# Patient Record
Sex: FEMALE | Employment: OTHER | URBAN - METROPOLITAN AREA
[De-identification: names, ages, dates, MRNs, and addresses within clinical notes are randomized per-mention and may not be internally consistent; named-entity substitution may affect disease eponyms.]

---

## 2022-03-18 PROBLEM — G25.81 RLS (RESTLESS LEGS SYNDROME): Status: ACTIVE | Noted: 2018-02-15

## 2022-03-18 PROBLEM — F41.9 ANXIETY: Status: ACTIVE | Noted: 2022-03-10

## 2022-03-19 PROBLEM — F51.04 PSYCHOPHYSIOLOGICAL INSOMNIA: Status: ACTIVE | Noted: 2022-03-10

## 2022-03-19 PROBLEM — G25.0 ESSENTIAL TREMOR: Status: ACTIVE | Noted: 2022-03-10

## 2022-03-19 PROBLEM — E66.01 MORBID OBESITY (HCC): Status: ACTIVE | Noted: 2022-03-10

## 2022-03-19 PROBLEM — E78.00 ELEVATED LDL CHOLESTEROL LEVEL: Status: ACTIVE | Noted: 2022-03-10

## 2022-03-19 PROBLEM — Z96.652 STATUS POST LEFT KNEE REPLACEMENT: Status: ACTIVE | Noted: 2022-03-10

## 2022-03-20 PROBLEM — F33.42 RECURRENT MAJOR DEPRESSIVE DISORDER, IN FULL REMISSION (HCC): Status: ACTIVE | Noted: 2022-03-10

## 2022-05-19 DIAGNOSIS — F41.9 ANXIETY: ICD-10-CM

## 2022-05-19 DIAGNOSIS — G47.00 INSOMNIA, UNSPECIFIED TYPE: ICD-10-CM

## 2022-05-19 DIAGNOSIS — F32.A DEPRESSION, UNSPECIFIED DEPRESSION TYPE: Primary | ICD-10-CM

## 2022-06-01 ENCOUNTER — TELEPHONE (OUTPATIENT)
Dept: NEUROLOGY | Age: 67
End: 2022-06-01

## 2022-06-05 DIAGNOSIS — F51.04 PSYCHOPHYSIOLOGIC INSOMNIA: ICD-10-CM

## 2022-06-06 RX ORDER — TRAZODONE HYDROCHLORIDE 50 MG/1
TABLET ORAL
Qty: 30 TABLET | Refills: 0 | Status: SHIPPED | OUTPATIENT
Start: 2022-06-06 | End: 2022-06-28 | Stop reason: SDUPTHER

## 2022-06-06 NOTE — TELEPHONE ENCOUNTER
She was referred to [facility] psychiatry at last appointment. She needs to establish with psychiatry.   Please contact the office to expedite this referral

## 2022-06-08 ENCOUNTER — OFFICE VISIT (OUTPATIENT)
Dept: FAMILY MEDICINE CLINIC | Facility: CLINIC | Age: 67
End: 2022-06-08
Payer: COMMERCIAL

## 2022-06-08 VITALS
SYSTOLIC BLOOD PRESSURE: 130 MMHG | BODY MASS INDEX: 40.18 KG/M2 | OXYGEN SATURATION: 98 % | TEMPERATURE: 99 F | WEIGHT: 250 LBS | DIASTOLIC BLOOD PRESSURE: 78 MMHG | HEIGHT: 66 IN | RESPIRATION RATE: 16 BRPM | HEART RATE: 82 BPM

## 2022-06-08 DIAGNOSIS — J01.90 ACUTE RHINOSINUSITIS: Primary | ICD-10-CM

## 2022-06-08 DIAGNOSIS — R50.9 FEVER, UNSPECIFIED FEVER CAUSE: ICD-10-CM

## 2022-06-08 DIAGNOSIS — H10.33 ACUTE CONJUNCTIVITIS OF BOTH EYES, UNSPECIFIED ACUTE CONJUNCTIVITIS TYPE: ICD-10-CM

## 2022-06-08 DIAGNOSIS — R05.9 COUGH: ICD-10-CM

## 2022-06-08 LAB — SARS-COV-2, POC: NORMAL

## 2022-06-08 PROCEDURE — G8427 DOCREV CUR MEDS BY ELIG CLIN: HCPCS | Performed by: FAMILY MEDICINE

## 2022-06-08 PROCEDURE — 3017F COLORECTAL CA SCREEN DOC REV: CPT | Performed by: FAMILY MEDICINE

## 2022-06-08 PROCEDURE — 99213 OFFICE O/P EST LOW 20 MIN: CPT | Performed by: FAMILY MEDICINE

## 2022-06-08 PROCEDURE — G8400 PT W/DXA NO RESULTS DOC: HCPCS | Performed by: FAMILY MEDICINE

## 2022-06-08 PROCEDURE — 1090F PRES/ABSN URINE INCON ASSESS: CPT | Performed by: FAMILY MEDICINE

## 2022-06-08 PROCEDURE — 1036F TOBACCO NON-USER: CPT | Performed by: FAMILY MEDICINE

## 2022-06-08 PROCEDURE — 87426 SARSCOV CORONAVIRUS AG IA: CPT | Performed by: FAMILY MEDICINE

## 2022-06-08 PROCEDURE — 1123F ACP DISCUSS/DSCN MKR DOCD: CPT | Performed by: FAMILY MEDICINE

## 2022-06-08 PROCEDURE — G8417 CALC BMI ABV UP PARAM F/U: HCPCS | Performed by: FAMILY MEDICINE

## 2022-06-08 RX ORDER — POLYMYXIN B SULFATE AND TRIMETHOPRIM 1; 10000 MG/ML; [USP'U]/ML
1 SOLUTION OPHTHALMIC 4 TIMES DAILY
Qty: 20 ML | Refills: 0 | Status: SHIPPED | OUTPATIENT
Start: 2022-06-08 | End: 2022-06-15

## 2022-06-08 RX ORDER — AZITHROMYCIN 500 MG/1
500 TABLET, FILM COATED ORAL DAILY
Qty: 3 TABLET | Refills: 0 | Status: SHIPPED | OUTPATIENT
Start: 2022-06-08 | End: 2022-06-11

## 2022-06-08 ASSESSMENT — ENCOUNTER SYMPTOMS
VOMITING: 0
NAUSEA: 0
ABDOMINAL PAIN: 0
SINUS PAIN: 0
SHORTNESS OF BREATH: 0
EYE PAIN: 1
SORE THROAT: 0
RHINORRHEA: 0
SINUS PRESSURE: 1
DIARRHEA: 0
COUGH: 0
CHEST TIGHTNESS: 0
EYE REDNESS: 1
WHEEZING: 0
EYE DISCHARGE: 1
EYE ITCHING: 1

## 2022-06-08 ASSESSMENT — PATIENT HEALTH QUESTIONNAIRE - PHQ9
1. LITTLE INTEREST OR PLEASURE IN DOING THINGS: 0
2. FEELING DOWN, DEPRESSED OR HOPELESS: 0
SUM OF ALL RESPONSES TO PHQ QUESTIONS 1-9: 0
SUM OF ALL RESPONSES TO PHQ9 QUESTIONS 1 & 2: 0
SUM OF ALL RESPONSES TO PHQ QUESTIONS 1-9: 0

## 2022-06-08 NOTE — PATIENT INSTRUCTIONS
Patient Education        Pinkeye: Care Instructions  Overview     Pinkeye is redness and swelling of the eye surface and the conjunctiva (the lining of the eyelid and the covering of the white part of the eye). Pinkeye is also called conjunctivitis. Pinkeye is often caused by infection with bacteriaor a virus. Dry air, allergies, smoke, and chemicals are other common causes. Pinkeye often gets better on its own in 7 to 10 days. Antibiotics only help if the pinkeye is caused by bacteria. Pinkeye caused by infection spreads easily. If an allergy or chemical is causing pinkeye, it will not go away unless youcan avoid whatever is causing it. Follow-up care is a key part of your treatment and safety. Be sure to make and go to all appointments, and call your doctor if you are having problems. It's also a good idea to know your test results and keep alist of the medicines you take. How can you care for yourself at home?  Wash your hands often. Always wash them before and after you treat pinkeye or touch your eyes or face.  Use moist cotton or a clean, wet cloth to remove crust. Wipe from the inside corner of the eye to the outside. Use a clean part of the cloth for each wipe.  Put cold or warm wet cloths on your eye a few times a day if the eye hurts.  Do not wear contact lenses or eye makeup until the pinkeye is gone. Throw away any eye makeup you were using when you got pinkeye. Clean your contacts and storage case. If you wear disposable contacts, use a new pair when your eye has cleared and it is safe to wear contacts again.  If the doctor gave you antibiotic ointment or eyedrops, use them as directed. Use the medicine for as long as instructed, even if your eye starts looking better soon. Keep the bottle tip clean, and do not let it touch the eye area.  To put in eyedrops or ointment:  ? Tilt your head back, and pull your lower eyelid down with one finger. ?  Drop or squirt the medicine inside the lower lid.  ? Close your eye for 30 to 60 seconds to let the drops or ointment move around. ? Do not touch the ointment or dropper tip to your eyelashes or any other surface.  Do not share towels, pillows, or washcloths while you have pinkeye. When should you call for help? Call your doctor now or seek immediate medical care if:     You have pain in your eye, not just irritation on the surface.      You have a change in vision or loss of vision.      You have an increase in discharge from the eye.      Your eye has not started to improve or begins to get worse within 48 hours after you start using antibiotics.      Pinkeye lasts longer than 7 days. Watch closely for changes in your health, and be sure to contact your doctor ifyou have any problems. Where can you learn more? Go to https://[email or web address].[email or web address].NoPaperForms.com. org and sign in to your [employer] account. Enter Y392 in the miiCard box to learn more about \"Pinkeye: Care Instructions. \"     If you do not have an account, please click on the \"Sign Up Now\" link. Current as of: July 1, 2021               Content Version: 13.2  © 9513-5981 [employer]wise, Incorporated. Care instructions adapted under license by [facility] ([facility]). If you have questions about a medical condition or this instruction, always ask your healthcare professional. [address] any warranty or liability for your use of this information.

## 2022-06-08 NOTE — PROGRESS NOTES
[address] [facility],[address], DO  [address], [address] [facility] [address]   No:  [phone]  Fax:  [phone]        Assessment/Plan:   Soila Garcia was seen today for sinus problem. Diagnoses and all orders for this visit:    Acute rhinosinusitis  With symptoms improving advised patient hold off on starting azithromycin for a few days. But if symptoms worsen or do not improve by day 7-10 and she is advised to start azithromycin. She has penicillin allergy. -     azithromycin (ZITHROMAX) 500 MG tablet; Take 1 tablet by mouth daily for 3 days    Acute conjunctivitis of both eyes, unspecified acute conjunctivitis type  Prescribing Polytrim eyedrops. -     trimethoprim-polymyxin b (POLYTRIM) 41471-1.1 UNIT/ML-% ophthalmic solution; Place 1 drop into both eyes 4 times daily for 7 days    Cough  COVID-19 negative. Advised patient of results  -     AMB POC SARS-COV-2    Fever, unspecified fever cause  -     AMB POC SARS-COV-2        Labs:  Results for orders placed or performed in visit on 06/08/22   AMB POC SARS-COV-2   Result Value Ref Range    SARS-COV-2, POC Not-Detected Not Detected             Herminia Yan is a 77 y.o. female who is seen for evaluation of   Chief Complaint   Patient presents with    Sinus Problem     siuses are full, feels heavy, gives her a headache. ears are blocked. Sputum is brownish in color, postnasal drip, cough. Also thinks she may have pink eye as well. States that her symptoms started last Thursday. HPI:   Here today for acute URI symptoms that started 5 days ago. Some better. Started off with sore throat this or has resolved. Had ear pain or feeling of ears being plugged but this is better. No nausea, vomiting or diarrhea. She is had a wet cough. She is having brown nasal discharge and congestion. She has had decrease in smell and taste. She has had night sweats but no fever no chills.   She initially was short of breath but this has resolved. She has had redness in her eyes right and now left today. This is been going on for few days in her right eye. She states she has been having white mucousy discharge from her eyes with itch and discomfort in her right eye. She has heard from neurology office in regards to tremor. She has not heard from psychiatry office yet. Review of Systems:  Review of Systems   Constitutional: Positive for diaphoresis. Negative for chills and fatigue. HENT: Positive for congestion and sinus pressure. Negative for rhinorrhea, sinus pain and sore throat. Eyes: Positive for pain, discharge, redness and itching. Respiratory: Negative for cough, chest tightness, shortness of breath and wheezing. Cardiovascular: Negative for chest pain. Gastrointestinal: Negative for abdominal pain, diarrhea, nausea and vomiting. Musculoskeletal: Negative for myalgias. Neurological: Negative for headaches. History:  Past Medical History:   Diagnosis Date    Anxiety     Depression     Elevated LDL cholesterol level     Essential tremor     Melanoma in situ (HCC)     Osteoporosis     Prediabetes     RLS (restless legs syndrome)        Past Surgical History:   Procedure Laterality Date     SECTION      GASTRIC BYPASS SURGERY      MALIGNANT SKIN LESION EXCISION      TONSILLECTOMY      TOTAL KNEE ARTHROPLASTY Left        Current Outpatient Medications   Medication Sig Dispense Refill    trimethoprim-polymyxin b (POLYTRIM) 17305-4.1 UNIT/ML-% ophthalmic solution Place 1 drop into both eyes 4 times daily for 7 days 20 mL 0    azithromycin (ZITHROMAX) 500 MG tablet Take 1 tablet by mouth daily for 3 days 3 tablet 0    traZODone (DESYREL) 50 MG tablet TAKE 1 TABLET BY MOUTH NIGHTLY.  INDICATIONS: INSOMNIA ASSOCIATED WITH DEPRESSION 30 tablet 0    MAGNESIUM PO Take by mouth      MELATONIN PO Take by mouth      ZINC PO Take by mouth      escitalopram (LEXAPRO) 20 MG tablet Take 20 mg by mouth Cervical: No cervical adenopathy. Skin:     General: Skin is warm. Findings: No rash. Neurological:      Mental Status: She is alert. Psychiatric:         Mood and Affect: Mood normal.         Behavior: Behavior normal.             Leilani Parada DO    This note was generated using Dragon voice recognition software.   There may be medical errors due to computer generated translation

## 2022-06-28 ENCOUNTER — OFFICE VISIT (OUTPATIENT)
Dept: FAMILY MEDICINE CLINIC | Facility: CLINIC | Age: 67
End: 2022-06-28
Payer: MEDICARE

## 2022-06-28 VITALS
OXYGEN SATURATION: 97 % | RESPIRATION RATE: 18 BRPM | HEIGHT: 66 IN | WEIGHT: 246.8 LBS | SYSTOLIC BLOOD PRESSURE: 130 MMHG | HEART RATE: 80 BPM | BODY MASS INDEX: 39.66 KG/M2 | DIASTOLIC BLOOD PRESSURE: 72 MMHG

## 2022-06-28 DIAGNOSIS — F51.04 PSYCHOPHYSIOLOGIC INSOMNIA: ICD-10-CM

## 2022-06-28 DIAGNOSIS — F41.9 ANXIETY: ICD-10-CM

## 2022-06-28 DIAGNOSIS — E78.00 ELEVATED LDL CHOLESTEROL LEVEL: ICD-10-CM

## 2022-06-28 DIAGNOSIS — R73.03 PREDIABETES: Primary | ICD-10-CM

## 2022-06-28 DIAGNOSIS — F33.1 MODERATE EPISODE OF RECURRENT MAJOR DEPRESSIVE DISORDER (HCC): ICD-10-CM

## 2022-06-28 DIAGNOSIS — Z00.00 INITIAL MEDICARE ANNUAL WELLNESS VISIT: ICD-10-CM

## 2022-06-28 DIAGNOSIS — R42 POSTURAL DIZZINESS: ICD-10-CM

## 2022-06-28 DIAGNOSIS — F51.04 PSYCHOPHYSIOLOGICAL INSOMNIA: ICD-10-CM

## 2022-06-28 DIAGNOSIS — J45.909 UNCOMPLICATED ASTHMA, UNSPECIFIED ASTHMA SEVERITY, UNSPECIFIED WHETHER PERSISTENT: ICD-10-CM

## 2022-06-28 DIAGNOSIS — E66.01 MORBID OBESITY (HCC): ICD-10-CM

## 2022-06-28 DIAGNOSIS — G25.81 RLS (RESTLESS LEGS SYNDROME): ICD-10-CM

## 2022-06-28 DIAGNOSIS — Z91.81 AT HIGH RISK FOR FALLS: ICD-10-CM

## 2022-06-28 DIAGNOSIS — Z11.59 NEED FOR HEPATITIS C SCREENING TEST: ICD-10-CM

## 2022-06-28 LAB
ALBUMIN SERPL-MCNC: 3.5 G/DL (ref 3.2–4.6)
ALBUMIN/GLOB SERPL: 1.3 {RATIO} (ref 1.2–3.5)
ALP SERPL-CCNC: 94 U/L (ref 50–136)
ALT SERPL-CCNC: 20 U/L (ref 12–65)
ANION GAP SERPL CALC-SCNC: 5 MMOL/L (ref 7–16)
AST SERPL-CCNC: 9 U/L (ref 15–37)
BASOPHILS # BLD: 0 K/UL (ref 0–0.2)
BASOPHILS NFR BLD: 1 % (ref 0–2)
BILIRUB SERPL-MCNC: 0.3 MG/DL (ref 0.2–1.1)
BUN SERPL-MCNC: 20 MG/DL (ref 8–23)
CALCIUM SERPL-MCNC: 9.3 MG/DL (ref 8.3–10.4)
CHLORIDE SERPL-SCNC: 109 MMOL/L (ref 98–107)
CHOLEST SERPL-MCNC: 224 MG/DL
CO2 SERPL-SCNC: 28 MMOL/L (ref 21–32)
CREAT SERPL-MCNC: 0.8 MG/DL (ref 0.6–1)
DIFFERENTIAL METHOD BLD: ABNORMAL
EOSINOPHIL # BLD: 0.2 K/UL (ref 0–0.8)
EOSINOPHIL NFR BLD: 4 % (ref 0.5–7.8)
ERYTHROCYTE [DISTWIDTH] IN BLOOD BY AUTOMATED COUNT: 15 % (ref 11.9–14.6)
GLOBULIN SER CALC-MCNC: 2.8 G/DL (ref 2.3–3.5)
GLUCOSE SERPL-MCNC: 106 MG/DL (ref 65–100)
HCT VFR BLD AUTO: 44.4 % (ref 35.8–46.3)
HDLC SERPL-MCNC: 68 MG/DL (ref 40–60)
HDLC SERPL: 3.3 {RATIO}
HGB BLD-MCNC: 13.5 G/DL (ref 11.7–15.4)
IMM GRANULOCYTES # BLD AUTO: 0 K/UL (ref 0–0.5)
IMM GRANULOCYTES NFR BLD AUTO: 1 % (ref 0–5)
LDLC SERPL CALC-MCNC: 138 MG/DL
LYMPHOCYTES # BLD: 1.3 K/UL (ref 0.5–4.6)
LYMPHOCYTES NFR BLD: 30 % (ref 13–44)
MCH RBC QN AUTO: 30.3 PG (ref 26.1–32.9)
MCHC RBC AUTO-ENTMCNC: 30.4 G/DL (ref 31.4–35)
MCV RBC AUTO: 99.8 FL (ref 79.6–97.8)
MONOCYTES # BLD: 0.4 K/UL (ref 0.1–1.3)
MONOCYTES NFR BLD: 9 % (ref 4–12)
NEUTS SEG # BLD: 2.5 K/UL (ref 1.7–8.2)
NEUTS SEG NFR BLD: 56 % (ref 43–78)
NRBC # BLD: 0 K/UL (ref 0–0.2)
PLATELET # BLD AUTO: 299 K/UL (ref 150–450)
PMV BLD AUTO: 10.5 FL (ref 9.4–12.3)
POTASSIUM SERPL-SCNC: 4.5 MMOL/L (ref 3.5–5.1)
PROT SERPL-MCNC: 6.3 G/DL (ref 6.3–8.2)
RBC # BLD AUTO: 4.45 M/UL (ref 4.05–5.2)
SODIUM SERPL-SCNC: 142 MMOL/L (ref 136–145)
TRIGL SERPL-MCNC: 90 MG/DL (ref 35–150)
VLDLC SERPL CALC-MCNC: 18 MG/DL (ref 6–23)
WBC # BLD AUTO: 4.4 K/UL (ref 4.3–11.1)

## 2022-06-28 PROCEDURE — 1123F ACP DISCUSS/DSCN MKR DOCD: CPT | Performed by: FAMILY MEDICINE

## 2022-06-28 PROCEDURE — G0402 INITIAL PREVENTIVE EXAM: HCPCS | Performed by: FAMILY MEDICINE

## 2022-06-28 PROCEDURE — G8417 CALC BMI ABV UP PARAM F/U: HCPCS | Performed by: FAMILY MEDICINE

## 2022-06-28 PROCEDURE — G8400 PT W/DXA NO RESULTS DOC: HCPCS | Performed by: FAMILY MEDICINE

## 2022-06-28 PROCEDURE — 1036F TOBACCO NON-USER: CPT | Performed by: FAMILY MEDICINE

## 2022-06-28 PROCEDURE — 99214 OFFICE O/P EST MOD 30 MIN: CPT | Performed by: FAMILY MEDICINE

## 2022-06-28 PROCEDURE — 1090F PRES/ABSN URINE INCON ASSESS: CPT | Performed by: FAMILY MEDICINE

## 2022-06-28 PROCEDURE — 3017F COLORECTAL CA SCREEN DOC REV: CPT | Performed by: FAMILY MEDICINE

## 2022-06-28 PROCEDURE — G8427 DOCREV CUR MEDS BY ELIG CLIN: HCPCS | Performed by: FAMILY MEDICINE

## 2022-06-28 RX ORDER — LAMOTRIGINE 100 MG/1
100 TABLET ORAL DAILY
Qty: 30 TABLET | Refills: 2 | Status: SHIPPED | OUTPATIENT
Start: 2022-06-28

## 2022-06-28 RX ORDER — ESCITALOPRAM OXALATE 20 MG/1
20 TABLET ORAL DAILY
Qty: 30 TABLET | Refills: 2 | Status: SHIPPED | OUTPATIENT
Start: 2022-06-28

## 2022-06-28 RX ORDER — LAMOTRIGINE 25 MG/1
25 TABLET ORAL DAILY
Qty: 30 TABLET | Refills: 2 | Status: SHIPPED | OUTPATIENT
Start: 2022-06-28

## 2022-06-28 RX ORDER — LANOLIN ALCOHOL/MO/W.PET/CERES
3000 CREAM (GRAM) TOPICAL DAILY
COMMUNITY

## 2022-06-28 RX ORDER — TRAZODONE HYDROCHLORIDE 50 MG/1
TABLET ORAL
Qty: 30 TABLET | Refills: 2 | Status: SHIPPED | OUTPATIENT
Start: 2022-06-28

## 2022-06-28 RX ORDER — FLUTICASONE PROPIONATE 100 MCG
BLISTER, WITH INHALATION DEVICE INHALATION
Qty: 60 EACH | Refills: 3 | Status: SHIPPED | OUTPATIENT
Start: 2022-06-28

## 2022-06-28 RX ORDER — ALBUTEROL SULFATE 90 UG/1
2 AEROSOL, METERED RESPIRATORY (INHALATION) 4 TIMES DAILY PRN
Qty: 18 G | Refills: 5 | Status: SHIPPED | OUTPATIENT
Start: 2022-06-28

## 2022-06-28 RX ORDER — CHOLECALCIFEROL (VITAMIN D3) 1250 MCG
CAPSULE ORAL
COMMUNITY

## 2022-06-28 RX ORDER — ESCITALOPRAM OXALATE 20 MG/1
TABLET ORAL
Qty: 90 TABLET | OUTPATIENT
Start: 2022-06-28

## 2022-06-28 RX ORDER — ASCORBIC ACID 500 MG
500 TABLET ORAL DAILY
COMMUNITY

## 2022-06-28 ASSESSMENT — ENCOUNTER SYMPTOMS
SHORTNESS OF BREATH: 0
BLOOD IN STOOL: 0
VOMITING: 0
ABDOMINAL PAIN: 0
COUGH: 0
NAUSEA: 0
BACK PAIN: 1

## 2022-06-28 ASSESSMENT — PATIENT HEALTH QUESTIONNAIRE - PHQ9
5. POOR APPETITE OR OVEREATING: 2
SUM OF ALL RESPONSES TO PHQ9 QUESTIONS 1 & 2: 6
SUM OF ALL RESPONSES TO PHQ QUESTIONS 1-9: 15
9. THOUGHTS THAT YOU WOULD BE BETTER OFF DEAD, OR OF HURTING YOURSELF: 0
10. IF YOU CHECKED OFF ANY PROBLEMS, HOW DIFFICULT HAVE THESE PROBLEMS MADE IT FOR YOU TO DO YOUR WORK, TAKE CARE OF THINGS AT HOME, OR GET ALONG WITH OTHER PEOPLE: 2
SUM OF ALL RESPONSES TO PHQ QUESTIONS 1-9: 15
SUM OF ALL RESPONSES TO PHQ QUESTIONS 1-9: 15
1. LITTLE INTEREST OR PLEASURE IN DOING THINGS: 3
2. FEELING DOWN, DEPRESSED OR HOPELESS: 3
4. FEELING TIRED OR HAVING LITTLE ENERGY: 3
8. MOVING OR SPEAKING SO SLOWLY THAT OTHER PEOPLE COULD HAVE NOTICED. OR THE OPPOSITE, BEING SO FIGETY OR RESTLESS THAT YOU HAVE BEEN MOVING AROUND A LOT MORE THAN USUAL: 0
SUM OF ALL RESPONSES TO PHQ QUESTIONS 1-9: 15
3. TROUBLE FALLING OR STAYING ASLEEP: 3
6. FEELING BAD ABOUT YOURSELF - OR THAT YOU ARE A FAILURE OR HAVE LET YOURSELF OR YOUR FAMILY DOWN: 1
7. TROUBLE CONCENTRATING ON THINGS, SUCH AS READING THE NEWSPAPER OR WATCHING TELEVISION: 0

## 2022-06-28 ASSESSMENT — ANXIETY QUESTIONNAIRES
6. BECOMING EASILY ANNOYED OR IRRITABLE: 0
4. TROUBLE RELAXING: 0
5. BEING SO RESTLESS THAT IT IS HARD TO SIT STILL: 0
7. FEELING AFRAID AS IF SOMETHING AWFUL MIGHT HAPPEN: 0
2. NOT BEING ABLE TO STOP OR CONTROL WORRYING: 0
1. FEELING NERVOUS, ANXIOUS, OR ON EDGE: 0
GAD7 TOTAL SCORE: 0
3. WORRYING TOO MUCH ABOUT DIFFERENT THINGS: 0

## 2022-06-28 NOTE — PROGRESS NOTES
Medicare Annual Wellness Visit    Rosetta To is here for Medicare AWV    Assessment & Plan   Prediabetes  -     CBC with Auto Differential; Future  -     Comprehensive Metabolic Panel; Future  -     Hemoglobin A1C; Future  -     Lipid Panel; Future  Moderate episode of recurrent major depressive disorder ([facility] [address]  -     External Referral to Psychiatry  -     lamoTRIgine (LAMICTAL) 100 MG tablet; Take 1 tablet by mouth daily, Disp-30 tablet, R-2Normal  -     escitalopram (LEXAPRO) 20 MG tablet; Take 1 tablet by mouth daily, Disp-30 tablet, R-2Normal  -     lamoTRIgine (LAMICTAL) 25 MG tablet; Take 1 tablet by mouth daily, Disp-30 tablet, R-2Normal  -     traZODone (DESYREL) 50 MG tablet; TAKE 1 TABLET BY MOUTH NIGHTLY. INDICATIONS: INSOMNIA ASSOCIATED WITH DEPRESSION, Disp-30 tablet, R-2Normal  Anxiety  -     External Referral to Psychiatry  -     lamoTRIgine (LAMICTAL) 100 MG tablet; Take 1 tablet by mouth daily, Disp-30 tablet, R-2Normal  -     escitalopram (LEXAPRO) 20 MG tablet; Take 1 tablet by mouth daily, Disp-30 tablet, R-2Normal  -     lamoTRIgine (LAMICTAL) 25 MG tablet; Take 1 tablet by mouth daily, Disp-30 tablet, R-2Normal  Psychophysiological insomnia  -     External Referral to Psychiatry  -     [facility]  -     traZODone (DESYREL) 50 MG tablet; TAKE 1 TABLET BY MOUTH NIGHTLY. INDICATIONS: INSOMNIA ASSOCIATED WITH DEPRESSION, Disp-30 tablet, R-2Normal  RLS (restless legs syndrome)  Uncomplicated asthma, unspecified asthma severity, unspecified whether persistent  -     albuterol sulfate HFA (VENTOLIN HFA) 108 (90 Base) MCG/ACT inhaler; Inhale 2 puffs into the lungs 4 times daily as needed for Wheezing or Shortness of Breath, Disp-18 g, R-5Normal  -     fluticasone propionate (FLOVENT DISKUS) 100 MCG/BLIST AEPB inhaler; 1 puff twice daily.   Brush teeth after each use, Disp-60 each, R-3Normal  Elevated LDL cholesterol level  -     CBC with Auto Differential; Future  - Comprehensive Metabolic Panel; Future  -     Hemoglobin A1C; Future  -     Lipid Panel; Future  Morbid obesity ([facility] [address]  -     [address] [facility]  At high risk for falls  -     External Referral to Physical Therapy  Postural dizziness  -     [address]  Psychophysiologic insomnia  -     traZODone (DESYREL) 50 MG tablet; TAKE 1 TABLET BY MOUTH NIGHTLY. INDICATIONS: INSOMNIA ASSOCIATED WITH DEPRESSION, Disp-30 tablet, R-2Normal  Initial Medicare annual wellness visit  Need for hepatitis C screening test  -     Hepatitis C Antibody; Future      Recommendations for Preventive Services Due: see orders and patient instructions/AVS.  Recommended screening schedule for the next 5-10 years is provided to the patient in written form: see Patient Instructions/AVS.     Return in 6 months (on 12/28/2022) for PreDM-labs prior . Subjective       Patient's complete Health Risk Assessment and screening values have been reviewed and are found in Flowsheets. The following problems were reviewed today and where indicated follow up appointments were made and/or referrals ordered.     Positive Risk Factor Screenings with Interventions:    Fall Risk:  Do you feel unsteady or are you worried about falling? : (!) yes  2 or more falls in past year?: (!) yes  Fall with injury in past year?: no     Fall Risk Interventions:    · Physical therapy referral ordered for strength and balance training     Depression:  PHQ-2 Score: 6  PHQ-9 Total Score: 15    Severity:1-4 = minimal depression, 5-9 = mild depression, 10-14 = moderate depression, 15-19 = moderately severe depression, 20-27 = severe depression    Depression Interventions:  · Psychiatry referral ordered for further evaluation/treatment            General Health and ACP:  General  In general, how would you say your health is?: Good  In the past 7 days, have you experienced any of the following: New or Increased Pain, New or Increased Fatigue, Loneliness, Social Isolation, Stress or Anger?: No  Do you get the social and emotional support that you need?: Yes  Do you have a Living Will?: Yes    Advance Directives     Power of Azeem Gonzalez Will ACP-Advance Directive ACP-Power of     Not on File Not on File Not on File Not on File      General Health Risk Interventions:  · No Living Will: Advance Care Planning addressed with patient today    Health Habits/Nutrition:     Physical Activity: Insufficiently Active    Days of Exercise per Week: 1 day    Minutes of Exercise per Session: 30 min     Have you lost any weight without trying in the past 3 months?: No  Body mass index: (!) 39.83  Have you seen the dentist within the past year?: Yes    Health Habits/Nutrition Interventions:  · Inadequate physical activity:  Referral to PT placed             Objective   Vitals:    06/28/22 0954   BP: 130/72   Site: Left Lower Arm   Position: Sitting   Pulse: 80   Resp: 18   SpO2: 97%   Weight: 246 lb 12.8 oz (111.9 kg)   Height: 5' 6\" (1.676 m)      Body mass index is 39.83 kg/m². Allergies   Allergen Reactions    Bupropion Other (See Comments)     headache    Penicillins Rash     Prior to Visit Medications    Medication Sig Taking?  Authorizing Provider   vitamin B-12 (CYANOCOBALAMIN) 1000 MCG tablet Take 3,000 mcg by mouth daily  Yes Historical Provider, MD   Cholecalciferol (VITAMIN D3) 1.25 MG (05199 UT) CAPS Take by mouth Yes Historical Provider, MD   vitamin C (ASCORBIC ACID) 500 MG tablet Take 500 mg by mouth daily Yes Historical Provider, MD   albuterol sulfate HFA (VENTOLIN HFA) 108 (90 Base) MCG/ACT inhaler Inhale 2 puffs into the lungs 4 times daily as needed for Wheezing or Shortness of Breath Yes Kimberly Bassett, DO   lamoTRIgine (LAMICTAL) 100 MG tablet Take 1 tablet by mouth daily Yes Kimberly Bassett DO   escitalopram (LEXAPRO) 20 MG tablet Take 1 tablet by mouth daily Yes Kimberly Bassett, DO   lamoTRIgine (LAMICTAL) 25 MG tablet Take 1 tablet by mouth daily Yes Monika Tsai DO   traZODone (DESYREL) 50 MG tablet TAKE 1 TABLET BY MOUTH NIGHTLY. INDICATIONS: INSOMNIA ASSOCIATED WITH DEPRESSION Yes Monika Tsai DO   fluticasone propionate (FLOVENT DISKUS) 100 MCG/BLIST AEPB inhaler 1 puff twice daily.   Brush teeth after each use Yes Monika Tsai DO   MAGNESIUM PO Take 200 mg by mouth  Yes Ar Automatic Reconciliation   MELATONIN PO Take 30 mg by mouth  Yes Ar Automatic Reconciliation   ZINC PO Take by mouth Yes Ar Automatic Reconciliation   primidone (MYSOLINE) 50 MG tablet Take 3 tablets by mouth 2 times daily Yes Ar Automatic Reconciliation   topiramate (TOPAMAX) 25 MG tablet Take 25 mg by mouth daily Yes Ar Automatic Reconciliation   venlafaxine (EFFEXOR XR) 75 MG extended release capsule Take 225 mg by mouth daily Yes Ar Automatic Reconciliation       CareTeam (Including outside providers/suppliers regularly involved in providing care):   Patient Care Team:  Monika Tsai DO as PCP - . Elena Williamson 26, DO as PCP - [facility] Empaneled Provider  Inocencio Robertson MD (Neurology)  Peg Holly MD (Dermatology)     Reviewed and updated this visit:  Tobacco  Allergies  Meds  Problems  Med Hx  Surg Hx  Soc Hx  Fam Hx

## 2022-06-28 NOTE — PROGRESS NOTES
[address] [facility],[address], DO  [address], [address] [facility] [address]   No:  [phone]  Fax:  [phone]        Assessment/Plan:   Vitaly Bain was seen today for medicare awv. Diagnoses and all orders for this visit:    Prediabetes  Await today's labs. -     CBC with Auto Differential; Future  -     Comprehensive Metabolic Panel; Future  -     Hemoglobin A1C; Future  -     Lipid Panel; Future  -     Lipid Panel  -     Hemoglobin A1C  -     Comprehensive Metabolic Panel  -     CBC with Auto Differential    Moderate episode of recurrent major depressive disorder [facility])  Placing referral to another psychiatry office so she can establish care. Patient has an unusual regimen with 2 SSRIs and Lamictal for anxiety and depression. Appreciate psychiatry input especially as her symptoms are not currently well controlled. I have continued her past regimen until she is established with a new psychiatrist  -     External Referral to Psychiatry  -     lamoTRIgine (LAMICTAL) 100 MG tablet; Take 1 tablet by mouth daily  -     escitalopram (LEXAPRO) 20 MG tablet; Take 1 tablet by mouth daily  -     lamoTRIgine (LAMICTAL) 25 MG tablet; Take 1 tablet by mouth daily  -     traZODone (DESYREL) 50 MG tablet; TAKE 1 TABLET BY MOUTH NIGHTLY. INDICATIONS: INSOMNIA ASSOCIATED WITH DEPRESSION    Anxiety  See above  -     External Referral to Psychiatry  -     lamoTRIgine (LAMICTAL) 100 MG tablet; Take 1 tablet by mouth daily  -     escitalopram (LEXAPRO) 20 MG tablet; Take 1 tablet by mouth daily  -     lamoTRIgine (LAMICTAL) 25 MG tablet; Take 1 tablet by mouth daily    Psychophysiological insomnia  She is having difficulty with insomnia. Advised patient that Tylenol PM (Benadryl) can lead to dizziness throughout the day. She is also taking excessive amount of this which could be contributing to some of how she is feeling throughout the day.   Advised patient to stop the Benadryl and await sleep medicine psychiatry recommendations  -     External Referral to Psychiatry  -     [address] Sleep Medicine, [facility]  -     traZODone (DESYREL) 50 MG tablet; TAKE 1 TABLET BY MOUTH NIGHTLY. INDICATIONS: INSOMNIA ASSOCIATED WITH DEPRESSION    RLS (restless legs syndrome)  She has established with neurology. Uncomplicated asthma, unspecified asthma severity, unspecified whether persistent  Continue Flovent and albuterol  -     albuterol sulfate HFA (VENTOLIN HFA) 108 (90 Base) MCG/ACT inhaler; Inhale 2 puffs into the lungs 4 times daily as needed for Wheezing or Shortness of Breath  -     fluticasone propionate (FLOVENT DISKUS) 100 MCG/BLIST AEPB inhaler; 1 puff twice daily. Brush teeth after each use    Elevated LDL cholesterol level  Await today's labs. -     CBC with Auto Differential; Future  -     Comprehensive Metabolic Panel; Future  -     Hemoglobin A1C; Future  -     Lipid Panel; Future  -     Lipid Panel  -     Hemoglobin A1C  -     Comprehensive Metabolic Panel  -     CBC with Auto Differential    Morbid obesity ([address]  Await today's labs. She is at risk for sleep apnea. But she is also having difficulty with insomnia. Await sleep medicines and recommendations  -     [address]    At high risk for falls  She is feeling unsteady with postural changes. This may be related to her medications including Benadryl. Also referring her to physical therapy to help with some of her discomfort with standing in her balance  -     External Referral to Physical Therapy    Postural dizziness  From patient to cardiology. She is concerned for POTS and would like to have this evaluated. Discussed with patient this could also be related to her medication she is taking.   Await labs to rule out anemia, electrolyte imbalance that could be contributing to her dizziness.  -     [address]    Psychophysiologic insomnia  Await sleep medicine recommendations and psychiatry recommendations  -     traZODone (DESYREL) 50 MG tablet; TAKE 1 TABLET BY MOUTH NIGHTLY. INDICATIONS: INSOMNIA ASSOCIATED WITH DEPRESSION    Initial Medicare annual wellness visit    Need for hepatitis C screening test  -     Hepatitis C Antibody; Future        Labs:  No results found for this visit on 06/28/22. Ronnell Schroeder is a 77 y.o. female who is seen for evaluation of   Chief Complaint   Patient presents with    Medicare AWV       HPI:   She is here today for annual wellness visit and to follow-up chronic issues. She is currently awaiting referral callback for psychiatry. She moved to the area and needs to establish with a specialist.  She is also struggling with some depression and anxiety symptoms. She is also concern for POTS. She states her daughter is being worked up for this. Her daughter is in [place], [place]. She states she has very similar symptoms. She states she is dizzy with standing. She feels much more comfortable when she is more horizontal.  She attributes some of that to her depression. But she feels that she is going to fall quite a bit. She is not having palpitations however. She does report poor sleep. She is using trazodone which is prescribed by her former psychiatrist, she is also using up to 6 tablets of Tylenol PM at night to help with sleep. She states she knows that she should not be using this or doing this. She has never had a sleep study. Review of Systems:  Review of Systems   Constitutional: Negative for chills, fever and unexpected weight change. Respiratory: Negative for cough and shortness of breath. Cardiovascular: Negative for chest pain, palpitations and leg swelling. Gastrointestinal: Negative for abdominal pain, blood in stool, nausea and vomiting. Musculoskeletal: Positive for back pain. Neurological: Positive for dizziness. Psychiatric/Behavioral: Positive for sleep disturbance.  The patient is nervous/anxious. History:  Past Medical History:   Diagnosis Date    Anxiety     Asthma     Depression     Elevated LDL cholesterol level     Essential tremor     Melanoma in situ (HCC)     Osteoporosis     Prediabetes     RLS (restless legs syndrome)        Past Surgical History:   Procedure Laterality Date     SECTION      GASTRIC BYPASS SURGERY      MALIGNANT SKIN LESION EXCISION      TONSILLECTOMY      TOTAL KNEE ARTHROPLASTY Left        Current Outpatient Medications   Medication Sig Dispense Refill    vitamin B-12 (CYANOCOBALAMIN) 1000 MCG tablet Take 3,000 mcg by mouth daily       Cholecalciferol (VITAMIN D3) 1.25 MG (79402 UT) CAPS Take by mouth      vitamin C (ASCORBIC ACID) 500 MG tablet Take 500 mg by mouth daily      albuterol sulfate HFA (VENTOLIN HFA) 108 (90 Base) MCG/ACT inhaler Inhale 2 puffs into the lungs 4 times daily as needed for Wheezing or Shortness of Breath 18 g 5    lamoTRIgine (LAMICTAL) 100 MG tablet Take 1 tablet by mouth daily 30 tablet 2    escitalopram (LEXAPRO) 20 MG tablet Take 1 tablet by mouth daily 30 tablet 2    lamoTRIgine (LAMICTAL) 25 MG tablet Take 1 tablet by mouth daily 30 tablet 2    traZODone (DESYREL) 50 MG tablet TAKE 1 TABLET BY MOUTH NIGHTLY. INDICATIONS: INSOMNIA ASSOCIATED WITH DEPRESSION 30 tablet 2    fluticasone propionate (FLOVENT DISKUS) 100 MCG/BLIST AEPB inhaler 1 puff twice daily. Brush teeth after each use 60 each 3    MAGNESIUM PO Take 200 mg by mouth       MELATONIN PO Take 30 mg by mouth       ZINC PO Take by mouth      primidone (MYSOLINE) 50 MG tablet Take 3 tablets by mouth 2 times daily      topiramate (TOPAMAX) 25 MG tablet Take 25 mg by mouth daily      venlafaxine (EFFEXOR XR) 75 MG extended release capsule Take 225 mg by mouth daily       No current facility-administered medications for this visit. There is no immunization history on file for this patient.     PHQ-9  Little interest or pleasure in doing things: Nearly every day  Feeling down, depressed, or hopeless: Nearly every day  Trouble falling or staying asleep, or sleeping too much: Nearly every day  Feeling tired or having little energy: Nearly every day  Poor appetite or overeating: More than half the days (poor appetite)  Feeling bad about yourself - or that you are a failure or have let yourself or your family down: Several days  Trouble concentrating on things, such as reading the newspaper or watching television: Not at all  Moving or speaking so slowly that other people could have noticed. Or the opposite - being so fidgety or restless that you have been moving around a lot more than usual: Not at all  Thoughts that you would be better off dead, or of hurting yourself in some way: Not at all  PHQ-9 Total Score: 15  If you checked off any problems, how difficult have these problems made it for you to do your work, take care of things at home, or get along with other people?: Very difficult     Vitals:    Vitals:    06/28/22 0954   BP: 130/72   Site: Left Lower Arm   Position: Sitting   Pulse: 80   Resp: 18   SpO2: 97%   Weight: 246 lb 12.8 oz (111.9 kg)   Height: 5' 6\" (1.676 m)          Physical Exam:  Physical Exam  Vitals reviewed. Constitutional:       Appearance: Normal appearance. HENT:      Head: Normocephalic and atraumatic. Cardiovascular:      Rate and Rhythm: Normal rate and regular rhythm. Heart sounds: No murmur heard. Pulmonary:      Effort: Pulmonary effort is normal. No respiratory distress. Breath sounds: Normal breath sounds. No wheezing or rhonchi. Abdominal:      General: There is no distension. Palpations: There is no mass. Tenderness: There is no abdominal tenderness. There is no right CVA tenderness, left CVA tenderness, guarding or rebound. Hernia: No hernia is present. Musculoskeletal:      Cervical back: Neck supple. Right lower leg: No edema.       Left lower leg: No edema. Lymphadenopathy:      Cervical: No cervical adenopathy. Skin:     General: Skin is warm and dry. Neurological:      Mental Status: She is alert. Psychiatric:         Mood and Affect: Mood normal.         Behavior: Behavior normal.             Elysa Prader, DO    This note was generated using Dragon voice recognition software.   There may be medical errors due to computer generated translation

## 2022-06-28 NOTE — PATIENT INSTRUCTIONS
Personalized Preventive Plan for Jesus Jackson - 6/28/2022  Medicare offers a range of preventive health benefits. Some of the tests and screenings are paid in full while other may be subject to a deductible, co-insurance, and/or copay. Some of these benefits include a comprehensive review of your medical history including lifestyle, illnesses that may run in your family, and various assessments and screenings as appropriate. After reviewing your medical record and screening and assessments performed today your provider may have ordered immunizations, labs, imaging, and/or referrals for you. A list of these orders (if applicable) as well as your Preventive Care list are included within your After Visit Summary for your review. Other Preventive Recommendations:    · A preventive eye exam performed by an eye specialist is recommended every 1-2 years to screen for glaucoma; cataracts, macular degeneration, and other eye disorders. · A preventive dental visit is recommended every 6 months. · Try to get at least 150 minutes of exercise per week or 10,000 steps per day on a pedometer . · Order or download the FREE \"Exercise & Physical Activity: Your Everyday Guide\" from [employer]. Call 5-416.384.7541 or search [employer] online. · You need 3938-0433 mg of calcium and 1766-0537 IU of vitamin D per day. It is possible to meet your calcium requirement with diet alone, but a vitamin D supplement is usually necessary to meet this goal.  · When exposed to the sun, use a sunscreen that protects against both UVA and UVB radiation with an SPF of 30 or greater. Reapply every 2 to 3 hours or after sweating, drying off with a towel, or swimming. · Always wear a seat belt when traveling in a car. Always wear a helmet when riding a bicycle or motorcycle. Patient Education        Learning About Sleeping Well  What does sleeping well mean?      Sleeping well means getting stretch out comfortably, especially if you have a sleep partner.  Keep your bedroom quiet, dark, and cool. Use curtains, blinds, or a sleep mask to block out light. To block out noise, use earplugs, soothing music, or a \"white noise\" machine. Your evening and bedtime routine    Create a relaxing bedtime routine. You might want to take a warm shower or bath, or listen to soothing music.  Go to bed at the same time every night. And get up at the same time every morning, even if you feel tired. What to avoid    Limit caffeine (coffee, tea, caffeinated sodas) during the day, and don't have any for at least 6 hours before bedtime.  Avoid drinking alcohol before bedtime. Alcohol can cause you to wake up more often during the night.  Try not to smoke or use tobacco, especially in the evening. Nicotine can keep you awake.  Limit naps during the day, especially close to bedtime.  Avoid lying in bed awake for too long. If you can't fall asleep or if you wake up in the middle of the night and can't get back to sleep within about 20 minutes, get out of bed and go to another room until you feel sleepy.  Avoid taking medicine right before bed that may keep you awake or make you feel hyper or energized. Your doctor can tell you if your medicine may do this and if you can take it earlier in the day. If you can't sleep    Imagine yourself in a peaceful, pleasant scene. Focus on the details and feelings of being in a place that is relaxing.  Get up and do a quiet or boring activity until you feel sleepy.  Avoid drinking any liquids before going to bed to help prevent waking up often to use the bathroom. Where can you learn more? Go to https://[facility]. org and sign in to your Vie[employer] account. Enter Q501 in the Achieve X box to learn more about \"Learning About Sleeping Well. \"     If you do not have an account, please click on the \"Sign Up Now\" link.   Current as of: February 9, 2022               Content Version: 13.3  © 7787-1128 [employer]wise, Incorporated. Care instructions adapted under license by [facility] ([facility]). If you have questions about a medical condition or this instruction, always ask your healthcare professional. [address] any warranty or liability for your use of this information.

## 2022-06-29 LAB
EST. AVERAGE GLUCOSE BLD GHB EST-MCNC: 128 MG/DL
HBA1C MFR BLD: 6.1 % (ref 4.2–6.3)

## 2022-06-29 NOTE — ADDENDUM NOTE
Addended by: Felipe Sampson on: 6/29/2022 08:30 AM     Modules accepted: Orders
Attending Attestation (For Attendings USE Only)...

## 2022-07-19 RX ORDER — VENLAFAXINE HYDROCHLORIDE 75 MG/1
CAPSULE, EXTENDED RELEASE ORAL
Qty: 270 CAPSULE | Refills: 0 | Status: SHIPPED | OUTPATIENT
Start: 2022-07-19

## 2022-07-19 NOTE — TELEPHONE ENCOUNTER
Pt returned call. Pt stated she has appointment with Sleep Medicine on 8/4. Pt stated she has tried to email psych but has not had a response  Advised to try to call [address] [facility], [facility] number. Pt stated she will need a refill on the Effexor.   [facility]

## 2022-07-25 NOTE — TELEPHONE ENCOUNTER
[address] [facility], pt has appointment scheduled for 8/1/22 @ 1:40pm

## 2022-08-03 NOTE — PROGRESS NOTES
[address]: New Patient Evaluation. [address] [facility] [place], [address] [facility] [address], [address] [facility]  [phone]    Patient Name:  Ras Ferris    YOB: 1955            Date of Service:  8/4/2022    Chief Complaint   Patient presents with    New Patient       History of Present Illness:  Patient was sent by Dr. Nelsy Matamoros for evaluation of insomnia. Patient indicates that she has insomnia has been going on for over 5 years. She indicates she cannot find a exact reason for why it is happening. Not really reporting any increase in family issues with her , home or with her children. She does report that they moved from [place] in January 2 here this February. She indicates both her sons are here. However she does not report that has changed her insomnia at all. The biggest issue is that she cannot shut off her mind before she goes to sleep. She does get anxious around bedtime, since she knows she will be staying awake for 30 minutes or more. She is not ruminating about any specific thing or event. Patient does have depression and currently indicates that it is better at this point. Please note she is taking Effexor, Lamictal and Lexapro for it. Recent looking over her medications it seems that they have added in Desyrel 75 mg as well as gabapentin. She believes the gabapentin is not only for insomnia but for essential tremors. She indicated she has been taking the gabapentin the last 2 nights and finally she has been able to sleep for more than 5 hours and feels slightly better. Patient does not take any stimulant therapy. She does drink a soda maybe about 2 times per week. She has never had any formal sleep testing and currently her [place] score is 7/24.       DIAGNOSTIC TESTS/RESULTS    NICOLE-7 SCREENING 6/28/2022   Feeling nervous, anxious, or on edge Not at all   Not being able to stop or control worrying Not at all   Worrying too much about different things Not at all Trouble relaxing Not at all   Being so restless that it is hard to sit still Not at all   Becoming easily annoyed or irritable Not at all   Feeling afraid as if something awful might happen Not at all   NICOLE-7 Total Score 0     NICOLE-7 SCREENING 2022   Feeling nervous, anxious, or on edge Not at all   Not being able to stop or control worrying Not at all   Worrying too much about different things Not at all   Trouble relaxing Not at all   Being so restless that it is hard to sit still Not at all   Becoming easily annoyed or irritable Not at all   Feeling afraid as if something awful might happen Not at all   NICOLE-7 Total Score 0       Past Medical History:   Diagnosis Date    Anxiety     Asthma     Depression     Elevated LDL cholesterol level     Essential tremor     [facility]     Osteoporosis     Prediabetes     RLS (restless legs syndrome)          Patient Active Problem List   Diagnosis    Anxiety    RLS (restless legs syndrome)    Morbid obesity ([facility]    Prediabetes    Elevated LDL cholesterol level    Essential tremor    Psychophysiological insomnia    Status post left knee replacement    Recurrent major depressive disorder, in full remission ([facility]    Asthma          Past Surgical History:   Procedure Laterality Date     SECTION      GASTRIC BYPASS SURGERY      MALIGNANT SKIN LESION EXCISION      TONSILLECTOMY      TOTAL KNEE ARTHROPLASTY Left          Social History     Socioeconomic History    Marital status:      Spouse name: Deepika Kessler    Number of children: 3    Years of education: Not on file    Highest education level: Not on file   Occupational History    Occupation: Previously worked as a teacher   Tobacco Use    Smoking status: Never    Smokeless tobacco: Never   Vaping Use    Vaping Use: Never used   Substance and Sexual Activity    Alcohol use: Never    Drug use: Never    Sexual activity: Not on file   Other Topics Concern    Not on file   Social History Narrative Patient has 2 dogs that sleep in the same room with her but not in the bed. Patient drinks soda 2 times per week     Social Determinants of Health     Financial Resource Strain: Not on file   Food Insecurity: Not on file   Transportation Needs: Not on file   Physical Activity: Insufficiently Active    Days of Exercise per Week: 1 day    Minutes of Exercise per Session: 30 min   Stress: Not on file   Social Connections: Not on file   Intimate Partner Violence: Not on file   Housing Stability: Not on file            Family History   Problem Relation Age of Onset    Cancer Mother     Lung Cancer Mother     Cancer Sister     Melanoma Sister          Allergies   Allergen Reactions    Bupropion Other (See Comments)     headache    Penicillins Rash         Current Outpatient Medications   Medication Sig    venlafaxine (EFFEXOR XR) 75 MG extended release capsule TAKE 3 CAPSULES BY MOUTH EVERY DAY    vitamin B-12 (CYANOCOBALAMIN) 1000 MCG tablet Take 3,000 mcg by mouth daily     Cholecalciferol (VITAMIN D3) 1.25 MG (05034 UT) CAPS Take by mouth    vitamin C (ASCORBIC ACID) 500 MG tablet Take 500 mg by mouth daily    albuterol sulfate HFA (VENTOLIN HFA) 108 (90 Base) MCG/ACT inhaler Inhale 2 puffs into the lungs 4 times daily as needed for Wheezing or Shortness of Breath    lamoTRIgine (LAMICTAL) 100 MG tablet Take 1 tablet by mouth daily    escitalopram (LEXAPRO) 20 MG tablet Take 1 tablet by mouth daily    lamoTRIgine (LAMICTAL) 25 MG tablet Take 1 tablet by mouth daily    traZODone (DESYREL) 50 MG tablet TAKE 1 TABLET BY MOUTH NIGHTLY. INDICATIONS: INSOMNIA ASSOCIATED WITH DEPRESSION    fluticasone propionate (FLOVENT DISKUS) 100 MCG/BLIST AEPB inhaler 1 puff twice daily.   Brush teeth after each use    MAGNESIUM PO Take 200 mg by mouth     ZINC PO Take by mouth    primidone (MYSOLINE) 50 MG tablet Take 3 tablets by mouth 2 times daily    topiramate (TOPAMAX) 25 MG tablet Take 25 mg by mouth daily     No current facility-administered medications for this visit. SUBJECTIVE:  Review of Systems   Constitutional:  Positive for fatigue. HENT:          Positive for snoring   Eyes: Negative. Respiratory: Negative. Cardiovascular: Negative. Gastrointestinal: Negative. Endocrine: Negative. Genitourinary:         Positive for nocturia about 2 times per night   Musculoskeletal: Negative. Skin: Negative. Neurological:  Positive for tremors. Negative for weakness. Hematological: Negative. Psychiatric/Behavioral:  Positive for decreased concentration and sleep disturbance. Negative for suicidal ideas. The patient is nervous/anxious. Allergies   Allergen Reactions    Bupropion Other (See Comments)     headache    Penicillins Rash      There is no immunization history on file for this patient. Current Outpatient Medications   Medication Sig Dispense Refill    venlafaxine (EFFEXOR XR) 75 MG extended release capsule TAKE 3 CAPSULES BY MOUTH EVERY  capsule 0    vitamin B-12 (CYANOCOBALAMIN) 1000 MCG tablet Take 3,000 mcg by mouth daily       Cholecalciferol (VITAMIN D3) 1.25 MG (33614 UT) CAPS Take by mouth      vitamin C (ASCORBIC ACID) 500 MG tablet Take 500 mg by mouth daily      albuterol sulfate HFA (VENTOLIN HFA) 108 (90 Base) MCG/ACT inhaler Inhale 2 puffs into the lungs 4 times daily as needed for Wheezing or Shortness of Breath 18 g 5    lamoTRIgine (LAMICTAL) 100 MG tablet Take 1 tablet by mouth daily 30 tablet 2    escitalopram (LEXAPRO) 20 MG tablet Take 1 tablet by mouth daily 30 tablet 2    lamoTRIgine (LAMICTAL) 25 MG tablet Take 1 tablet by mouth daily 30 tablet 2    traZODone (DESYREL) 50 MG tablet TAKE 1 TABLET BY MOUTH NIGHTLY. INDICATIONS: INSOMNIA ASSOCIATED WITH DEPRESSION 30 tablet 2    fluticasone propionate (FLOVENT DISKUS) 100 MCG/BLIST AEPB inhaler 1 puff twice daily.   Brush teeth after each use 60 each 3    MAGNESIUM PO Take 200 mg by mouth       ZINC PO Take by mouth      primidone (MYSOLINE) 50 MG tablet Take 3 tablets by mouth 2 times daily      topiramate (TOPAMAX) 25 MG tablet Take 25 mg by mouth daily       No current facility-administered medications for this visit. Past Medical History:   Diagnosis Date    Anxiety     Asthma     Depression     Elevated LDL cholesterol level     Essential tremor     Melanoma in situ (HCC)     Osteoporosis     Prediabetes     RLS (restless legs syndrome)       Past Surgical History:   Procedure Laterality Date     SECTION      GASTRIC BYPASS SURGERY      MALIGNANT SKIN LESION EXCISION      TONSILLECTOMY      TOTAL KNEE ARTHROPLASTY Left       Family History   Problem Relation Age of Onset    Cancer Mother     Lung Cancer Mother     Cancer Sister     Melanoma Sister       Social History     Tobacco Use    Smoking status: Never    Smokeless tobacco: Never   Vaping Use    Vaping Use: Never used   Substance Use Topics    Alcohol use: Never    Drug use: Never       OBJECTIVE:  Physical Exam:  VITALS  /74 (Site: Right Upper Arm, Position: Sitting)   Pulse 83   Temp 97.5 °F (36.4 °C)   Resp 14   Ht 5' 7\" (1.702 m)   Wt 250 lb (113.4 kg)   SpO2 96%   BMI 39.16 kg/m²       Constitutional:  the patient is well developed and in no acute distress  EENMT:  Sclera clear, pupils equal, oral mucosa moist, narrow oral airway Modified Smart Stage 4, Nares are patent bilateral  Respiratory: CTA b/l. No Wheezing or Rhonchi. Cardiovascular:  RRR without M,G,R  Gastrointestinal: soft and non-tender; with positive bowel sounds. Musculoskeletal: warm without cyanosis. There is No lower leg edema. Skin:  no jaundice or rashes, no visible wounds   Neurologic: no gross neuro deficits     Psychiatric:  alert and oriented x 3            ASSESSMENT/PLAN:  (Medical Decision Making)       ICD-10-CM    1.  Suspected sleep apnea  R29.818 -Patient is at risk for sleep apnea given narrow airway, nonrestorative sleep 0 out of 7 days out of the week, snoring, nocturnal choking and witnessed apneas. We will set the patient up for home polysomnogram and told her she can continue the current medications she is on so we can assess if she really has sleep apnea or not. If positive agrees to AutoPap therapy via [facility] Nasal Mask    - The pathophysiology of obstructive sleep apnea was reviewed with the patient. It's potential to promote severe neurologic, cardiac, pulmonary, and gastrointestinal problems was discussed. Specifically, the increased incidence of hypertension, coronary artery disease, congestive heart failure, pulmonary hypertension, gastroesophageal reflux, pathologic hypersomnolence, memory loss, and glucose intolerance was related to the consequences of hypoxemia, hypercapnia, airway obstruction, and sympathetic overdrive. We also discussed the ability of nasal CPAP to correct these abnormalities through maintenance of a patent airway. Therapeutic options including surgery, oral appliances, and weight loss were also reviewed. 2. Snoring  R06.83 -See above      3. Psychophysiological insomnia  F51.04 -Patient has insomnia is not really able to shut off her brain. We talked about different ways develop a sleep routine/ritual to transition to bedtime. We also talked about some cognitive behavioral therapy including sleep restriction to see if that would help her. 4. Insomnia, unspecified type  G47.00 -Multifocal goal and likely related to the above issues and below issues. See above      5. Depression, unspecified depression type  F32. A -Follow-up with PMD, currently on 2 medications and will be seeing psychiatry. Currently reports his condition is better particularly after sleeping the last 2 nights. SUMMARY:    --as per above    Weight loss was encouraged through the reduction of caloric intake as well as an increase in aerobic physical activity. Sleep hygiene was discussed with the patient today.     Did review [place] score, and the physiology of sleep apnea today    All questions are answered to the patient's satisfaction. The patient will call for further questions and concerns. Follow up at the [address] [place] [address] will be in 4months. Follow up will be with the patient's referring physician as had been previously scheduled. Kita Luu MD    Over 50% of today's office visit was spent in face to face time reviewing test results, prognosis, importance of compliance, education about disease process, benefits of medications, instructions for management of acute flare-ups, and follow up plans. .    Electronically signed and dictated. Please note if there are errors this is  likely a result of the dictation software. No orders of the defined types were placed in this encounter. No orders of the defined types were placed in this encounter.

## 2022-08-04 ENCOUNTER — OFFICE VISIT (OUTPATIENT)
Dept: SLEEP MEDICINE | Age: 67
End: 2022-08-04
Payer: MEDICARE

## 2022-08-04 VITALS
BODY MASS INDEX: 39.24 KG/M2 | RESPIRATION RATE: 14 BRPM | DIASTOLIC BLOOD PRESSURE: 74 MMHG | OXYGEN SATURATION: 96 % | TEMPERATURE: 97.5 F | SYSTOLIC BLOOD PRESSURE: 136 MMHG | HEIGHT: 67 IN | HEART RATE: 83 BPM | WEIGHT: 250 LBS

## 2022-08-04 DIAGNOSIS — R29.818 SUSPECTED SLEEP APNEA: Primary | ICD-10-CM

## 2022-08-04 DIAGNOSIS — F51.04 PSYCHOPHYSIOLOGICAL INSOMNIA: ICD-10-CM

## 2022-08-04 DIAGNOSIS — F32.A DEPRESSION, UNSPECIFIED DEPRESSION TYPE: ICD-10-CM

## 2022-08-04 DIAGNOSIS — G47.00 INSOMNIA, UNSPECIFIED TYPE: ICD-10-CM

## 2022-08-04 DIAGNOSIS — R06.83 SNORING: ICD-10-CM

## 2022-08-04 PROCEDURE — G8417 CALC BMI ABV UP PARAM F/U: HCPCS | Performed by: INTERNAL MEDICINE

## 2022-08-04 PROCEDURE — 1090F PRES/ABSN URINE INCON ASSESS: CPT | Performed by: INTERNAL MEDICINE

## 2022-08-04 PROCEDURE — 99204 OFFICE O/P NEW MOD 45 MIN: CPT | Performed by: INTERNAL MEDICINE

## 2022-08-04 PROCEDURE — 3017F COLORECTAL CA SCREEN DOC REV: CPT | Performed by: INTERNAL MEDICINE

## 2022-08-04 PROCEDURE — 1036F TOBACCO NON-USER: CPT | Performed by: INTERNAL MEDICINE

## 2022-08-04 PROCEDURE — G8400 PT W/DXA NO RESULTS DOC: HCPCS | Performed by: INTERNAL MEDICINE

## 2022-08-04 PROCEDURE — G8427 DOCREV CUR MEDS BY ELIG CLIN: HCPCS | Performed by: INTERNAL MEDICINE

## 2022-08-04 PROCEDURE — 1123F ACP DISCUSS/DSCN MKR DOCD: CPT | Performed by: INTERNAL MEDICINE

## 2022-08-04 ASSESSMENT — SLEEP AND FATIGUE QUESTIONNAIRES
HOW LIKELY ARE YOU TO NOD OFF OR FALL ASLEEP WHILE SITTING AND READING: 1
HOW LIKELY ARE YOU TO NOD OFF OR FALL ASLEEP WHEN YOU ARE A PASSENGER IN A CAR FOR AN HOUR WITHOUT A BREAK: 1
HOW LIKELY ARE YOU TO NOD OFF OR FALL ASLEEP WHILE SITTING INACTIVE IN A PUBLIC PLACE: 0
HOW LIKELY ARE YOU TO NOD OFF OR FALL ASLEEP IN A CAR, WHILE STOPPED FOR A FEW MINUTES IN TRAFFIC: 0
ESS TOTAL SCORE: 7
HOW LIKELY ARE YOU TO NOD OFF OR FALL ASLEEP WHILE LYING DOWN TO REST IN THE AFTERNOON WHEN CIRCUMSTANCES PERMIT: 2
HOW LIKELY ARE YOU TO NOD OFF OR FALL ASLEEP WHILE WATCHING TV: 1
HOW LIKELY ARE YOU TO NOD OFF OR FALL ASLEEP WHILE SITTING QUIETLY AFTER LUNCH WITHOUT ALCOHOL: 2
HOW LIKELY ARE YOU TO NOD OFF OR FALL ASLEEP WHILE SITTING AND TALKING TO SOMEONE: 0

## 2022-08-04 ASSESSMENT — ENCOUNTER SYMPTOMS
RESPIRATORY NEGATIVE: 1
EYES NEGATIVE: 1
GASTROINTESTINAL NEGATIVE: 1

## 2022-08-04 NOTE — PATIENT INSTRUCTIONS
What is a Home Sleep Test?    A home sleep apnea test is just as it sounds; a sleep test your patients can take from their own home to determine if they have sleep apnea. Your patient may come into the sleep center to  equipment for their home sleep test. Andie Matos demonstrate to them how to perform the test, including how to use the machine and equipment properly. The patient goes home and performs the test.  The data obtained is stored on the equipment and is uploaded at the sleep center the following day. Home sleep tests are designed to provide your patients with a convenient way to get the test done while allowing you to collect important information on how they sleep and make a diagnosis of FRED. A home sleep test comes with devices that your patient can easily apply themselves and includes:    A belt that measures respiratory effort and is placed around the upper chest.    A small nasal cannula that measures airflow    A finger clip that measures blood oxygen saturation      What is the process and what happens during a Home Sleep Test?    On the day your patient plans on taking the test, they should:    Avoid napping    Follow their normal routine as much as they can. Avoid caffeine after they eat lunch. They should let you know if they're taking regular medication, since the physician may want to have them discontinue it temporarily, depending on what it is. Before the patient's home sleep test, they'll come by sleep clinic to  the equipment. They can also opt to have hit delivered to their home. Provide them with instructions on how to use the home test and equipment and be ready for any questions they may have on things they don't understand. Let them know they can go to bed at their regular time. When they're ready to go to sleep, they'll attach the sensors to their body like you instructed.   They may need to press a button on the device when they're getting into bed, and most patients are asked to keep a sleep log. The sensors typically include a sensor that slips over the patient's index finger to monitor their oxygen levels and heart rate, and another set of sensors that monitors their breathing patterns. Your patient straps these sensors on their chest and places a small tube similar to an oxygen cannula in their nostrils to measure airflow through their nose. When your patient wakes up the following morning, they'll remove the sensors. They either return the device back through the mail or bring it to the sleep clinic. Sleep Hygiene Instructions    Sleep only as much as you need to feel refreshed during the following day. Restricting your time in bed helps to consolidate and deepen your sleep. Excessively long times in bed lead to fragmented and shallow sleep. Get up at your regular time the next day, no matter how little your slept. Get up at the same time each day, 7 days a week. A regular wake time in the morning leads to regular times on sleep onset, and helps to set your biological clock. Exercise regularly. Schedule exercise times so that they do not occur within 3 hours of when you intend to go to bed. Exercise makes it easier to initiate sleep and deepen sleep. Don't take your problems to bed. Plan some time earlier in the evening for working on your problems or planning the next day's activities. Worrying may interfere with initiating sleep and produce shallow sleep. Train yourself to use the bedroom only for sleep and sexual activity. This will help condition your brain to see bed as the place for sleeping. Do not read, watch TV or eat in bed. Do not try and fall asleep. This only makes the problem worse. Instead, turn on the light, leave the bedroom, and do something different like reading a book. Don't engage in stimulating activity. Return to bed only when you feel sleepy. Avoid long naps.  Staying awake during the day helps to fall asleep at night. Naps totalling more than 30 minutes increase your chances of having trouble sleeping at night. Make sure that your bedroom is comfortable and free from light and noise. A comfortable, noise-free sleep environment will reduce the likelihood that you will wake up during the night. Noise that does not awaken you may disturb the quality of your sleep. Carpeting, insulated curtains, and closing the door may help. Make sure that your bedroom is at a comfortable temperature during the night. Excessively warm or cold sleep environments may disturb sleep. Eat regular meals and di not go to bed hungry. Hunger may disturb sleep. A light snack at bedtime (especially carbohydrates) may help sleep, but avoid greasy or heavy foods. Avoid excessive liquids in the evening. Reducing liquid intake will minimize the need for night-time trips to the bathroom. Cut down on all caffeine products. Caffeinated beverages and food (Coffee, tea, cola, chocolate) can cause difficulty falling asleep, awakenings during the night, and shallow sleep. Even caffeine early in the day can disrupt night-time sleep. Avoid alcohol, especially in the evening. Although alcohol helps tense people fall asleep more easily, it causes awakenings later in the night. Smoking may disturb sleep. Nicotine is a stimulant. Try not to smoke during the night when you have trouble sleeping. Learning about Sleeping Well    What does sleeping well mean? Sleeping well means getting enough sleep. How much sleep is enough varies among people. The number of hours you sleep is not as improtant as how you feel when you wake up. If you do not feel refreshed, you probably need more sleep. Another sign of not getting enough sleep is feeling tired during the day. The average totally nightly sleep time is 7 1/2 to 8 hours. Healthy adults may need a little more or a little less than this.     Why is getting enough sleep important? Getting enough quality sleep is a basic part of good health. When your sleep suffers, your mood and your thoughts can suffer too. Your thoughts can suffer too. You ma find yourself feeling more grumpy or stressed. No getting enough sleep also can lead to serious problems, including injury, accidents, anxiety, and depression. What might cause poor sleeping? Many things can cause sleep problems, including:    Stress. Stress can be caused by fear about a single event, such as giving a speech. Or you may have ongoing stress, such as worry about work or school. Depression, anxiety, and other mental or emotional conditions. Changes in your sleep habits or surroundings. This includes changes that happen where you sleep, such as noise, light, or sleeping in a different bed. It also includes changes in your sleep pattern, such as having jet lab or working a late shift. Health problems, such as pain, breathing problems, and restless legs syndrome. Lack of regular exercise. How can you help yourself? Here are some tips that may help you sleep more soundly and wake up feeling more refreshed. Your sleeping area    Use your bedroom only for sleeping and sex. A bit of light reading may help you fall asleep. But if it doesn't, do your reading elsewhere in the house. Don't watch TV in bed. Be sure your bed is big enough to stretch out comfortable, especially if you have a sleep partner. Keep your bedroom quiet, dark, and cool. Use curtains, blinds, or sleep mask to block out light. To block out noise, use earplugs, soothing music, or a \"white noise\" machine. Your evening and bedtime routine    Create a relaxing bedtime routine. You might want to take a warm shower or bath, listen to soothing music, or drink a cup of non-caffeinated tea. Go to bed at the same time every night. And get up at the same time every morning, even if you feel tired.     What to avoid:    Limit caffeine (coffee, tea, caffeinated sodas) during the day, and dont have any for at least 4 to 6 hours before bedtime. Don't drink alcohol before bedtime. Alcohol can cause you to wake up more often during the night. Don't smoke or use tobacco, especially in the evening. Nicotine can keep you awake. Don't like in bed away for too long. If you can't fall asleep, or if you wake up in the middle of the night and can't get back to sleep within 15 minutes or so, get out of bed and go to another room until you feel sleepy. Don't take medicine right before bed that may keep you awake or make you feel hyper or enegerized. Your doctor can tell you if your medicine may do this and if you can take it earlier in the day. If you can't sleep:    Imagine yourself in a peaceful, pleasant scene. Focus on the details and feelings of being in a place that is relaxing. Get up and do a quiet or boring activity until you feel sleepy. Don't drink liquids after 6 p.m. if you wake up often because you have to go to the bathroom. Where can you learn more? Go to http://www.gray.com/    Enter T578 in the search box to learn more about \"Learning About Sleeping Well. \"

## 2022-09-24 DIAGNOSIS — F33.1 MODERATE EPISODE OF RECURRENT MAJOR DEPRESSIVE DISORDER (HCC): ICD-10-CM

## 2022-09-24 DIAGNOSIS — F41.9 ANXIETY: ICD-10-CM

## 2022-09-25 RX ORDER — LAMOTRIGINE 25 MG/1
TABLET ORAL
Qty: 90 TABLET | OUTPATIENT
Start: 2022-09-25

## 2022-09-25 RX ORDER — LAMOTRIGINE 100 MG/1
TABLET ORAL
Qty: 90 TABLET | OUTPATIENT
Start: 2022-09-25

## 2022-10-06 ENCOUNTER — HOSPITAL ENCOUNTER (OUTPATIENT)
Dept: SLEEP CENTER | Age: 67
Discharge: HOME | End: 2022-10-09
Payer: MEDICARE

## 2022-10-06 PROCEDURE — 95806 SLEEP STUDY UNATT&RESP EFFT: CPT

## 2022-10-13 ENCOUNTER — TELEPHONE (OUTPATIENT)
Dept: PULMONOLOGY | Age: 67
End: 2022-10-13

## 2022-11-18 DIAGNOSIS — G25.0 ESSENTIAL TREMOR: Primary | ICD-10-CM

## 2022-11-21 RX ORDER — TOPIRAMATE 25 MG/1
25 TABLET ORAL 2 TIMES DAILY
Qty: 60 TABLET | Refills: 5 | Status: SHIPPED | OUTPATIENT
Start: 2022-11-21 | End: 2022-11-23 | Stop reason: SDUPTHER

## 2022-11-23 ENCOUNTER — OFFICE VISIT (OUTPATIENT)
Dept: NEUROLOGY | Age: 67
End: 2022-11-23
Payer: MEDICARE

## 2022-11-23 VITALS
SYSTOLIC BLOOD PRESSURE: 114 MMHG | DIASTOLIC BLOOD PRESSURE: 66 MMHG | HEART RATE: 93 BPM | BODY MASS INDEX: 39.18 KG/M2 | HEIGHT: 67 IN | WEIGHT: 249.6 LBS

## 2022-11-23 DIAGNOSIS — G25.0 ESSENTIAL TREMOR: Primary | ICD-10-CM

## 2022-11-23 PROCEDURE — G8484 FLU IMMUNIZE NO ADMIN: HCPCS | Performed by: PSYCHIATRY & NEUROLOGY

## 2022-11-23 PROCEDURE — 99214 OFFICE O/P EST MOD 30 MIN: CPT | Performed by: PSYCHIATRY & NEUROLOGY

## 2022-11-23 PROCEDURE — G8400 PT W/DXA NO RESULTS DOC: HCPCS | Performed by: PSYCHIATRY & NEUROLOGY

## 2022-11-23 PROCEDURE — 1090F PRES/ABSN URINE INCON ASSESS: CPT | Performed by: PSYCHIATRY & NEUROLOGY

## 2022-11-23 PROCEDURE — 1123F ACP DISCUSS/DSCN MKR DOCD: CPT | Performed by: PSYCHIATRY & NEUROLOGY

## 2022-11-23 PROCEDURE — 1036F TOBACCO NON-USER: CPT | Performed by: PSYCHIATRY & NEUROLOGY

## 2022-11-23 PROCEDURE — G8417 CALC BMI ABV UP PARAM F/U: HCPCS | Performed by: PSYCHIATRY & NEUROLOGY

## 2022-11-23 PROCEDURE — G8427 DOCREV CUR MEDS BY ELIG CLIN: HCPCS | Performed by: PSYCHIATRY & NEUROLOGY

## 2022-11-23 PROCEDURE — 3017F COLORECTAL CA SCREEN DOC REV: CPT | Performed by: PSYCHIATRY & NEUROLOGY

## 2022-11-23 RX ORDER — TOPIRAMATE 50 MG/1
50 TABLET, FILM COATED ORAL 3 TIMES DAILY
Qty: 90 TABLET | Refills: 5 | Status: SHIPPED | OUTPATIENT
Start: 2022-11-23

## 2022-11-23 NOTE — PATIENT INSTRUCTIONS
Some other options for essential tremor:    1. Deep brain stimulation. This is the implant that is connected to a pacemaker that helps control the tremors. This would be done at an academic center such as in [place] or [facility]. 2.  Focused ultrasound. Currently the closest place is [facility] ([place]). 3.  The Simi Trio wrist device.

## 2022-11-23 NOTE — PROGRESS NOTES
Onelia Bishop  [address], [address] [place] [address], [address] [place] [address]  Phone: [phone] Fax [phone]  Lazaro Sanchez MD      Patient: Karrie Allen  Provider: Lazaro Sanchez MD    CC:   Chief Complaint   Patient presents with    Follow-up     Medicine check    Tremors     Referring Provider:    History of Present Illness:     Karrie Allen is a 77 y.o. RH female who presents for follow-up of tremor. She is accompanied for today's visit. She presents for further evaluation of tremor of the hands which has been present over the last 5 years. Tremor is more noticeable with action and may affect tasks requiring dexterity such as eating with utensils and holding drink steady. Denies any tremor of the head or voice. Only family history of tremor to be noted is a paternal uncle with history of tremors. No other family history of Parkinson's disease. Current medications include:  Primidone 150 mg 4 times a day  Topiramate 50 mg twice a day (currently taking it 3 times a day)     Previous medication trials include: Propranolol     Patient presents today for follow-up. She has been taking topiramate but has actually found taking it 3 times a day has been more beneficial and she tends to have some wearing off effect in the afternoons, at which time she experiences more breakthrough tremor. It has been well-tolerated. Tremor can be problematic particularly on the right on the right when trying to use utensils and try to hold a drink steady. Review of Systems:   Review of Systems   Constitutional:  Negative for fever. HENT:  Negative for hearing loss. Eyes:  Negative for visual disturbance. Respiratory:  Negative for cough. Cardiovascular:  Negative for chest pain. Gastrointestinal:  Negative for abdominal pain. Genitourinary:  Negative for dysuria. Musculoskeletal:  Negative for gait problem. Skin:  Negative for rash.    Allergic/Immunologic: Negative for immunocompromised state. Neurological:  Positive for tremors. Psychiatric/Behavioral:  Negative for confusion. Lab/Imaging Review:   I REVIEWED PERTINENT LABS, IMAGES, AND REPORTS WITH THE PATIENT PERSONALLY, DIRECTLY AND FULLY. THE MOST PERTINENT FINDINGS ARE NOTED BELOW:    N/A    Past Medical History:     Past medical history, surgical history, social history, family history, medications, and allergies were reviewed and updated as appropriate. PAST MEDICAL HISTORY:  Past Medical History:   Diagnosis Date    Anxiety     Asthma     Depression     Elevated LDL cholesterol level     Essential tremor     Melanoma in situ (HCC)     Osteoporosis     Prediabetes     RLS (restless legs syndrome)      PAST SURGICAL HISTORY:   Past Surgical History:   Procedure Laterality Date     SECTION      GASTRIC BYPASS SURGERY      MALIGNANT SKIN LESION EXCISION      TONSILLECTOMY      TOTAL KNEE ARTHROPLASTY Left      FAMILY HISTORY:  Family History   Problem Relation Age of Onset    Cancer Mother     Lung Cancer Mother     Cancer Sister     Melanoma Sister       SOCIAL HISTORY:  Social History     Socioeconomic History    Marital status:      Spouse name: Crispin Wilhelm    Number of children: 3    Years of education: None    Highest education level: None   Occupational History    Occupation: Previously worked as a teacher   Tobacco Use    Smoking status: Never    Smokeless tobacco: Never   Vaping Use    Vaping Use: Never used   Substance and Sexual Activity    Alcohol use: Never    Drug use: Never   Social History Narrative    Patient has 2 dogs that sleep in the same room with her but not in the bed.     Patient drinks soda 2 times per week     Social Determinants of Health     Physical Activity: Insufficiently Active    Days of Exercise per Week: 1 day    Minutes of Exercise per Session: 30 min       Medications/Allergies:     MEDICATIONS:   Outpatient Encounter Medications as of 2022   Medication Sig Dispense Refill    topiramate (TOPAMAX) 50 MG tablet Take 1 tablet by mouth in the morning, at noon, and at bedtime 90 tablet 5    venlafaxine (EFFEXOR XR) 75 MG extended release capsule TAKE 3 CAPSULES BY MOUTH EVERY  capsule 0    vitamin B-12 (CYANOCOBALAMIN) 1000 MCG tablet Take 3,000 mcg by mouth daily       Cholecalciferol (VITAMIN D3) 1.25 MG (61555 UT) CAPS Take by mouth      vitamin C (ASCORBIC ACID) 500 MG tablet Take 500 mg by mouth daily      albuterol sulfate HFA (VENTOLIN HFA) 108 (90 Base) MCG/ACT inhaler Inhale 2 puffs into the lungs 4 times daily as needed for Wheezing or Shortness of Breath 18 g 5    lamoTRIgine (LAMICTAL) 100 MG tablet Take 1 tablet by mouth daily 30 tablet 2    escitalopram (LEXAPRO) 20 MG tablet Take 1 tablet by mouth daily 30 tablet 2    lamoTRIgine (LAMICTAL) 25 MG tablet Take 1 tablet by mouth daily 30 tablet 2    traZODone (DESYREL) 50 MG tablet TAKE 1 TABLET BY MOUTH NIGHTLY. INDICATIONS: INSOMNIA ASSOCIATED WITH DEPRESSION 30 tablet 2    fluticasone propionate (FLOVENT DISKUS) 100 MCG/BLIST AEPB inhaler 1 puff twice daily. Brush teeth after each use 60 each 3    MAGNESIUM PO Take 200 mg by mouth       ZINC PO Take by mouth      primidone (MYSOLINE) 50 MG tablet Take 3 tablets by mouth 2 times daily      [DISCONTINUED] topiramate (TOPAMAX) 25 MG tablet Take 1 tablet by mouth 2 times daily 60 tablet 5     No facility-administered encounter medications on file as of 11/23/2022. ALLERGIES:  Allergies   Allergen Reactions    Bupropion Other (See Comments)     headache    Penicillins Rash       Physical Exam:     /66   Pulse 93   Ht 5' 7\" (1.702 m)   Wt 249 lb 9.6 oz (113.2 kg)   BMI 39.09 kg/m²     General Exam:  General: Well developed, well nourished, in no apparent distress. HEENT: Normocephalic, atraumatic. Sclera anicteric. Oropharynx clear. Neck: Supple without masses  Cardiovascular: Regular rate and rhythm. No carotid bruits.    Lungs: Non-labored breathing. Abdomen: Soft, nontender, nondistended. Extremities: Peripheral pulses intact. No edema and no rashes. Neurological Exam:     MS/Language/Speech:  Alert. Oriented x 3. Language fluent. Speech normal.     Cranial Nerves: PERRL. Eye movements full with normal pursuits. No nystagmus. Face was symmetric with good activation and normal sensation. Tongue and palate were midline. Shoulder shrug symmetric. Motor: Strength was full in all proximal and distal muscle groups. Tone was normal.     Abnormal Movements: There is a mild postural and action tremor of the right hand and a milder tremor of the left hand with posture. Finger-nose-finger shows only slight tremor. Handwriting shows only mild evidence of tremor. Sensory: Normal to light touch throughout. Cerebellar: No ataxia or dysmetria with finger-nose-finger bilaterally. Reflexes (R/L): Biceps (2+/2+), Brachioradialis (2+/2+), Patellar (2+/2+), Ankle (1+/1+)  Keith's was negative and plantar responses were flexor. Gait: She is slow to rise from a seated position and walks with a mildly slowed but nonparkinsonian gait. Assessment and Plan:     Leisa Neal is a 77 y.o. female who presents with the following issues:     Martir Jaramillo was seen today for follow-up and tremors. Diagnoses and all orders for this visit:    Essential tremor  -     topiramate (TOPAMAX) 50 MG tablet; Take 1 tablet by mouth in the morning, at noon, and at bedtime    Patient presents for follow-up and continued management of a tremor of the hands which clinically has been most consistent with benign essential tremor. There is no concern for parkinsonism. For now we will have her continue primidone 150 mg 4 times a day. And I will have her increase her topiramate to 50 mg 3 times a day which she reports has been more beneficial for her.     We did discuss other procedural and nonpharmacologic options and she was given some information regarding these. Follow-up in 6 months. Signature: Jenelle Ga MD      Date:  11/25/2022    [facility]   [address] , [facility], [address] [place] [address]  Ph: [phone]  Fax: [phone]         I have personally interviewed and examined Mrs. Stanley Rice and I have personally reviewed all relevant records including labs and imaging as noted above. I have written all aspects of this note. More than 50% of this time was used for counseling regarding my diagnosis, prognosis, and plans for management. Total visit time: 35 minutes.

## 2022-11-25 ASSESSMENT — ENCOUNTER SYMPTOMS
ABDOMINAL PAIN: 0
COUGH: 0

## 2022-12-30 ENCOUNTER — NURSE ONLY (OUTPATIENT)
Dept: FAMILY MEDICINE CLINIC | Facility: CLINIC | Age: 67
End: 2022-12-30

## 2022-12-30 DIAGNOSIS — E78.00 ELEVATED LDL CHOLESTEROL LEVEL: ICD-10-CM

## 2022-12-30 DIAGNOSIS — R73.03 PREDIABETES: ICD-10-CM

## 2022-12-30 DIAGNOSIS — Z11.59 NEED FOR HEPATITIS C SCREENING TEST: ICD-10-CM

## 2022-12-30 LAB
ALBUMIN SERPL-MCNC: 3.2 G/DL (ref 3.2–4.6)
ALBUMIN/GLOB SERPL: 1.2 {RATIO} (ref 0.4–1.6)
ALP SERPL-CCNC: 103 U/L (ref 50–136)
ALT SERPL-CCNC: 22 U/L (ref 12–65)
ANION GAP SERPL CALC-SCNC: 2 MMOL/L (ref 2–11)
AST SERPL-CCNC: 11 U/L (ref 15–37)
BASOPHILS # BLD: 0 K/UL (ref 0–0.2)
BASOPHILS NFR BLD: 1 % (ref 0–2)
BILIRUB SERPL-MCNC: 0.4 MG/DL (ref 0.2–1.1)
BUN SERPL-MCNC: 21 MG/DL (ref 8–23)
CALCIUM SERPL-MCNC: 9.1 MG/DL (ref 8.3–10.4)
CHLORIDE SERPL-SCNC: 110 MMOL/L (ref 101–110)
CHOLEST SERPL-MCNC: 244 MG/DL
CO2 SERPL-SCNC: 28 MMOL/L (ref 21–32)
CREAT SERPL-MCNC: 0.9 MG/DL (ref 0.6–1)
DIFFERENTIAL METHOD BLD: ABNORMAL
EOSINOPHIL # BLD: 0.2 K/UL (ref 0–0.8)
EOSINOPHIL NFR BLD: 3 % (ref 0.5–7.8)
ERYTHROCYTE [DISTWIDTH] IN BLOOD BY AUTOMATED COUNT: 14.2 % (ref 11.9–14.6)
EST. AVERAGE GLUCOSE BLD GHB EST-MCNC: 123 MG/DL
GLOBULIN SER CALC-MCNC: 2.7 G/DL (ref 2.8–4.5)
GLUCOSE SERPL-MCNC: 106 MG/DL (ref 65–100)
HBA1C MFR BLD: 5.9 % (ref 4.8–5.6)
HCT VFR BLD AUTO: 42.7 % (ref 35.8–46.3)
HCV AB SER QL: NONREACTIVE
HDLC SERPL-MCNC: 68 MG/DL (ref 40–60)
HDLC SERPL: 3.6 {RATIO}
HGB BLD-MCNC: 13.3 G/DL (ref 11.7–15.4)
IMM GRANULOCYTES # BLD AUTO: 0 K/UL (ref 0–0.5)
IMM GRANULOCYTES NFR BLD AUTO: 0 % (ref 0–5)
LDLC SERPL CALC-MCNC: 152.2 MG/DL
LYMPHOCYTES # BLD: 1.4 K/UL (ref 0.5–4.6)
LYMPHOCYTES NFR BLD: 27 % (ref 13–44)
MCH RBC QN AUTO: 30.3 PG (ref 26.1–32.9)
MCHC RBC AUTO-ENTMCNC: 31.1 G/DL (ref 31.4–35)
MCV RBC AUTO: 97.3 FL (ref 82–102)
MONOCYTES # BLD: 0.4 K/UL (ref 0.1–1.3)
MONOCYTES NFR BLD: 7 % (ref 4–12)
NEUTS SEG # BLD: 3.2 K/UL (ref 1.7–8.2)
NEUTS SEG NFR BLD: 62 % (ref 43–78)
NRBC # BLD: 0 K/UL (ref 0–0.2)
PLATELET # BLD AUTO: 236 K/UL (ref 150–450)
PMV BLD AUTO: 10.5 FL (ref 9.4–12.3)
POTASSIUM SERPL-SCNC: 4.6 MMOL/L (ref 3.5–5.1)
PROT SERPL-MCNC: 5.9 G/DL (ref 6.3–8.2)
RBC # BLD AUTO: 4.39 M/UL (ref 4.05–5.2)
SODIUM SERPL-SCNC: 140 MMOL/L (ref 133–143)
TRIGL SERPL-MCNC: 119 MG/DL (ref 35–150)
VLDLC SERPL CALC-MCNC: 23.8 MG/DL (ref 6–23)
WBC # BLD AUTO: 5.2 K/UL (ref 4.3–11.1)

## 2023-01-04 ENCOUNTER — OFFICE VISIT (OUTPATIENT)
Dept: FAMILY MEDICINE CLINIC | Facility: CLINIC | Age: 68
End: 2023-01-04
Payer: MEDICARE

## 2023-01-04 VITALS
OXYGEN SATURATION: 98 % | WEIGHT: 250.4 LBS | BODY MASS INDEX: 39.3 KG/M2 | DIASTOLIC BLOOD PRESSURE: 72 MMHG | RESPIRATION RATE: 16 BRPM | SYSTOLIC BLOOD PRESSURE: 124 MMHG | HEART RATE: 73 BPM | HEIGHT: 67 IN

## 2023-01-04 DIAGNOSIS — H61.23 BILATERAL IMPACTED CERUMEN: ICD-10-CM

## 2023-01-04 DIAGNOSIS — F33.9 RECURRENT MAJOR DEPRESSIVE DISORDER, REMISSION STATUS UNSPECIFIED (HCC): ICD-10-CM

## 2023-01-04 DIAGNOSIS — E78.00 ELEVATED LDL CHOLESTEROL LEVEL: ICD-10-CM

## 2023-01-04 DIAGNOSIS — R73.03 PREDIABETES: Primary | ICD-10-CM

## 2023-01-04 DIAGNOSIS — Z12.31 ENCOUNTER FOR SCREENING MAMMOGRAM FOR BREAST CANCER: ICD-10-CM

## 2023-01-04 DIAGNOSIS — E66.01 SEVERE OBESITY (BMI 35.0-39.9) WITH COMORBIDITY (HCC): ICD-10-CM

## 2023-01-04 PROCEDURE — G8417 CALC BMI ABV UP PARAM F/U: HCPCS | Performed by: FAMILY MEDICINE

## 2023-01-04 PROCEDURE — 1090F PRES/ABSN URINE INCON ASSESS: CPT | Performed by: FAMILY MEDICINE

## 2023-01-04 PROCEDURE — 1036F TOBACCO NON-USER: CPT | Performed by: FAMILY MEDICINE

## 2023-01-04 PROCEDURE — G8400 PT W/DXA NO RESULTS DOC: HCPCS | Performed by: FAMILY MEDICINE

## 2023-01-04 PROCEDURE — 3017F COLORECTAL CA SCREEN DOC REV: CPT | Performed by: FAMILY MEDICINE

## 2023-01-04 PROCEDURE — G8427 DOCREV CUR MEDS BY ELIG CLIN: HCPCS | Performed by: FAMILY MEDICINE

## 2023-01-04 PROCEDURE — G8484 FLU IMMUNIZE NO ADMIN: HCPCS | Performed by: FAMILY MEDICINE

## 2023-01-04 PROCEDURE — 1123F ACP DISCUSS/DSCN MKR DOCD: CPT | Performed by: FAMILY MEDICINE

## 2023-01-04 PROCEDURE — 69209 REMOVE IMPACTED EAR WAX UNI: CPT | Performed by: FAMILY MEDICINE

## 2023-01-04 PROCEDURE — 99214 OFFICE O/P EST MOD 30 MIN: CPT | Performed by: FAMILY MEDICINE

## 2023-01-04 RX ORDER — LAMOTRIGINE 150 MG/1
TABLET ORAL
COMMUNITY
Start: 2022-11-28

## 2023-01-04 RX ORDER — LORAZEPAM 1 MG/1
TABLET ORAL
COMMUNITY
Start: 2022-12-20

## 2023-01-04 ASSESSMENT — ENCOUNTER SYMPTOMS
BLOOD IN STOOL: 0
ABDOMINAL PAIN: 0
SHORTNESS OF BREATH: 0
VOMITING: 0
COUGH: 0
NAUSEA: 0

## 2023-01-04 NOTE — PATIENT INSTRUCTIONS
Patient Education        Learning About the Mediterranean Diet  What is the [address] [facility]? The Mediterranean diet is a style of eating rather than a diet plan. It features foods eaten in [place], [place], [place] and [place], and other countries along the [facility]. It emphasizes eating foods like fish, fruits, vegetables, beans, high-fiber breads and whole grains, nuts, and olive oil. This style of eating includes limited red meat, cheese, and sweets. Why choose the Mediterranean diet? A Mediterranean-style diet may improve heart health. It contains more fat than other heart-healthy diets. But the fats are mainly from nuts, unsaturated oils (such as fish oils and olive oil), and certain nut or seed oils (such as canola, soybean, or flaxseed oil). These fats may help protect the heart and blood vessels. How can you get started on the Mediterranean diet? Here are some things you can do to switch to a more Mediterranean way of eating. What to eat  Eat a variety of fruits and vegetables each day, such as grapes, blueberries, tomatoes, broccoli, peppers, figs, olives, spinach, eggplant, beans, lentils, and chickpeas. Eat a variety of whole-grain foods each day, such as oats, brown rice, and whole wheat bread, pasta, and couscous. Eat fish at least 2 times a week. Try tuna, salmon, mackerel, lake trout, herring, or sardines. Eat moderate amounts of low-fat dairy products, such as milk, cheese, or yogurt. Eat moderate amounts of poultry and eggs. Choose healthy (unsaturated) fats, such as nuts, olive oil, and certain nut or seed oils like canola, soybean, and flaxseed. Limit unhealthy (saturated) fats, such as butter, palm oil, and coconut oil. And limit fats found in animal products, such as meat and dairy products made with whole milk. Try to eat red meat only a few times a month in very small amounts. Limit sweets and desserts to only a few times a week.  This includes sugar-sweetened drinks like soda. The Mediterranean diet may also include red wine with your meal--1 glass each day for women and up to 2 glasses a day for men. Tips for eating at home  Use herbs, spices, garlic, lemon zest, and citrus juice instead of salt to add flavor to foods. Add avocado slices to your sandwich instead of munoz. Have fish for lunch or dinner instead of red meat. Brush the fish with olive oil, and broil or grill it. Sprinkle your salad with seeds or nuts instead of cheese. Cook with olive or canola oil instead of butter or oils that are high in saturated fat. Switch from 2% milk or whole milk to 1% or fat-free milk. Dip raw vegetables in a vinaigrette dressing or hummus instead of dips made from mayonnaise or sour cream.  Have a piece of fruit for dessert instead of a piece of cake. Try baked apples, or have some dried fruit. Tips for eating out  Try broiled, grilled, baked, or poached fish instead of having it fried or breaded. Ask your  to have your meals prepared with olive oil instead of butter. Order dishes made with marinara sauce or sauces made from olive oil. Avoid sauces made from cream or mayonnaise. Choose whole-grain breads, whole wheat pasta and pizza crust, brown rice, beans, and lentils. Cut back on butter or margarine on bread. Instead, you can dip your bread in a small amount of olive oil. Ask for a side salad or grilled vegetables instead of french fries or chips. Where can you learn more? Go to http://www.woods.com/ and enter O407 to learn more about \"Learning About the Mediterranean Diet. \"  Current as of: May 9, 2022               Content Version: 13.5  © 7922-8723 [employer]wise, Incorporated. Care instructions adapted under license by [facility] ([facility]). If you have questions about a medical condition or this instruction, always ask your healthcare professional. [place] [address] any warranty or liability for your use of this information.

## 2023-01-04 NOTE — PROGRESS NOTES
[facility],[address], DO  [address], [address] [facility] [address]   No:  [phone]  Fax:  [phone]        Assessment/Plan:   Josse Xavier was seen today for cholesterol problem and ear fullness. Diagnoses and all orders for this visit:    Prediabetes  Improved between appointments. Reviewed below labs with patient.  -     CBC with Auto Differential; Future  -     Comprehensive Metabolic Panel; Future  -     Lipid Panel; Future  -     Hemoglobin A1C; Future    Elevated LDL cholesterol level  Worsened between appointments. 5.9% ASCVD risk. Provided her with information on Mediterranean style diet as she is agreeable to making some dietary changes. She is already made some. -     CBC with Auto Differential; Future  -     Comprehensive Metabolic Panel; Future  -     Lipid Panel; Future  -     Hemoglobin A1C; Future    Recurrent major depressive disorder, remission status unspecified ([facility] [address]  Following with psychiatry. Currently prescribed Lamictal, trazodone, venlafaxine, Lexapro    Severe obesity (BMI 35.0-39. 9) with comorbidity ([address]  Stable. Provided her with information on Mediterranean style diet. Her albumin is on the low end of normal.  Advised her Mediterranean-style diet will also help with this level. Discussed dietary changes that can help with prediabetes, cholesterol    Encounter for screening mammogram for breast cancer  -     [facility] Digital Screen Bilateral [[id number]]; Future    Bilateral impacted cerumen  Verbal consent obtained for in office cerumen removal.  Earwax MD applied to the ear canal to help soften the wax. In office irrigation completed. On reexamination there was resolution of the cerumen and improvement in her symptoms.  -     REMOVAL IMPACTED CERUMEN IRRIGATION/LVG UNILAT      Labs:  No results found for this visit on 01/04/23.     Nurse Only on 12/30/2022   Component Date Value Ref Range Status    Hemoglobin A1C 12/30/2022 5.9 (A)  4.8 - 5.6 % Final    eAG 12/30/2022 123  mg/dL Final    Comment: Reference Range  Normal: 4.8-5.6  Diabetic >=6.5%  Normal       <5.7%      Cholesterol, Total 12/30/2022 244 (A)  <200 MG/DL Final    Comment: Borderline High: 200-239 mg/dL  High: Greater than or equal to 240 mg/dL      Triglycerides 12/30/2022 119  35 - 150 MG/DL Final    Comment: Borderline High: 150-199 mg/dL, High: 200-499 mg/dL  Very High: Greater than or equal to 500 mg/dL      HDL 12/30/2022 68 (A)  40 - 60 MG/DL Final    LDL Calculated 12/30/2022 152.2 (A)  <100 MG/DL Final    Comment: Near Optimal: 100-129 mg/dL  Borderline High: 130-159, High: 160-189 mg/dL  Very High: Greater than or equal to 190 mg/dL      VLDL Cholesterol Calculated 12/30/2022 23.8 (A)  6.0 - 23.0 MG/DL Final    Chol/HDL Ratio 12/30/2022 3.6    Final    Sodium 12/30/2022 140  133 - 143 mmol/L Final    Potassium 12/30/2022 4.6  3.5 - 5.1 mmol/L Final    Chloride 12/30/2022 110  101 - 110 mmol/L Final    CO2 12/30/2022 28  21 - 32 mmol/L Final    Anion Gap 12/30/2022 2  2 - 11 mmol/L Final    Glucose 12/30/2022 106 (A)  65 - 100 mg/dL Final    BUN 12/30/2022 21  8 - 23 MG/DL Final    Creatinine 12/30/2022 0.90  0.6 - 1.0 MG/DL Final    Est, Glom Filt Rate 12/30/2022 >60  >60 ml/min/1.73m2 Final    Comment:   Pediatric calculator link: [email or web address] org/professionals/kdoqi/gfr_calculatorped    These results are not intended for use in patients <25years of age. eGFR results are calculated without a race factor using  the 2021 CKD-EPI equation. Careful clinical correlation is recommended, particularly when comparing to results calculated using previous equations. The CKD-EPI equation is less accurate in patients with extremes of muscle mass, extra-renal metabolism of creatinine, excessive creatine ingestion, or following therapy that affects renal tubular secretion.       Calcium 12/30/2022 9.1  8.3 - 10.4 MG/DL Final    Total Bilirubin 12/30/2022 0.4  0.2 - 1.1 MG/DL Final ALT 12/30/2022 22  12 - 65 U/L Final    AST 12/30/2022 11 (A)  15 - 37 U/L Final    Alk Phosphatase 12/30/2022 103  50 - 136 U/L Final    Total Protein 12/30/2022 5.9 (A)  6.3 - 8.2 g/dL Final    Albumin 12/30/2022 3.2  3.2 - 4.6 g/dL Final    Globulin 12/30/2022 2.7 (A)  2.8 - 4.5 g/dL Final    Albumin/Globulin Ratio 12/30/2022 1.2  0.4 - 1.6   Final    WBC 12/30/2022 5.2  4.3 - 11.1 K/uL Final    RBC 12/30/2022 4.39  4.05 - 5.2 M/uL Final    Hemoglobin 12/30/2022 13.3  11.7 - 15.4 g/dL Final    Hematocrit 12/30/2022 42.7  35.8 - 46.3 % Final    MCV 12/30/2022 97.3  82 - 102 FL Final    MCH 12/30/2022 30.3  26.1 - 32.9 PG Final    MCHC 12/30/2022 31.1 (A)  31.4 - 35.0 g/dL Final    RDW 12/30/2022 14.2  11.9 - 14.6 % Final    Platelets 97/56/3493 236  150 - 450 K/uL Final    MPV 12/30/2022 10.5  9.4 - 12.3 FL Final    nRBC 12/30/2022 0.00  0.0 - 0.2 K/uL Final    **Note: Absolute NRBC parameter is now reported with Hemogram**    Differential Type 12/30/2022 AUTOMATED    Final    Seg Neutrophils 12/30/2022 62  43 - 78 % Final    Lymphocytes 12/30/2022 27  13 - 44 % Final    Monocytes 12/30/2022 7  4.0 - 12.0 % Final    Eosinophils % 12/30/2022 3  0.5 - 7.8 % Final    Basophils 12/30/2022 1  0.0 - 2.0 % Final    Immature Granulocytes 12/30/2022 0  0.0 - 5.0 % Final    Segs Absolute 12/30/2022 3.2  1.7 - 8.2 K/UL Final    Absolute Lymph # 12/30/2022 1.4  0.5 - 4.6 K/UL Final    Absolute Mono # 12/30/2022 0.4  0.1 - 1.3 K/UL Final    Absolute Eos # 12/30/2022 0.2  0.0 - 0.8 K/UL Final    Basophils Absolute 12/30/2022 0.0  0.0 - 0.2 K/UL Final    Absolute Immature Granulocyte 12/30/2022 0.0  0.0 - 0.5 K/UL Final    Hepatitis C Ab 12/30/2022 NONREACTIVE  NONREACTIVE   Final           Rubens Loo is a 79 y.o. female who is seen for evaluation of   Chief Complaint   Patient presents with    Cholesterol Problem    Ear Fullness     Left ear fullness, pt stated feels like wax build up.        HPI:   She has feeling of fullness in the left ear. She tried some over-the-counter warm oil to see if this would help and it did not. She also had labs prior to her appointment today. She is been eating more broccoli, more spinach. She avoids bread. She established with psychiatry and she does feel that her psychiatrist and her are working towards helping with her mental health. She gets along with her psychiatrist well. She be agreeable to scheduling her mammogram.  She is not having chest pain or shortness of breath. She feels a little unsteady but she thinks this may be related to the earwax. Review of Systems:  Review of Systems   Constitutional:  Negative for chills, fever and unexpected weight change. HENT:  Positive for hearing loss. Respiratory:  Negative for cough and shortness of breath. Cardiovascular:  Negative for chest pain and leg swelling. Gastrointestinal:  Negative for abdominal pain, blood in stool, nausea and vomiting.        History:  Past Medical History:   Diagnosis Date    Anxiety     Asthma     Depression     Elevated LDL cholesterol level     Essential tremor     Melanoma in situ (HCC)     Osteoporosis     Prediabetes     RLS (restless legs syndrome)        Past Surgical History:   Procedure Laterality Date     SECTION      GASTRIC BYPASS SURGERY      MALIGNANT SKIN LESION EXCISION      TONSILLECTOMY      TOTAL KNEE ARTHROPLASTY Left        Current Outpatient Medications   Medication Sig Dispense Refill    LORazepam (ATIVAN) 1 MG tablet TAKE 1 TABLET BY MOUTH AT BEDTIME      lamoTRIgine (LAMICTAL) 150 MG tablet TAKE 1 TABLET BY MOUTH AT BEDTIME      topiramate (TOPAMAX) 50 MG tablet Take 1 tablet by mouth in the morning, at noon, and at bedtime 90 tablet 5    venlafaxine (EFFEXOR XR) 75 MG extended release capsule TAKE 3 CAPSULES BY MOUTH EVERY  capsule 0    vitamin B-12 (CYANOCOBALAMIN) 1000 MCG tablet Take 3,000 mcg by mouth daily       Cholecalciferol (VITAMIN D3) 1.25 MG (71636 UT) CAPS Take by mouth      vitamin C (ASCORBIC ACID) 500 MG tablet Take 500 mg by mouth daily      albuterol sulfate HFA (VENTOLIN HFA) 108 (90 Base) MCG/ACT inhaler Inhale 2 puffs into the lungs 4 times daily as needed for Wheezing or Shortness of Breath 18 g 5    escitalopram (LEXAPRO) 20 MG tablet Take 1 tablet by mouth daily 30 tablet 2    traZODone (DESYREL) 50 MG tablet TAKE 1 TABLET BY MOUTH NIGHTLY. INDICATIONS: INSOMNIA ASSOCIATED WITH DEPRESSION 30 tablet 2    fluticasone propionate (FLOVENT DISKUS) 100 MCG/BLIST AEPB inhaler 1 puff twice daily. Brush teeth after each use 60 each 3    MAGNESIUM PO Take 200 mg by mouth       ZINC PO Take by mouth      primidone (MYSOLINE) 50 MG tablet Take 3 tablets by mouth 2 times daily       No current facility-administered medications for this visit. There is no immunization history on file for this patient. Vitals:    Vitals:    01/04/23 1117   BP: 124/72   Site: Left Upper Arm   Position: Sitting   Pulse: 73   Resp: 16   SpO2: 98%   Weight: 250 lb 6.4 oz (113.6 kg)   Height: 5' 7\" (1.702 m)          Physical Exam:  Physical Exam  Vitals reviewed. Constitutional:       Appearance: Normal appearance. HENT:      Head: Normocephalic and atraumatic. Right Ear: There is impacted cerumen. Left Ear: There is impacted cerumen. Cardiovascular:      Rate and Rhythm: Normal rate and regular rhythm. Heart sounds: No murmur heard. Pulmonary:      Effort: Pulmonary effort is normal. No respiratory distress. Breath sounds: Normal breath sounds. No wheezing or rhonchi. Abdominal:      General: There is no distension. Palpations: There is no mass. Tenderness: There is no abdominal tenderness. There is no right CVA tenderness, left CVA tenderness, guarding or rebound. Hernia: No hernia is present. Musculoskeletal:      Cervical back: Neck supple. Right lower leg: No edema. Left lower leg: No edema. Lymphadenopathy:      Cervical: No cervical adenopathy. Skin:     General: Skin is warm and dry. Neurological:      Mental Status: She is alert. Psychiatric:         Mood and Affect: Mood normal.         Behavior: Behavior normal.           Juliet Winters,     This note was generated using Dragon voice recognition software.   There may be medical errors due to computer generated translation

## 2023-01-25 ENCOUNTER — HOSPITAL ENCOUNTER (OUTPATIENT)
Dept: MAMMOGRAPHY | Age: 68
Discharge: HOME | End: 2023-01-28
Payer: MEDICARE

## 2023-01-25 DIAGNOSIS — Z12.31 ENCOUNTER FOR SCREENING MAMMOGRAM FOR BREAST CANCER: ICD-10-CM

## 2023-01-25 PROCEDURE — 77067 SCR MAMMO BI INCL CAD: CPT

## 2023-01-30 ENCOUNTER — PATIENT MESSAGE (OUTPATIENT)
Dept: NEUROLOGY | Age: 68
End: 2023-01-30

## 2023-01-31 NOTE — TELEPHONE ENCOUNTER
Em Hinkle MA 1/31/2023 9:16 AM EST      ----- Message -----  From: Osito Haynes MA  Sent: 1/30/2023 3:46 PM EST  To: Em Hinkle MA  Subject: FW: Essential Tremors       ----- Message -----  From: Kenya Castro  Sent: 1/30/2023 3:41 PM EST  To: , *  Subject: Essential Tremors     I am not satisfied with the effects the primidone and topiramate are having on my shakes. Most days the tremors vibrate throughout my body almost constantly. I am finding minimal to no relief from the shakes during the day. Please help my find another solution. ..     Kenya Castro

## 2023-02-22 ENCOUNTER — OFFICE VISIT (OUTPATIENT)
Dept: NEUROLOGY | Age: 68
End: 2023-02-22

## 2023-02-22 VITALS
HEART RATE: 78 BPM | HEIGHT: 67 IN | BODY MASS INDEX: 37.98 KG/M2 | DIASTOLIC BLOOD PRESSURE: 82 MMHG | WEIGHT: 242 LBS | SYSTOLIC BLOOD PRESSURE: 124 MMHG | OXYGEN SATURATION: 97 %

## 2023-02-22 DIAGNOSIS — F41.9 ANXIETY: ICD-10-CM

## 2023-02-22 DIAGNOSIS — G25.0 ESSENTIAL TREMOR: Primary | ICD-10-CM

## 2023-02-22 RX ORDER — LORAZEPAM 0.5 MG/1
TABLET ORAL
COMMUNITY
Start: 2022-12-12 | End: 2023-02-22

## 2023-02-22 RX ORDER — PROPRANOLOL HYDROCHLORIDE 20 MG/1
20 TABLET ORAL 2 TIMES DAILY
Qty: 90 TABLET | Refills: 3 | Status: SHIPPED | OUTPATIENT
Start: 2023-02-22

## 2023-02-22 ASSESSMENT — ENCOUNTER SYMPTOMS
ABDOMINAL PAIN: 0
COUGH: 0

## 2023-02-22 NOTE — PROGRESS NOTES
Baruch Crigler [address], [address] [facility], [address] [place] [address]  Phone: [phone] Fax [phone]  JETHRO Boswell        Patient: Beatriz Sheppard  Provider: JETHRO Boswell      CC:   Chief Complaint   Patient presents with    Follow-up     Essential tremor     Referring Provider:    History of Present Illness:     Beatriz Sheppard is a 79 y.o. RH female who presents for follow-up of tremor. She is accompanied for today's visit. She presents for further evaluation of tremor of the hands which has been present over the last 5 years. Tremor is more noticeable with action such as carrying items/objects and writing. She does; however, report that she is more easily able to manage eating with utensils. Denies any tremor of the head or voice. She states that she is experiencing intermittent tremors in her bilateral lower extremities. No patterns noted. Patient states that she experiencing only mild to moderate relief for up to 2 hours after taking her primidone. She did start taking the topiramate after her last OV Nov 2022, but has not noticed an improvement. Patient reports that her anxiety and frustration has increased. Inner tremor sensation present 98%  of the time, but she is able to sleep. Only family history of tremor to be noted is a paternal uncle with history of tremors. No other family history of Parkinson's disease. Current medications include:  Primidone 150 mg 4 times a day (8a, 12p, 4p, 8p)  Propranolol 20mg BID     Previous medication trials include:   Topiramate- didn't feel it helped      Patient presents today for follow-up. She has been taking primidone and topiramate for essential tremor management, but reports minimal relief of tremor and increased anxiety and depression as a result. Pharmacological and non-pharmacological treatment options were reviewed.  Despite having tried propranolol in the past, she would like to retrial this medication and taper off of topiramate. Importance of not stopping this medication abruptly was discussed and she verbalized understanding. The [place] Trio device and DBS were also discussed. Patient is not interested in DBS. Jinger Rakers may be a suitable option in the future if additional relief is needed. Review of Systems:   Review of Systems   Constitutional:  Negative for fever. HENT:  Negative for hearing loss. Eyes:  Negative for visual disturbance. Respiratory:  Negative for cough. Cardiovascular:  Negative for chest pain. Gastrointestinal:  Negative for abdominal pain. Genitourinary:  Negative for dysuria. Musculoskeletal:  Negative for gait problem. Skin:  Negative for rash. Allergic/Immunologic: Negative for immunocompromised state. Neurological:  Positive for tremors. Psychiatric/Behavioral:  Negative for confusion and sleep disturbance. The patient is nervous/anxious. Lab/Imaging Review:   I REVIEWED PERTINENT LABS, IMAGES, AND REPORTS WITH THE PATIENT PERSONALLY, DIRECTLY AND FULLY. THE MOST PERTINENT FINDINGS ARE NOTED BELOW:    N/A    Past Medical History:     Past medical history, surgical history, social history, family history, medications, and allergies were reviewed and updated as appropriate.      PAST MEDICAL HISTORY:  Past Medical History:   Diagnosis Date    Anxiety     Asthma     Depression     Elevated LDL cholesterol level     Essential tremor     Melanoma in situ (HCC)     Osteoporosis     Prediabetes     RLS (restless legs syndrome)      PAST SURGICAL HISTORY:   Past Surgical History:   Procedure Laterality Date     SECTION      GASTRIC BYPASS SURGERY      MALIGNANT SKIN LESION EXCISION      TONSILLECTOMY      TOTAL KNEE ARTHROPLASTY Left      FAMILY HISTORY:  Family History   Problem Relation Age of Onset    Cancer Mother     Lung Cancer Mother     Cancer Sister     Melanoma Sister       SOCIAL HISTORY:  Social History     Socioeconomic History    Marital status:      Spouse name: Debra Martínez    Number of children: 3    Years of education: None    Highest education level: None   Occupational History    Occupation: Previously worked as a teacher   Tobacco Use    Smoking status: Never    Smokeless tobacco: Never   Vaping Use    Vaping Use: Never used   Substance and Sexual Activity    Alcohol use: Never    Drug use: Never    Sexual activity: Not Currently     Partners: Male   Social History Narrative    Patient has 2 dogs that sleep in the same room with her but not in the bed. Patient drinks soda 2 times per week     Social Determinants of Health     Physical Activity: Insufficiently Active    Days of Exercise per Week: 1 day    Minutes of Exercise per Session: 30 min       Medications/Allergies:     MEDICATIONS:   Outpatient Encounter Medications as of 2/22/2023   Medication Sig Dispense Refill    propranolol (INDERAL) 20 MG tablet Take 1 tablet by mouth 2 times daily 90 tablet 3    LORazepam (ATIVAN) 1 MG tablet TAKE 1 TABLET BY MOUTH AT BEDTIME      lamoTRIgine (LAMICTAL) 150 MG tablet TAKE 1 TABLET BY MOUTH AT BEDTIME      venlafaxine (EFFEXOR XR) 75 MG extended release capsule TAKE 3 CAPSULES BY MOUTH EVERY  capsule 0    vitamin B-12 (CYANOCOBALAMIN) 1000 MCG tablet Take 3,000 mcg by mouth daily       Cholecalciferol (VITAMIN D3) 1.25 MG (59659 UT) CAPS Take by mouth      vitamin C (ASCORBIC ACID) 500 MG tablet Take 500 mg by mouth daily      albuterol sulfate HFA (VENTOLIN HFA) 108 (90 Base) MCG/ACT inhaler Inhale 2 puffs into the lungs 4 times daily as needed for Wheezing or Shortness of Breath 18 g 5    escitalopram (LEXAPRO) 20 MG tablet Take 1 tablet by mouth daily 30 tablet 2    fluticasone propionate (FLOVENT DISKUS) 100 MCG/BLIST AEPB inhaler 1 puff twice daily.   Brush teeth after each use 60 each 3    MAGNESIUM PO Take 200 mg by mouth       ZINC PO Take by mouth      primidone (MYSOLINE) 50 MG tablet Take 3 tablets by mouth 2 times daily [DISCONTINUED] LORazepam (ATIVAN) 0.5 MG tablet TAKE 1 TAB BY MOUTH AT BEDTIME FOR ANXIETY      [DISCONTINUED] topiramate (TOPAMAX) 50 MG tablet Take 1 tablet by mouth in the morning, at noon, and at bedtime 90 tablet 5    [DISCONTINUED] traZODone (DESYREL) 50 MG tablet TAKE 1 TABLET BY MOUTH NIGHTLY. INDICATIONS: INSOMNIA ASSOCIATED WITH DEPRESSION (Patient not taking: Reported on 2/22/2023) 30 tablet 2     No facility-administered encounter medications on file as of 2/22/2023. ALLERGIES:  Allergies   Allergen Reactions    Bupropion Other (See Comments)     headache    Penicillins Rash       Physical Exam:     /82   Pulse 78   Ht 5' 7\" (1.702 m)   Wt 242 lb (109.8 kg)   SpO2 97%   BMI 37.90 kg/m²     General Exam:    General: Well developed, well nourished, in no apparent distress. HEENT: Normocephalic, atraumatic. Sclera anicteric. Oropharynx clear. Neck: Supple without masses  Cardiovascular: Regular rate and rhythm. No carotid bruits. Lungs: Non-labored breathing. Abdomen: Soft, nontender, nondistended. Extremities: Peripheral pulses intact. No edema and no rashes. Neurological Exam:     MS/Language/Speech:  Alert. Oriented x 3. Language fluent. Speech normal.     Cranial Nerves: PERRL. Eye movements full with normal pursuits. No nystagmus. Face was symmetric with good activation and normal sensation. Tongue and palate were midline. Shoulder shrug symmetric. Motor: Strength was full in all proximal and distal muscle groups. Tone was normal.     Abnormal Movements: There is a mild postural and action tremor of the right hand and a milder tremor of the left hand with posture. Finger-nose-finger shows only slight tremor. Handwriting shows only mild evidence of tremor. Sensory: Normal to light touch throughout. Cerebellar: No ataxia or dysmetria with finger-nose-finger bilaterally.        Gait: She is slow to rise from a seated position and walks with a mildly slowed but nonparkinsonian gait. Assessment and Plan:     Adalgisa Christiansen is a 79 y.o. female who presents with the following issues:     Jenniffer Denver was seen today for follow-up. Diagnoses and all orders for this visit:    Essential tremor  -     propranolol (INDERAL) 20 MG tablet; Take 1 tablet by mouth 2 times daily    Anxiety  -     propranolol (INDERAL) 20 MG tablet; Take 1 tablet by mouth 2 times daily    Patient presents for follow-up and continued management of a tremor of the hands which clinically has been most consistent with benign essential tremor. There is no concern for parkinsonism. For now we will have her continue primidone 150 mg 4 times a day. She will begin to taper off of the topiramate as discussed during her appointment. She will start propanolol 20 mg BID. Goal of propranolol is to obtain better symptom management of ET as well as address her increased anxiety. Follow-up in 3 months. Signature: JETHRO Nelson      Date:  2/22/2023    [facility]   [facility] [address] [place] [address]  Ph: [phone]  Fax: [phone]         I have personally interviewed and examined Mrs. Adalgisa Christiansen and I have personally reviewed all relevant records including labs and imaging as noted above. I have written all aspects of this note. More than 50% of this time was used for counseling regarding my diagnosis, prognosis, and plans for management. Total visit time: 60 minutes.

## 2023-04-24 ENCOUNTER — OFFICE VISIT (OUTPATIENT)
Dept: FAMILY MEDICINE CLINIC | Facility: CLINIC | Age: 68
End: 2023-04-24
Payer: MEDICARE

## 2023-04-24 VITALS
DIASTOLIC BLOOD PRESSURE: 76 MMHG | RESPIRATION RATE: 16 BRPM | HEIGHT: 67 IN | BODY MASS INDEX: 38.96 KG/M2 | OXYGEN SATURATION: 96 % | SYSTOLIC BLOOD PRESSURE: 126 MMHG | HEART RATE: 72 BPM | WEIGHT: 248.2 LBS

## 2023-04-24 DIAGNOSIS — M70.62 TROCHANTERIC BURSITIS OF BOTH HIPS: ICD-10-CM

## 2023-04-24 DIAGNOSIS — M79.18 BILATERAL BUTTOCK PAIN: ICD-10-CM

## 2023-04-24 DIAGNOSIS — J45.909 UNCOMPLICATED ASTHMA, UNSPECIFIED ASTHMA SEVERITY, UNSPECIFIED WHETHER PERSISTENT: ICD-10-CM

## 2023-04-24 DIAGNOSIS — M70.61 TROCHANTERIC BURSITIS OF BOTH HIPS: ICD-10-CM

## 2023-04-24 DIAGNOSIS — M75.22 BICEPS TENDONITIS ON LEFT: Primary | ICD-10-CM

## 2023-04-24 PROCEDURE — G8427 DOCREV CUR MEDS BY ELIG CLIN: HCPCS | Performed by: FAMILY MEDICINE

## 2023-04-24 PROCEDURE — 3017F COLORECTAL CA SCREEN DOC REV: CPT | Performed by: FAMILY MEDICINE

## 2023-04-24 PROCEDURE — G8417 CALC BMI ABV UP PARAM F/U: HCPCS | Performed by: FAMILY MEDICINE

## 2023-04-24 PROCEDURE — 99214 OFFICE O/P EST MOD 30 MIN: CPT | Performed by: FAMILY MEDICINE

## 2023-04-24 PROCEDURE — 1036F TOBACCO NON-USER: CPT | Performed by: FAMILY MEDICINE

## 2023-04-24 PROCEDURE — 1123F ACP DISCUSS/DSCN MKR DOCD: CPT | Performed by: FAMILY MEDICINE

## 2023-04-24 PROCEDURE — 1090F PRES/ABSN URINE INCON ASSESS: CPT | Performed by: FAMILY MEDICINE

## 2023-04-24 PROCEDURE — G8400 PT W/DXA NO RESULTS DOC: HCPCS | Performed by: FAMILY MEDICINE

## 2023-04-24 RX ORDER — ALBUTEROL SULFATE 90 UG/1
2 AEROSOL, METERED RESPIRATORY (INHALATION) 4 TIMES DAILY PRN
Qty: 18 G | Refills: 5 | Status: SHIPPED | OUTPATIENT
Start: 2023-04-24

## 2023-04-24 RX ORDER — QUETIAPINE FUMARATE 25 MG/1
25 TABLET, FILM COATED ORAL DAILY PRN
COMMUNITY
Start: 2023-04-20

## 2023-04-24 RX ORDER — DESVENLAFAXINE SUCCINATE 50 MG/1
50 TABLET, EXTENDED RELEASE ORAL EVERY MORNING
COMMUNITY
Start: 2023-03-09

## 2023-04-24 RX ORDER — FLUTICASONE PROPIONATE AND SALMETEROL 100; 50 UG/1; UG/1
1 POWDER RESPIRATORY (INHALATION) EVERY 12 HOURS
COMMUNITY

## 2023-04-24 SDOH — ECONOMIC STABILITY: INCOME INSECURITY: HOW HARD IS IT FOR YOU TO PAY FOR THE VERY BASICS LIKE FOOD, HOUSING, MEDICAL CARE, AND HEATING?: NOT HARD AT ALL

## 2023-04-24 SDOH — ECONOMIC STABILITY: FOOD INSECURITY: WITHIN THE PAST 12 MONTHS, YOU WORRIED THAT YOUR FOOD WOULD RUN OUT BEFORE YOU GOT MONEY TO BUY MORE.: NEVER TRUE

## 2023-04-24 SDOH — ECONOMIC STABILITY: HOUSING INSECURITY
IN THE LAST 12 MONTHS, WAS THERE A TIME WHEN YOU DID NOT HAVE A STEADY PLACE TO SLEEP OR SLEPT IN A SHELTER (INCLUDING NOW)?: NO

## 2023-04-24 SDOH — ECONOMIC STABILITY: FOOD INSECURITY: WITHIN THE PAST 12 MONTHS, THE FOOD YOU BOUGHT JUST DIDN'T LAST AND YOU DIDN'T HAVE MONEY TO GET MORE.: NEVER TRUE

## 2023-04-24 ASSESSMENT — ENCOUNTER SYMPTOMS: BACK PAIN: 1

## 2023-04-24 NOTE — PROGRESS NOTES
[address] [facility],[address], DO  [address], [address] [facility] [address]  Ph No:  [phone]  Fax:  [phone]        Assessment/Plan:   India Santos was seen today for fall and medication refill. Diagnoses and all orders for this visit:    Biceps tendonitis on left  New problem. Has been present for more than a year. Provided her with home rehab exercises and prescribing Voltaren gel. If pain not improving in the next 2-4 weeks recommend she contact the office for referral to PT  -     diclofenac sodium (VOLTAREN) 1 % GEL; Apply 4 g topically 4 times daily as needed for Pain    Bilateral buttock pain  Patient with pain to palpation of the piriformis muscle. While she does have history of osteoporosis, and there was an injury, in light of her not having radicular symptoms and knowing that a compression fracture may not benefit from intervention at this time, she and I both discussed deferring x-rays for now and she is going to try some home rehab exercises for piriformis syndrome. Continue Tylenol as needed. Continue heat which has helped. Advised patient I would want to see her if she develops neurologic symptoms such as radicular numbness    Trochanteric bursitis of both hips  Continue Tylenol, prescribing diclofenac gel. Provided her with home rehab exercises. She is to contact the office if there is no improvement in the next 2-4 weeks for referral to physical therapy. -     diclofenac sodium (VOLTAREN) 1 % GEL; Apply 4 g topically 4 times daily as needed for Pain    Uncomplicated asthma, unspecified asthma severity, unspecified whether persistent  Continue daily Flovent and albuterol as needed  -     albuterol sulfate HFA (VENTOLIN HFA) 108 (90 Base) MCG/ACT inhaler;  Inhale 2 puffs into the lungs 4 times daily as needed for Wheezing or Shortness of Breath                Sue Pierson is a 79 y.o. female who is seen for evaluation of   Chief Complaint   Patient presents

## 2023-05-12 DIAGNOSIS — G25.0 ESSENTIAL TREMOR: Primary | ICD-10-CM

## 2023-05-12 DIAGNOSIS — F41.9 ANXIETY: ICD-10-CM

## 2023-05-13 RX ORDER — PRIMIDONE 50 MG/1
150 TABLET ORAL 2 TIMES DAILY
Qty: 180 TABLET | Refills: 2 | Status: SHIPPED | OUTPATIENT
Start: 2023-05-13

## 2023-05-15 DIAGNOSIS — G25.0 ESSENTIAL TREMOR: ICD-10-CM

## 2023-05-15 DIAGNOSIS — F41.9 ANXIETY: ICD-10-CM

## 2023-05-15 RX ORDER — PRIMIDONE 50 MG/1
50 TABLET ORAL 4 TIMES DAILY
Qty: 120 TABLET | Refills: 5 | Status: SHIPPED | OUTPATIENT
Start: 2023-05-15

## 2023-05-16 ENCOUNTER — OFFICE VISIT (OUTPATIENT)
Dept: FAMILY MEDICINE CLINIC | Facility: CLINIC | Age: 68
End: 2023-05-16
Payer: MEDICARE

## 2023-05-16 VITALS
HEIGHT: 67 IN | HEART RATE: 74 BPM | BODY MASS INDEX: 39.11 KG/M2 | RESPIRATION RATE: 16 BRPM | DIASTOLIC BLOOD PRESSURE: 76 MMHG | SYSTOLIC BLOOD PRESSURE: 124 MMHG | OXYGEN SATURATION: 97 % | WEIGHT: 249.2 LBS

## 2023-05-16 DIAGNOSIS — M77.01 MEDIAL EPICONDYLITIS OF RIGHT ELBOW: ICD-10-CM

## 2023-05-16 DIAGNOSIS — M70.62 TROCHANTERIC BURSITIS OF BOTH HIPS: ICD-10-CM

## 2023-05-16 DIAGNOSIS — M77.11 LATERAL EPICONDYLITIS OF RIGHT ELBOW: Primary | ICD-10-CM

## 2023-05-16 DIAGNOSIS — M75.22 BICEPS TENDONITIS ON LEFT: ICD-10-CM

## 2023-05-16 DIAGNOSIS — M79.18 BILATERAL BUTTOCK PAIN: ICD-10-CM

## 2023-05-16 DIAGNOSIS — M70.61 TROCHANTERIC BURSITIS OF BOTH HIPS: ICD-10-CM

## 2023-05-16 PROCEDURE — 99213 OFFICE O/P EST LOW 20 MIN: CPT | Performed by: FAMILY MEDICINE

## 2023-05-16 PROCEDURE — G8417 CALC BMI ABV UP PARAM F/U: HCPCS | Performed by: FAMILY MEDICINE

## 2023-05-16 PROCEDURE — 1123F ACP DISCUSS/DSCN MKR DOCD: CPT | Performed by: FAMILY MEDICINE

## 2023-05-16 PROCEDURE — G8427 DOCREV CUR MEDS BY ELIG CLIN: HCPCS | Performed by: FAMILY MEDICINE

## 2023-05-16 PROCEDURE — 1090F PRES/ABSN URINE INCON ASSESS: CPT | Performed by: FAMILY MEDICINE

## 2023-05-16 PROCEDURE — 3017F COLORECTAL CA SCREEN DOC REV: CPT | Performed by: FAMILY MEDICINE

## 2023-05-16 PROCEDURE — 1036F TOBACCO NON-USER: CPT | Performed by: FAMILY MEDICINE

## 2023-05-16 PROCEDURE — G8400 PT W/DXA NO RESULTS DOC: HCPCS | Performed by: FAMILY MEDICINE

## 2023-05-16 ASSESSMENT — ENCOUNTER SYMPTOMS
SHORTNESS OF BREATH: 0
COUGH: 0

## 2023-05-16 NOTE — PROGRESS NOTES
unexpected weight change. Respiratory:  Negative for cough and shortness of breath. Cardiovascular:  Negative for chest pain and leg swelling. Musculoskeletal:  Positive for arthralgias and myalgias. Neurological:  Positive for numbness. History:  Past Medical History:   Diagnosis Date    Anxiety     Asthma     Depression     Elevated LDL cholesterol level     Essential tremor     Melanoma in situ (HCC)     Osteoporosis     Prediabetes     RLS (restless legs syndrome)        Past Surgical History:   Procedure Laterality Date     SECTION      GASTRIC BYPASS SURGERY      MALIGNANT SKIN LESION EXCISION      TONSILLECTOMY      TOTAL KNEE ARTHROPLASTY Left        Current Outpatient Medications   Medication Sig Dispense Refill    primidone (MYSOLINE) 50 MG tablet Take 1 tablet by mouth 4 times daily 120 tablet 5    desvenlafaxine succinate (PRISTIQ) 50 MG TB24 extended release tablet Take 1 tablet by mouth every morning      QUEtiapine (SEROQUEL) 25 MG tablet Take 1 tablet by mouth daily as needed      fluticasone-salmeterol (ADVAIR) 100-50 MCG/ACT AEPB diskus inhaler Inhale 1 puff into the lungs in the morning and 1 puff in the evening.       albuterol sulfate HFA (VENTOLIN HFA) 108 (90 Base) MCG/ACT inhaler Inhale 2 puffs into the lungs 4 times daily as needed for Wheezing or Shortness of Breath 18 g 5    diclofenac sodium (VOLTAREN) 1 % GEL Apply 4 g topically 4 times daily as needed for Pain 1500 g 1    propranolol (INDERAL) 20 MG tablet Take 1 tablet by mouth 2 times daily 90 tablet 3    LORazepam (ATIVAN) 1 MG tablet TAKE 1 TABLET BY MOUTH AT BEDTIME      lamoTRIgine (LAMICTAL) 150 MG tablet TAKE 1 TABLET BY MOUTH AT BEDTIME      venlafaxine (EFFEXOR XR) 75 MG extended release capsule TAKE 3 CAPSULES BY MOUTH EVERY  capsule 0    vitamin B-12 (CYANOCOBALAMIN) 1000 MCG tablet Take 3 tablets by mouth daily      Cholecalciferol (VITAMIN D3) 1.25 MG (58668 UT) CAPS Take by mouth      vitamin

## 2023-05-31 ENCOUNTER — OFFICE VISIT (OUTPATIENT)
Dept: NEUROLOGY | Age: 68
End: 2023-05-31
Payer: MEDICARE

## 2023-05-31 VITALS
OXYGEN SATURATION: 95 % | BODY MASS INDEX: 39 KG/M2 | WEIGHT: 249 LBS | SYSTOLIC BLOOD PRESSURE: 136 MMHG | HEART RATE: 70 BPM | DIASTOLIC BLOOD PRESSURE: 82 MMHG

## 2023-05-31 DIAGNOSIS — G25.0 ESSENTIAL TREMOR: ICD-10-CM

## 2023-05-31 PROCEDURE — 3017F COLORECTAL CA SCREEN DOC REV: CPT | Performed by: PSYCHIATRY & NEUROLOGY

## 2023-05-31 PROCEDURE — G8427 DOCREV CUR MEDS BY ELIG CLIN: HCPCS | Performed by: PSYCHIATRY & NEUROLOGY

## 2023-05-31 PROCEDURE — 99213 OFFICE O/P EST LOW 20 MIN: CPT | Performed by: PSYCHIATRY & NEUROLOGY

## 2023-05-31 PROCEDURE — 1090F PRES/ABSN URINE INCON ASSESS: CPT | Performed by: PSYCHIATRY & NEUROLOGY

## 2023-05-31 PROCEDURE — G8417 CALC BMI ABV UP PARAM F/U: HCPCS | Performed by: PSYCHIATRY & NEUROLOGY

## 2023-05-31 PROCEDURE — 1123F ACP DISCUSS/DSCN MKR DOCD: CPT | Performed by: PSYCHIATRY & NEUROLOGY

## 2023-05-31 PROCEDURE — 1036F TOBACCO NON-USER: CPT | Performed by: PSYCHIATRY & NEUROLOGY

## 2023-05-31 PROCEDURE — G8400 PT W/DXA NO RESULTS DOC: HCPCS | Performed by: PSYCHIATRY & NEUROLOGY

## 2023-05-31 RX ORDER — PRIMIDONE 50 MG/1
150 TABLET ORAL 4 TIMES DAILY
Qty: 1080 TABLET | Refills: 3 | Status: SHIPPED | OUTPATIENT
Start: 2023-05-31

## 2023-05-31 ASSESSMENT — ENCOUNTER SYMPTOMS
ABDOMINAL PAIN: 0
COUGH: 0

## 2023-05-31 NOTE — PROGRESS NOTES
Suzanne Mai  [address], [address] [facility], [address] [place] [address]  Phone: [phone] Fax [phone]  Geri Salazar MD      Patient: Adelia Amaya  Provider: Geri Salazar MD    CC:   Chief Complaint   Patient presents with    Follow-up     Referring Provider:    History of Present Illness:     Adelia Amaya is a 79 y.o. RH female who presents for follow-up of tremor. She is accompanied for today's visit. She was last seen by NP in February 2023. She presents for further evaluation of tremor of the hands which has been present over the last 5 years. Tremor is more noticeable with action and may affect tasks requiring dexterity such as eating with utensils and holding drink steady. Denies any tremor of the head or voice. Only family history of tremor to be noted is a paternal uncle with history of tremors. No other family history of Parkinson's disease. Current medications include:  Primidone 150 mg 4 times a day  Propranolol 20 mg twice a day (patient reports taking it 3 times a day)     Previous medication trials include: Topiramate     Patient presents today for follow-up. Taking both primidone and propranolol for her tremor as noted above. Tolerating well with no adverse effects. The combination appears to have worked very well for her tremor. She is experiencing very little breakthrough tremor at the current time. Topiramate has been weaned and discontinued. In the past, tremor has been more problematic particularly in her dominant right hand when performing tasks requiring dexterity such as holding a drink steady or using utensils. But she seems to be doing well at the current time. Review of Systems:   Review of Systems   Constitutional:  Negative for fever. HENT:  Negative for hearing loss. Eyes:  Negative for visual disturbance. Respiratory:  Negative for cough. Cardiovascular:  Negative for chest pain.    Gastrointestinal:  Negative for abdominal

## 2023-06-06 ENCOUNTER — TELEPHONE (OUTPATIENT)
Dept: FAMILY MEDICINE CLINIC | Facility: CLINIC | Age: 68
End: 2023-06-06

## 2023-06-06 NOTE — TELEPHONE ENCOUNTER
----- Message from Lam Babin sent at 6/6/2023  8:10 AM EDT -----  Subject: Message to Provider    QUESTIONS  Information for Provider? PT called in her right eye started itching   yesterday 6/5/23, and today she woke up and it is puffy and crusty, and   her left eye is looking a little red she said. Pt is thinking it is pink   eye and wanted a message sent to get medication or see if she has to have   appt.   ---------------------------------------------------------------------------  --------------  Momo HIGHTOWER  [phone]; OK to leave message on voicemail  ---------------------------------------------------------------------------  --------------  SCRIPT ANSWERS  Relationship to Patient?  Self

## 2023-06-29 ENCOUNTER — NURSE ONLY (OUTPATIENT)
Dept: FAMILY MEDICINE CLINIC | Facility: CLINIC | Age: 68
End: 2023-06-29

## 2023-06-29 DIAGNOSIS — R73.03 PREDIABETES: ICD-10-CM

## 2023-06-29 DIAGNOSIS — E78.00 ELEVATED LDL CHOLESTEROL LEVEL: ICD-10-CM

## 2023-06-29 LAB
ALBUMIN SERPL-MCNC: 3.4 G/DL (ref 3.2–4.6)
ALBUMIN/GLOB SERPL: 1.2 (ref 0.4–1.6)
ALP SERPL-CCNC: 94 U/L (ref 50–136)
ALT SERPL-CCNC: 32 U/L (ref 12–65)
ANION GAP SERPL CALC-SCNC: 4 MMOL/L (ref 2–11)
AST SERPL-CCNC: 15 U/L (ref 15–37)
BASOPHILS # BLD: 0 K/UL (ref 0–0.2)
BASOPHILS NFR BLD: 1 % (ref 0–2)
BILIRUB SERPL-MCNC: 0.3 MG/DL (ref 0.2–1.1)
BUN SERPL-MCNC: 21 MG/DL (ref 8–23)
CALCIUM SERPL-MCNC: 9.3 MG/DL (ref 8.3–10.4)
CHLORIDE SERPL-SCNC: 109 MMOL/L (ref 101–110)
CHOLEST SERPL-MCNC: 215 MG/DL
CO2 SERPL-SCNC: 28 MMOL/L (ref 21–32)
CREAT SERPL-MCNC: 0.8 MG/DL (ref 0.6–1)
DIFFERENTIAL METHOD BLD: ABNORMAL
EOSINOPHIL # BLD: 0.2 K/UL (ref 0–0.8)
EOSINOPHIL NFR BLD: 4 % (ref 0.5–7.8)
ERYTHROCYTE [DISTWIDTH] IN BLOOD BY AUTOMATED COUNT: 13.7 % (ref 11.9–14.6)
GLOBULIN SER CALC-MCNC: 2.9 G/DL (ref 2.8–4.5)
GLUCOSE SERPL-MCNC: 116 MG/DL (ref 65–100)
HCT VFR BLD AUTO: 40.2 % (ref 35.8–46.3)
HDLC SERPL-MCNC: 69 MG/DL (ref 40–60)
HDLC SERPL: 3.1
HGB BLD-MCNC: 12.4 G/DL (ref 11.7–15.4)
IMM GRANULOCYTES # BLD AUTO: 0 K/UL (ref 0–0.5)
IMM GRANULOCYTES NFR BLD AUTO: 0 % (ref 0–5)
LDLC SERPL CALC-MCNC: 120 MG/DL
LYMPHOCYTES # BLD: 1.4 K/UL (ref 0.5–4.6)
LYMPHOCYTES NFR BLD: 22 % (ref 13–44)
MCH RBC QN AUTO: 30.6 PG (ref 26.1–32.9)
MCHC RBC AUTO-ENTMCNC: 30.8 G/DL (ref 31.4–35)
MCV RBC AUTO: 99.3 FL (ref 82–102)
MONOCYTES # BLD: 0.5 K/UL (ref 0.1–1.3)
MONOCYTES NFR BLD: 8 % (ref 4–12)
NEUTS SEG # BLD: 4.2 K/UL (ref 1.7–8.2)
NEUTS SEG NFR BLD: 65 % (ref 43–78)
NRBC # BLD: 0 K/UL (ref 0–0.2)
PLATELET # BLD AUTO: 248 K/UL (ref 150–450)
PMV BLD AUTO: 10.1 FL (ref 9.4–12.3)
POTASSIUM SERPL-SCNC: 5.1 MMOL/L (ref 3.5–5.1)
PROT SERPL-MCNC: 6.3 G/DL (ref 6.3–8.2)
RBC # BLD AUTO: 4.05 M/UL (ref 4.05–5.2)
SODIUM SERPL-SCNC: 141 MMOL/L (ref 133–143)
TRIGL SERPL-MCNC: 130 MG/DL (ref 35–150)
VLDLC SERPL CALC-MCNC: 26 MG/DL (ref 6–23)
WBC # BLD AUTO: 6.4 K/UL (ref 4.3–11.1)

## 2023-06-30 LAB
EST. AVERAGE GLUCOSE BLD GHB EST-MCNC: 134 MG/DL
HBA1C MFR BLD: 6.3 % (ref 4.8–5.6)

## 2023-07-06 ENCOUNTER — OFFICE VISIT (OUTPATIENT)
Dept: FAMILY MEDICINE CLINIC | Facility: CLINIC | Age: 68
End: 2023-07-06
Payer: MEDICARE

## 2023-07-06 VITALS
WEIGHT: 253.8 LBS | HEIGHT: 67 IN | DIASTOLIC BLOOD PRESSURE: 76 MMHG | BODY MASS INDEX: 39.83 KG/M2 | OXYGEN SATURATION: 97 % | SYSTOLIC BLOOD PRESSURE: 122 MMHG | HEART RATE: 71 BPM | RESPIRATION RATE: 16 BRPM

## 2023-07-06 DIAGNOSIS — E78.00 ELEVATED LDL CHOLESTEROL LEVEL: ICD-10-CM

## 2023-07-06 DIAGNOSIS — G89.29 CHRONIC BILATERAL LOW BACK PAIN WITHOUT SCIATICA: ICD-10-CM

## 2023-07-06 DIAGNOSIS — E66.01 CLASS 2 SEVERE OBESITY DUE TO EXCESS CALORIES WITH SERIOUS COMORBIDITY AND BODY MASS INDEX (BMI) OF 39.0 TO 39.9 IN ADULT (HCC): ICD-10-CM

## 2023-07-06 DIAGNOSIS — M54.50 CHRONIC BILATERAL LOW BACK PAIN WITHOUT SCIATICA: ICD-10-CM

## 2023-07-06 DIAGNOSIS — Z00.00 MEDICARE ANNUAL WELLNESS VISIT, SUBSEQUENT: ICD-10-CM

## 2023-07-06 DIAGNOSIS — R73.03 PREDIABETES: Primary | ICD-10-CM

## 2023-07-06 PROCEDURE — 1036F TOBACCO NON-USER: CPT | Performed by: FAMILY MEDICINE

## 2023-07-06 PROCEDURE — 1123F ACP DISCUSS/DSCN MKR DOCD: CPT | Performed by: FAMILY MEDICINE

## 2023-07-06 PROCEDURE — 99213 OFFICE O/P EST LOW 20 MIN: CPT | Performed by: FAMILY MEDICINE

## 2023-07-06 PROCEDURE — G0439 PPPS, SUBSEQ VISIT: HCPCS | Performed by: FAMILY MEDICINE

## 2023-07-06 PROCEDURE — G8427 DOCREV CUR MEDS BY ELIG CLIN: HCPCS | Performed by: FAMILY MEDICINE

## 2023-07-06 PROCEDURE — G8400 PT W/DXA NO RESULTS DOC: HCPCS | Performed by: FAMILY MEDICINE

## 2023-07-06 PROCEDURE — G8417 CALC BMI ABV UP PARAM F/U: HCPCS | Performed by: FAMILY MEDICINE

## 2023-07-06 PROCEDURE — 3017F COLORECTAL CA SCREEN DOC REV: CPT | Performed by: FAMILY MEDICINE

## 2023-07-06 PROCEDURE — 1090F PRES/ABSN URINE INCON ASSESS: CPT | Performed by: FAMILY MEDICINE

## 2023-07-06 RX ORDER — VENLAFAXINE HYDROCHLORIDE 225 MG/1
225 TABLET, EXTENDED RELEASE ORAL
COMMUNITY

## 2023-07-06 RX ORDER — PROPRANOLOL HYDROCHLORIDE 20 MG/1
20 TABLET ORAL 3 TIMES DAILY
COMMUNITY

## 2023-07-06 RX ORDER — IBUPROFEN 800 MG/1
1 TABLET ORAL EVERY 8 HOURS PRN
COMMUNITY
Start: 2023-03-21

## 2023-07-06 RX ORDER — ESCITALOPRAM OXALATE 20 MG/1
20 TABLET ORAL DAILY
COMMUNITY

## 2023-07-06 RX ORDER — ACETAMINOPHEN 500 MG
1000 TABLET ORAL 2 TIMES DAILY
COMMUNITY

## 2023-07-06 RX ORDER — FLUTICASONE PROPIONATE AND SALMETEROL 250; 50 UG/1; UG/1
1 POWDER RESPIRATORY (INHALATION) EVERY 12 HOURS
COMMUNITY

## 2023-07-06 ASSESSMENT — ENCOUNTER SYMPTOMS
BACK PAIN: 1
VOMITING: 0
BLOOD IN STOOL: 0
SHORTNESS OF BREATH: 0
ABDOMINAL PAIN: 0
NAUSEA: 0
COUGH: 0

## 2023-07-06 ASSESSMENT — LIFESTYLE VARIABLES
HOW MANY STANDARD DRINKS CONTAINING ALCOHOL DO YOU HAVE ON A TYPICAL DAY: PATIENT DOES NOT DRINK
HOW OFTEN DO YOU HAVE A DRINK CONTAINING ALCOHOL: NEVER

## 2023-07-06 ASSESSMENT — PATIENT HEALTH QUESTIONNAIRE - PHQ9
6. FEELING BAD ABOUT YOURSELF - OR THAT YOU ARE A FAILURE OR HAVE LET YOURSELF OR YOUR FAMILY DOWN: 0
3. TROUBLE FALLING OR STAYING ASLEEP: 2
7. TROUBLE CONCENTRATING ON THINGS, SUCH AS READING THE NEWSPAPER OR WATCHING TELEVISION: 0
5. POOR APPETITE OR OVEREATING: 1
4. FEELING TIRED OR HAVING LITTLE ENERGY: 1
SUM OF ALL RESPONSES TO PHQ9 QUESTIONS 1 & 2: 0
SUM OF ALL RESPONSES TO PHQ QUESTIONS 1-9: 4
10. IF YOU CHECKED OFF ANY PROBLEMS, HOW DIFFICULT HAVE THESE PROBLEMS MADE IT FOR YOU TO DO YOUR WORK, TAKE CARE OF THINGS AT HOME, OR GET ALONG WITH OTHER PEOPLE: 0
8. MOVING OR SPEAKING SO SLOWLY THAT OTHER PEOPLE COULD HAVE NOTICED. OR THE OPPOSITE, BEING SO FIGETY OR RESTLESS THAT YOU HAVE BEEN MOVING AROUND A LOT MORE THAN USUAL: 0
SUM OF ALL RESPONSES TO PHQ QUESTIONS 1-9: 4
1. LITTLE INTEREST OR PLEASURE IN DOING THINGS: 0
2. FEELING DOWN, DEPRESSED OR HOPELESS: 0
SUM OF ALL RESPONSES TO PHQ QUESTIONS 1-9: 4
SUM OF ALL RESPONSES TO PHQ QUESTIONS 1-9: 4
9. THOUGHTS THAT YOU WOULD BE BETTER OFF DEAD, OR OF HURTING YOURSELF: 0

## 2023-07-06 NOTE — PROGRESS NOTES
[address] Kait Veliz, DO  [address], 2000 [facility],[address]  Ph No:  [phone]  Fax:  [phone]        Assessment/Plan:   Mindy Fritz was seen today for follow-up and medicare awv. Diagnoses and all orders for this visit:    Prediabetes  Worsened. Reviewed below labs with patient. Advised increasing vegetables and fruits and high-fiber grains into her diet. Advised limiting dairy and meat. Discussed that she could try removing the unhealthy foods from her diet that she finds herself going to when anxious in making that a harder option to consume if is not in the home. Elevated LDL cholesterol level  Worsening. Reviewed below labs. Advised lifestyle changes with patient. Provided her with a handout of information and described juice that can help. Chronic bilateral low back pain without sciatica  Improving with PT. Radicular symptoms have resolved. Advised patient if the pain reaches a plateau then imaging or referral to orthopedics to discuss further intervention such as an injection but as the pain is continuing to improve every week with PT I would recommend continuing with the path she is on. Continue Tylenol and Advil. Advise she see what milligram of Tylenol she is using, if 500 or 650 she should decrease to 2 at a time. Class 2 severe obesity due to excess calories with serious comorbidity and body mass index (BMI) of 39.0 to 39.9 in [facility])  She would like to lose weight. Also benefit the A1c and cholesterol level which both worsened between appointments. We discussed strategies. Advised that exercise while beneficial for overall health is not necessarily the greatest option for losing weight but activities such as yoga and vivian chi would be beneficial for her back and to help increase her activity level.   Also advised plant-based diet emphasizing high-fiber grains, vegetables and fruits, limiting animal product such as meat and dairy can
providers/suppliers regularly involved in providing care):   Patient Care Team:  Bong Hussein DO as PCP - [address] [place] [address] as PCP - Empaneled Provider  Bk Marroquin MD (Neurology)  Nora Palacios MD (Dermatology)  ROCKY Crum - NP (Clinical Nurse Specialist [id number] [id number])     Reviewed and updated this visit:  Tobacco  Allergies  Meds  Med Hx  Surg Hx  Soc Hx  Fam Hx

## 2023-07-24 ENCOUNTER — PATIENT MESSAGE (OUTPATIENT)
Dept: FAMILY MEDICINE CLINIC | Facility: CLINIC | Age: 68
End: 2023-07-24

## 2023-07-24 DIAGNOSIS — M54.50 CHRONIC BILATERAL LOW BACK PAIN WITHOUT SCIATICA: Primary | ICD-10-CM

## 2023-07-24 DIAGNOSIS — G89.29 CHRONIC BILATERAL LOW BACK PAIN WITHOUT SCIATICA: Primary | ICD-10-CM

## 2023-07-26 RX ORDER — TIZANIDINE 2 MG/1
2 TABLET ORAL 2 TIMES DAILY PRN
Qty: 10 TABLET | Refills: 0 | Status: SHIPPED | OUTPATIENT
Start: 2023-07-26

## 2023-08-15 ENCOUNTER — OFFICE VISIT (OUTPATIENT)
Dept: ORTHOPEDIC SURGERY | Age: 68
End: 2023-08-15
Payer: MEDICARE

## 2023-08-15 VITALS — BODY MASS INDEX: 40.18 KG/M2 | WEIGHT: 250 LBS | HEIGHT: 66 IN

## 2023-08-15 DIAGNOSIS — M51.36 DDD (DEGENERATIVE DISC DISEASE), LUMBAR: ICD-10-CM

## 2023-08-15 DIAGNOSIS — M47.816 LUMBAR SPONDYLOSIS: ICD-10-CM

## 2023-08-15 DIAGNOSIS — M43.16 SPONDYLOLISTHESIS OF LUMBAR REGION: Primary | ICD-10-CM

## 2023-08-15 DIAGNOSIS — M54.16 LUMBAR RADICULOPATHY: ICD-10-CM

## 2023-08-15 PROCEDURE — G8427 DOCREV CUR MEDS BY ELIG CLIN: HCPCS | Performed by: PHYSICIAN ASSISTANT

## 2023-08-15 PROCEDURE — G8400 PT W/DXA NO RESULTS DOC: HCPCS | Performed by: PHYSICIAN ASSISTANT

## 2023-08-15 PROCEDURE — 3017F COLORECTAL CA SCREEN DOC REV: CPT | Performed by: PHYSICIAN ASSISTANT

## 2023-08-15 PROCEDURE — 1123F ACP DISCUSS/DSCN MKR DOCD: CPT | Performed by: PHYSICIAN ASSISTANT

## 2023-08-15 PROCEDURE — 99204 OFFICE O/P NEW MOD 45 MIN: CPT | Performed by: PHYSICIAN ASSISTANT

## 2023-08-15 PROCEDURE — G8417 CALC BMI ABV UP PARAM F/U: HCPCS | Performed by: PHYSICIAN ASSISTANT

## 2023-08-15 PROCEDURE — 1036F TOBACCO NON-USER: CPT | Performed by: PHYSICIAN ASSISTANT

## 2023-08-15 PROCEDURE — 1090F PRES/ABSN URINE INCON ASSESS: CPT | Performed by: PHYSICIAN ASSISTANT

## 2023-08-15 NOTE — PROGRESS NOTES
Name: Bhavesh Portillo  YOB: 1955  Gender: female  MRN: [id number]    CC:   Chief Complaint   Patient presents with    New Patient     Low back pain         HPI:   Bhavesh Portillo is a 79 y.o. female with a PMHx of depression. They present here for evaluation of back pain rating to the legs. This been going on since a fall on 3/1/2023 after which she fell on her tailbone. She did see her primary care provider a couple weeks after that who sent her to physical therapy with suspected bursitis. Patient reports having bilateral low back pain with left greater than right pain in her legs. Pain is primarily around her lateral hip and thigh area as well as partially in the left groin. No numbness or tingling. Pain is worse with standing sitting and walking. She is been taking ibuprofen and Tylenol. She has been in physical therapy for the last 8 weeks without improvement in her symptoms.     Pain Scale: 8/10  ADL's affected: Cleaning maintaining her home  Conservative treatment attempted: Physical therapy, Tylenol, ibuprofen          Past Medical History Includes:   Past Medical History:   Diagnosis Date    Anxiety     Asthma     Depression     Elevated LDL cholesterol level     Essential tremor     Melanoma in situ (HCC)     Osteoporosis     Prediabetes     RLS (restless legs syndrome)    ,   Past Surgical History:   Procedure Laterality Date     SECTION      GASTRIC BYPASS SURGERY      MALIGNANT SKIN LESION EXCISION      TONSILLECTOMY      TOTAL KNEE ARTHROPLASTY Left      Family History:   Family History   Problem Relation Age of Onset    Cancer Mother     Lung Cancer Mother     Cancer Sister     Melanoma Sister       Social History:   Social History     Tobacco Use    Smoking status: Never    Smokeless tobacco: Never   Substance Use Topics    Alcohol use: Never       ALLERGIES:   Allergies   Allergen Reactions    Bupropion Other (See Comments)     headache    Moxifloxacin Rash

## 2023-08-30 ENCOUNTER — OFFICE VISIT (OUTPATIENT)
Dept: ORTHOPEDIC SURGERY | Age: 68
End: 2023-08-30
Payer: MEDICARE

## 2023-08-30 VITALS — WEIGHT: 250 LBS | HEIGHT: 66 IN | BODY MASS INDEX: 40.18 KG/M2

## 2023-08-30 DIAGNOSIS — G83.4 CAUDA EQUINA SYNDROME (HCC): ICD-10-CM

## 2023-08-30 DIAGNOSIS — M48.062 SPINAL STENOSIS OF LUMBAR REGION WITH NEUROGENIC CLAUDICATION: Primary | ICD-10-CM

## 2023-08-30 PROCEDURE — 1123F ACP DISCUSS/DSCN MKR DOCD: CPT | Performed by: PHYSICIAN ASSISTANT

## 2023-08-30 PROCEDURE — 1090F PRES/ABSN URINE INCON ASSESS: CPT | Performed by: PHYSICIAN ASSISTANT

## 2023-08-30 PROCEDURE — 1036F TOBACCO NON-USER: CPT | Performed by: PHYSICIAN ASSISTANT

## 2023-08-30 PROCEDURE — G8428 CUR MEDS NOT DOCUMENT: HCPCS | Performed by: PHYSICIAN ASSISTANT

## 2023-08-30 PROCEDURE — 3017F COLORECTAL CA SCREEN DOC REV: CPT | Performed by: PHYSICIAN ASSISTANT

## 2023-08-30 PROCEDURE — G8417 CALC BMI ABV UP PARAM F/U: HCPCS | Performed by: PHYSICIAN ASSISTANT

## 2023-08-30 PROCEDURE — G8400 PT W/DXA NO RESULTS DOC: HCPCS | Performed by: PHYSICIAN ASSISTANT

## 2023-08-30 PROCEDURE — 99215 OFFICE O/P EST HI 40 MIN: CPT | Performed by: PHYSICIAN ASSISTANT

## 2023-08-30 NOTE — PROGRESS NOTES
08/30/23        Name: Ananya Vergara  YOB: 1955  Gender: female  MRN: [id number]    CC: Follow-up (Mri results)       HPI: Ananya Vergara is a 79 y.o. female who returns for Follow-up (Mri results)         Patient returns the office today for follow-up after lumbar MRI. She continues to have back pain rating to the legs although she is not having any saddle anesthesia, she does endorse having some urinary incontinence symptoms that have progressed. History was obtained by patient      Meds/PSH/PMH/FH/SH: This information has been reviewed. ALLERGIES:   Allergies   Allergen Reactions    Bupropion Other (See Comments)     headache    Moxifloxacin Rash    Penicillins Rash              Physical Examination:            Imaging:         MRI Result (most recent): MRI LUMBAR SPINE WO CONTRAST 08/25/2023    Narrative  EXAMINATION: MRI LUMBAR SPINE 8/25/2023 1:32 PM    ACCESSION NUMBER: [id number]    INDICATION: Spondylolisthesis, lumbar region; Radiculopathy, lumbar region;  Spondylosis without myelopathy or radiculopathy, lumbar region; Other  intervertebral disc degeneration, lumbar region    COMPARISON: Lumbar spine x-ray 8/15/2023    TECHNIQUE: Multisequence, multiplanar MR images were obtained of the lumbar  spine without the administration of intravenous contrast.    CONTRAST: None. FINDINGS:  SPINAL CORD: Normal morphology. The conus medullaris terminates at the L1-L2  disc level. NUMBERING: There are 5 non-rib bearing lumbar vertebrae with the lowest disc  space numbered L5-S1.  BONES: No acute fracture. Marrow signal unremarkable. ALIGNMENT: Grade 1 retrolisthesis L2 on L3. Grade 1 anterolisthesis L4-L5 and L5  on S1. SOFT TISSUES: Moderate paraspinal muscular atrophy. INTER-VERTEBRAL LEVELS:  T12-L1: Small central disc protrusion. No spinal canal or foraminal stenosis. L1-L2: No central canal or foraminal narrowing. L2-L3: Diffuse disc bulge with central disc protrusion.

## 2023-08-31 ENCOUNTER — OFFICE VISIT (OUTPATIENT)
Dept: ORTHOPEDIC SURGERY | Age: 68
End: 2023-08-31
Payer: MEDICARE

## 2023-08-31 DIAGNOSIS — M54.16 LUMBAR RADICULOPATHY: ICD-10-CM

## 2023-08-31 DIAGNOSIS — M43.16 SPONDYLOLISTHESIS OF LUMBAR REGION: ICD-10-CM

## 2023-08-31 DIAGNOSIS — M48.062 SPINAL STENOSIS OF LUMBAR REGION WITH NEUROGENIC CLAUDICATION: Primary | ICD-10-CM

## 2023-08-31 DIAGNOSIS — M24.28 HYPERTROPHY OF LIGAMENTUM FLAVUM: ICD-10-CM

## 2023-08-31 PROCEDURE — G8427 DOCREV CUR MEDS BY ELIG CLIN: HCPCS | Performed by: ORTHOPAEDIC SURGERY

## 2023-08-31 PROCEDURE — 1036F TOBACCO NON-USER: CPT | Performed by: ORTHOPAEDIC SURGERY

## 2023-08-31 PROCEDURE — 99214 OFFICE O/P EST MOD 30 MIN: CPT | Performed by: ORTHOPAEDIC SURGERY

## 2023-08-31 PROCEDURE — 1090F PRES/ABSN URINE INCON ASSESS: CPT | Performed by: ORTHOPAEDIC SURGERY

## 2023-08-31 PROCEDURE — 3017F COLORECTAL CA SCREEN DOC REV: CPT | Performed by: ORTHOPAEDIC SURGERY

## 2023-08-31 PROCEDURE — G8400 PT W/DXA NO RESULTS DOC: HCPCS | Performed by: ORTHOPAEDIC SURGERY

## 2023-08-31 PROCEDURE — 1123F ACP DISCUSS/DSCN MKR DOCD: CPT | Performed by: ORTHOPAEDIC SURGERY

## 2023-08-31 PROCEDURE — G8417 CALC BMI ABV UP PARAM F/U: HCPCS | Performed by: ORTHOPAEDIC SURGERY

## 2023-08-31 NOTE — PROGRESS NOTES
Name: Nikki Ontiveros  YOB: 1955  Gender: female  MRN: [id number]  Age: 79 y.o. Chief Complaint:  Radiating back and leg pain    History of Present Illness: The patient returns today in referral from Ruba Bennett with persistent symptoms of lumbar claudication and left-sided radiculopathy resulting in a significant functional decline as previously described. The symptoms are worse with simple activities of daily living including walking and standing and there has been no lasting benefit from conservative efforts including  20 visits of physical therapy, NSAIDs, Tylenol. She has had some urinary incontinence on occasion. However, she is able to ambulate without an assistive device albeit short distances . She has been found to have severe multilevel lumbar stenosis. She is sent for surgical consultation.        Medications:       Current Outpatient Medications:     tiZANidine (ZANAFLEX) 2 MG tablet, Take 1 tablet by mouth 2 times daily as needed (muscle spasm), Disp: 10 tablet, Rfl: 0    ibuprofen (ADVIL;MOTRIN) 800 MG tablet, Take 1 tablet by mouth every 8 hours as needed, Disp: , Rfl:     acetaminophen (TYLENOL) 500 MG tablet, Take 2 tablets by mouth in the morning and at bedtime, Disp: , Rfl:     propranolol (INDERAL) 20 MG tablet, Take 1 tablet by mouth 3 times daily, Disp: , Rfl:     venlafaxine 225 MG extended release tablet, Take 1 tablet by mouth daily (with breakfast), Disp: , Rfl:     escitalopram (LEXAPRO) 20 MG tablet, Take 1 tablet by mouth daily, Disp: , Rfl:     fluticasone-salmeterol (ADVAIR) 250-50 MCG/ACT AEPB diskus inhaler, Inhale 1 puff into the lungs in the morning and 1 puff in the evening., Disp: , Rfl:     fluticasone (FLONASE) 50 MCG/ACT nasal spray, 2 sprays by Each Nostril route daily, Disp: 48 g, Rfl: 3    primidone (MYSOLINE) 50 MG tablet, Take 3 tablets by mouth 4 times daily, Disp: 1080 tablet, Rfl: 3    albuterol sulfate HFA (VENTOLIN HFA) 108 (90 Base)

## 2023-09-07 ENCOUNTER — OFFICE VISIT (OUTPATIENT)
Dept: ORTHOPEDIC SURGERY | Age: 68
End: 2023-09-07

## 2023-09-07 DIAGNOSIS — M54.16 LUMBAR RADICULOPATHY: Primary | ICD-10-CM

## 2023-09-07 RX ORDER — TRIAMCINOLONE ACETONIDE 40 MG/ML
80 INJECTION, SUSPENSION INTRA-ARTICULAR; INTRAMUSCULAR ONCE
Status: COMPLETED | OUTPATIENT
Start: 2023-09-07 | End: 2023-09-07

## 2023-09-07 RX ADMIN — TRIAMCINOLONE ACETONIDE 80 MG: 40 INJECTION, SUSPENSION INTRA-ARTICULAR; INTRAMUSCULAR at 14:41

## 2023-09-07 NOTE — PROGRESS NOTES
Date: 09/07/23   Name: Thu Faust    Pre-Procedural Diagnosis:    Diagnosis Orders   1. Lumbar radiculopathy  FL NERVE BLOCK LUMBOSACRAL 1ST    triamcinolone acetonide (KENALOG-40) injection 80 mg          Procedure: Selective Nerve Root Blocks (Transforaminal) - Single Level    Precautions: [facility] Precautions spine injections: None. Patient denies any prior sensitivity to steroid, local anesthetic, contrast dye, iodine or shellfish. The procedure was discussed at length with the patient and informed consent was signed. The patient was placed in a prone position on the fluoroscopy table and the skin was prepped and draped in a routine sterile fashion. The areas to be injected was/were anesthetized with approximately 5 cc of 1% Lidocaine. A 22-gauge 3.5 inch spinal needle was carefully advanced under fluoroscopic guidance to the left L5 transforaminal space(s). At this time 0.25 cc of omnipaque administered. Once proper placement was confirmed, 2 cc of 0.25% Marcaine and 80 mg of Kenalog were injected through the spinal needle at that/each site. Fluoroscopic guidance was used intermittently over a 10-minute period to insure proper needle placement and patient safety. A hard copy of the fluoroscopic  images has been placed in the patient's chart. The patient was monitored after the procedure and discharged home in stable fashion. A total of 2 cc of Kenalog were administered during this procedure.     Resume Meds:  N/A        Isaac Loving MD  09/07/23

## 2023-09-08 ENCOUNTER — TELEPHONE (OUTPATIENT)
Dept: ORTHOPEDIC SURGERY | Age: 68
End: 2023-09-08

## 2023-09-11 ENCOUNTER — TELEPHONE (OUTPATIENT)
Dept: ORTHOPEDIC SURGERY | Age: 68
End: 2023-09-11

## 2023-09-12 NOTE — TELEPHONE ENCOUNTER
Discussed surgery dates and she would like to proceed with 10/10/2023. I have answered her questions regarding surgery.  Once this is scheduled, I will call her /mail her pre operative appointment

## 2023-09-18 ENCOUNTER — PREP FOR PROCEDURE (OUTPATIENT)
Dept: ORTHOPEDIC SURGERY | Age: 68
End: 2023-09-18

## 2023-09-18 DIAGNOSIS — M54.16 LUMBAR RADICULOPATHY: Primary | ICD-10-CM

## 2023-09-18 DIAGNOSIS — M48.062 SPINAL STENOSIS OF LUMBAR REGION WITH NEUROGENIC CLAUDICATION: ICD-10-CM

## 2023-09-18 DIAGNOSIS — M43.16 SPONDYLOLISTHESIS OF LUMBAR REGION: ICD-10-CM

## 2023-09-18 DIAGNOSIS — M24.28 HYPERTROPHY OF LIGAMENTUM FLAVUM: ICD-10-CM

## 2023-10-03 ENCOUNTER — HOSPITAL ENCOUNTER (OUTPATIENT)
Dept: SURGERY | Age: 68
Discharge: HOME | End: 2023-10-06
Payer: MEDICARE

## 2023-10-03 VITALS
TEMPERATURE: 97.1 F | WEIGHT: 237.8 LBS | RESPIRATION RATE: 16 BRPM | DIASTOLIC BLOOD PRESSURE: 69 MMHG | HEART RATE: 68 BPM | BODY MASS INDEX: 37.32 KG/M2 | OXYGEN SATURATION: 96 % | SYSTOLIC BLOOD PRESSURE: 152 MMHG | HEIGHT: 67 IN

## 2023-10-03 LAB
ANION GAP SERPL CALC-SCNC: 4 MMOL/L (ref 2–11)
APPEARANCE UR: ABNORMAL
BACTERIA URNS QL MICRO: ABNORMAL /HPF
BASOPHILS # BLD: 0 K/UL (ref 0–0.2)
BASOPHILS NFR BLD: 1 % (ref 0–2)
BILIRUB UR QL: NEGATIVE
BUN SERPL-MCNC: 31 MG/DL (ref 8–23)
CALCIUM SERPL-MCNC: 9.5 MG/DL (ref 8.3–10.4)
CHLORIDE SERPL-SCNC: 108 MMOL/L (ref 101–110)
CO2 SERPL-SCNC: 27 MMOL/L (ref 21–32)
COLOR UR: ABNORMAL
CREAT SERPL-MCNC: 0.7 MG/DL (ref 0.6–1)
DIFFERENTIAL METHOD BLD: ABNORMAL
EOSINOPHIL # BLD: 0.1 K/UL (ref 0–0.8)
EOSINOPHIL NFR BLD: 2 % (ref 0.5–7.8)
EPI CELLS #/AREA URNS HPF: ABNORMAL /HPF
ERYTHROCYTE [DISTWIDTH] IN BLOOD BY AUTOMATED COUNT: 14 % (ref 11.9–14.6)
GLUCOSE SERPL-MCNC: 98 MG/DL (ref 65–100)
GLUCOSE UR STRIP.AUTO-MCNC: NEGATIVE MG/DL
HCT VFR BLD AUTO: 42.5 % (ref 35.8–46.3)
HGB BLD-MCNC: 13.3 G/DL (ref 11.7–15.4)
HGB UR QL STRIP: NEGATIVE
IMM GRANULOCYTES # BLD AUTO: 0 K/UL (ref 0–0.5)
IMM GRANULOCYTES NFR BLD AUTO: 0 % (ref 0–5)
KETONES UR QL STRIP.AUTO: ABNORMAL MG/DL
LEUKOCYTE ESTERASE UR QL STRIP.AUTO: ABNORMAL
LYMPHOCYTES # BLD: 1.2 K/UL (ref 0.5–4.6)
LYMPHOCYTES NFR BLD: 19 % (ref 13–44)
MCH RBC QN AUTO: 29.8 PG (ref 26.1–32.9)
MCHC RBC AUTO-ENTMCNC: 31.3 G/DL (ref 31.4–35)
MCV RBC AUTO: 95.1 FL (ref 82–102)
MONOCYTES # BLD: 0.5 K/UL (ref 0.1–1.3)
MONOCYTES NFR BLD: 7 % (ref 4–12)
NEUTS SEG # BLD: 4.5 K/UL (ref 1.7–8.2)
NEUTS SEG NFR BLD: 71 % (ref 43–78)
NITRITE UR QL STRIP.AUTO: NEGATIVE
NRBC # BLD: 0 K/UL (ref 0–0.2)
PH UR STRIP: 5.5 (ref 5–9)
PLATELET # BLD AUTO: 274 K/UL (ref 150–450)
PMV BLD AUTO: 10.5 FL (ref 9.4–12.3)
POTASSIUM SERPL-SCNC: 5 MMOL/L (ref 3.5–5.1)
PROT UR STRIP-MCNC: NEGATIVE MG/DL
RBC # BLD AUTO: 4.47 M/UL (ref 4.05–5.2)
RBC #/AREA URNS HPF: ABNORMAL /HPF
SODIUM SERPL-SCNC: 139 MMOL/L (ref 133–143)
SP GR UR REFRACTOMETRY: 1.03 (ref 1–1.02)
UROBILINOGEN UR QL STRIP.AUTO: 0.2 EU/DL (ref 0.2–1)
WBC # BLD AUTO: 6.4 K/UL (ref 4.3–11.1)
WBC URNS QL MICRO: ABNORMAL /HPF

## 2023-10-03 PROCEDURE — 81001 URINALYSIS AUTO W/SCOPE: CPT

## 2023-10-03 PROCEDURE — 85025 COMPLETE CBC W/AUTO DIFF WBC: CPT

## 2023-10-03 PROCEDURE — 80048 BASIC METABOLIC PNL TOTAL CA: CPT

## 2023-10-03 PROCEDURE — 87641 MR-STAPH DNA AMP PROBE: CPT

## 2023-10-03 RX ORDER — VENLAFAXINE 75 MG/1
225 TABLET ORAL DAILY
Status: ON HOLD | COMMUNITY
End: 2023-10-10

## 2023-10-03 RX ORDER — FLUTICASONE PROPIONATE 50 MCG
1 SPRAY, SUSPENSION (ML) NASAL 2 TIMES DAILY
COMMUNITY

## 2023-10-03 RX ORDER — GABAPENTIN 100 MG/1
200 CAPSULE ORAL EVERY EVENING
COMMUNITY

## 2023-10-03 NOTE — PERIOP NOTE
All labs reviewed. UA abnormal and MRSA/MSSA results pending. Labs automatically routed to ordering provider via Epic documentation. Abnormal UA sent to [facility] JEROMY ZAYAS NP to review.

## 2023-10-03 NOTE — PERIOP NOTE
PLEASE CONTINUE TAKING ALL PRESCRIPTION MEDICATIONS UP TO THE DAY OF SURGERY UNLESS OTHERWISE DIRECTED BELOW. DISCONTINUE all vitamins and supplements 7 days prior to surgery. DISCONTINUE Non-Steroidal Anti-Inflammatory (NSAIDS) such as Advil, Ibuprofen, Excedrin, Motrin, and Aleve 5 days prior to surgery. Home Medications to take  the day of surgery      Advair inhaler     Propranolol (Inderal)   Primidone (Mysoline)  Venlafazine (Effexor)     Home Medications   to Hold   Ibuprofen  Zinc   Centrum Multivitamin  Magnesium     Comments     LEVON-HEX shower the night before surgery and the morning of surgery. On the day before surgery please take Acetaminophen 1000mg in the morning and then again before bed. You may substitute for Tylenol 650 mg.      Bring Proair inhaler the day of surgery       Please do not bring home medications with you on the day of surgery unless otherwise directed by your nurse. If you are instructed to bring home medications, please give them to your nurse as they will be administered by the nursing staff. If you have any questions, please call [address] [place] [address] [phone]. A copy of this note was provided to the patient for reference.

## 2023-10-03 NOTE — PERIOP NOTE
Patient verified name, , and surgery as listed in [facility]. Patient provided medical/health information and PTA medications to the best of their ability. TYPE  CASE: 3  Orders per surgeon:  received  Labs per surgeon: CBC with diff, BMP, UA, MRSA/MSSA. Results: pending. Labs per anesthesia protocol: T/S DOS. EKG:  NA     MRSA/MSSA swab collected; pharmacy to review and dose antibiotic as appropriate. Patient provided with and instructed on education handouts including Guide to Surgery, blood transfusions, pain management, and hand hygiene for the family and community, and [facility] brochure. Road to Recovery Spine surgery patient guide given. Instructed on incentive spirometry. Hibiclens and instructions given per hospital policy. Original medication prescription bottles were visualized during patient appointment. Patient teach back successful and patient demonstrates knowledge of instruction.

## 2023-10-04 LAB
MRSA DNA SPEC QL NAA+PROBE: NOT DETECTED
S AUREUS CPE NOSE QL NAA+PROBE: NOT DETECTED

## 2023-10-04 NOTE — PROGRESS NOTES
Pre-Assessment Surgery Review      Patient ID:  Vika Carvajal  [id number]  11 y.o.  1955  Surgeon: Dr Mirella Devi  Date of Surgery: 10/10/2023  Procedure:   laminectomy and L4-S1 fusion   Primary Care Physician: Ron Malagon [phone]  Specialty Physician(s):      Subjective: Vika Carvajal is a 79 y.o. Declined female who presents for preoperative evaluation. This is a preoperative chart review note based on data collected by the nurse at the surgical Pre-Assessment visit. Past Medical History:   Diagnosis Date    Anxiety     Asthma     managed with inhalers    Depression     Elevated LDL cholesterol level     Essential tremor     History of gastric bypass     History of melanoma     removed from Left wrist area    Osteoporosis     pt denies    Prediabetes     no current medications/pt doesnt monitor BS/Last A1c: 6.3 on 23    RLS (restless legs syndrome)     Spinal stenosis of lumbar region with neurogenic claudication     surgery scheduled on 10.10.23      Past Surgical History:   Procedure Laterality Date     SECTION      X3    COLONOSCOPY      GASTRIC BYPASS SURGERY      MALIGNANT SKIN LESION EXCISION      melanoma removed from Left wrist area    TONSILLECTOMY      TOTAL KNEE ARTHROPLASTY Left     WISDOM TOOTH EXTRACTION       Family History   Problem Relation Age of Onset    Cancer Mother     Lung Cancer Mother     No Known Problems Father     Cancer Sister     Melanoma Sister     No Known Problems Sister     Other Child         autoimmune      Social History     Tobacco Use    Smoking status: Never    Smokeless tobacco: Never   Substance Use Topics    Alcohol use: Never       Prior to Admission medications    Medication Sig Start Date End Date Taking?  Authorizing Provider   fluticasone (FLONASE) 50 MCG/ACT nasal spray 1 spray by Each Nostril route in the morning and at bedtime    Provider, MD Tiffany   venlafaxine (EFFEXOR) 75 MG tablet Take 3 tablets by

## 2023-10-05 ENCOUNTER — PATIENT MESSAGE (OUTPATIENT)
Dept: FAMILY MEDICINE CLINIC | Facility: CLINIC | Age: 68
End: 2023-10-05

## 2023-10-05 DIAGNOSIS — J45.909 UNCOMPLICATED ASTHMA, UNSPECIFIED ASTHMA SEVERITY, UNSPECIFIED WHETHER PERSISTENT: Primary | ICD-10-CM

## 2023-10-05 RX ORDER — FLUTICASONE PROPIONATE AND SALMETEROL 250; 50 UG/1; UG/1
1 POWDER RESPIRATORY (INHALATION) EVERY 12 HOURS
Qty: 1 EACH | Refills: 5 | Status: SHIPPED | OUTPATIENT
Start: 2023-10-05

## 2023-10-09 ENCOUNTER — ANESTHESIA EVENT (OUTPATIENT)
Dept: SURGERY | Age: 68
End: 2023-10-09
Payer: MEDICARE

## 2023-10-10 ENCOUNTER — HOSPITAL ENCOUNTER (INPATIENT)
Age: 68
LOS: 4 days | Discharge: HOME | End: 2023-10-14
Attending: ORTHOPAEDIC SURGERY | Admitting: ORTHOPAEDIC SURGERY
Payer: MEDICARE

## 2023-10-10 ENCOUNTER — APPOINTMENT (OUTPATIENT)
Dept: GENERAL RADIOLOGY | Age: 68
End: 2023-10-10
Attending: ORTHOPAEDIC SURGERY
Payer: MEDICARE

## 2023-10-10 ENCOUNTER — ANESTHESIA (OUTPATIENT)
Dept: SURGERY | Age: 68
End: 2023-10-10
Payer: MEDICARE

## 2023-10-10 DIAGNOSIS — M48.062 SPINAL STENOSIS OF LUMBAR REGION WITH NEUROGENIC CLAUDICATION: Primary | ICD-10-CM

## 2023-10-10 PROBLEM — Z98.1 S/P LUMBAR FUSION: Status: ACTIVE | Noted: 2023-10-10

## 2023-10-10 LAB
ABO + RH BLD: NORMAL
BLOOD GROUP ANTIBODIES SERPL: NORMAL
SPECIMEN EXP DATE BLD: NORMAL

## 2023-10-10 PROCEDURE — 00NY0ZZ RELEASE LUMBAR SPINAL CORD, OPEN APPROACH: ICD-10-PCS | Performed by: ORTHOPAEDIC SURGERY

## 2023-10-10 PROCEDURE — 63053 LAM FACTC/FRMT ARTHRD LUM EA: CPT | Performed by: ORTHOPAEDIC SURGERY

## 2023-10-10 PROCEDURE — 2500000003 HC RX 250 WO HCPCS: Performed by: NURSE ANESTHETIST, CERTIFIED REGISTERED

## 2023-10-10 PROCEDURE — 2580000003 HC RX 258: Performed by: ORTHOPAEDIC SURGERY

## 2023-10-10 PROCEDURE — C1889 IMPLANT/INSERT DEVICE, NOC: HCPCS | Performed by: ORTHOPAEDIC SURGERY

## 2023-10-10 PROCEDURE — 6360000002 HC RX W HCPCS: Performed by: ORTHOPAEDIC SURGERY

## 2023-10-10 PROCEDURE — 0SG30AJ FUSION OF LUMBOSACRAL JOINT WITH INTERBODY FUSION DEVICE, POSTERIOR APPROACH, ANTERIOR COLUMN, OPEN APPROACH: ICD-10-PCS | Performed by: ORTHOPAEDIC SURGERY

## 2023-10-10 PROCEDURE — 7100000001 HC PACU RECOVERY - ADDTL 15 MIN: Performed by: ORTHOPAEDIC SURGERY

## 2023-10-10 PROCEDURE — 86900 BLOOD TYPING SEROLOGIC ABO: CPT

## 2023-10-10 PROCEDURE — 86850 RBC ANTIBODY SCREEN: CPT

## 2023-10-10 PROCEDURE — 0SG10K1 FUSION OF 2 OR MORE LUMBAR VERTEBRAL JOINTS WITH NONAUTOLOGOUS TISSUE SUBSTITUTE, POSTERIOR APPROACH, POSTERIOR COLUMN, OPEN APPROACH: ICD-10-PCS | Performed by: ORTHOPAEDIC SURGERY

## 2023-10-10 PROCEDURE — 6360000002 HC RX W HCPCS: Performed by: STUDENT IN AN ORGANIZED HEALTH CARE EDUCATION/TRAINING PROGRAM

## 2023-10-10 PROCEDURE — 6370000000 HC RX 637 (ALT 250 FOR IP): Performed by: ORTHOPAEDIC SURGERY

## 2023-10-10 PROCEDURE — C1713 ANCHOR/SCREW BN/BN,TIS/BN: HCPCS | Performed by: ORTHOPAEDIC SURGERY

## 2023-10-10 PROCEDURE — 22842 INSERT SPINE FIXATION DEVICE: CPT | Performed by: ORTHOPAEDIC SURGERY

## 2023-10-10 PROCEDURE — 0ST20ZZ RESECTION OF LUMBAR VERTEBRAL DISC, OPEN APPROACH: ICD-10-PCS | Performed by: ORTHOPAEDIC SURGERY

## 2023-10-10 PROCEDURE — 63047 LAM FACETEC & FORAMOT LUMBAR: CPT | Performed by: ORTHOPAEDIC SURGERY

## 2023-10-10 PROCEDURE — 63052 LAM FACETC/FRMT ARTHRD LUM 1: CPT | Performed by: ORTHOPAEDIC SURGERY

## 2023-10-10 PROCEDURE — 2709999900 HC NON-CHARGEABLE SUPPLY: Performed by: ORTHOPAEDIC SURGERY

## 2023-10-10 PROCEDURE — 3700000001 HC ADD 15 MINUTES (ANESTHESIA): Performed by: ORTHOPAEDIC SURGERY

## 2023-10-10 PROCEDURE — 0SG00AJ FUSION OF LUMBAR VERTEBRAL JOINT WITH INTERBODY FUSION DEVICE, POSTERIOR APPROACH, ANTERIOR COLUMN, OPEN APPROACH: ICD-10-PCS | Performed by: ORTHOPAEDIC SURGERY

## 2023-10-10 PROCEDURE — 94640 AIRWAY INHALATION TREATMENT: CPT

## 2023-10-10 PROCEDURE — 22634 ARTHRD CMBN 1NTRSPC EA ADDL: CPT | Performed by: ORTHOPAEDIC SURGERY

## 2023-10-10 PROCEDURE — 3600000014 HC SURGERY LEVEL 4 ADDTL 15MIN: Performed by: ORTHOPAEDIC SURGERY

## 2023-10-10 PROCEDURE — 22633 ARTHRD CMBN 1NTRSPC LUMBAR: CPT | Performed by: ORTHOPAEDIC SURGERY

## 2023-10-10 PROCEDURE — 72100 X-RAY EXAM L-S SPINE 2/3 VWS: CPT

## 2023-10-10 PROCEDURE — 6360000002 HC RX W HCPCS: Performed by: NURSE ANESTHETIST, CERTIFIED REGISTERED

## 2023-10-10 PROCEDURE — 20930 SP BONE ALGRFT MORSEL ADD-ON: CPT | Performed by: ORTHOPAEDIC SURGERY

## 2023-10-10 PROCEDURE — 86901 BLOOD TYPING SEROLOGIC RH(D): CPT

## 2023-10-10 PROCEDURE — 2580000003 HC RX 258: Performed by: STUDENT IN AN ORGANIZED HEALTH CARE EDUCATION/TRAINING PROGRAM

## 2023-10-10 PROCEDURE — 1100000000 HC RM PRIVATE

## 2023-10-10 PROCEDURE — 3600000004 HC SURGERY LEVEL 4 BASE: Performed by: ORTHOPAEDIC SURGERY

## 2023-10-10 PROCEDURE — C1763 CONN TISS, NON-HUMAN: HCPCS | Performed by: ORTHOPAEDIC SURGERY

## 2023-10-10 PROCEDURE — 63048 LAM FACETEC &FORAMOT EA ADDL: CPT | Performed by: ORTHOPAEDIC SURGERY

## 2023-10-10 PROCEDURE — 51798 US URINE CAPACITY MEASURE: CPT

## 2023-10-10 PROCEDURE — 22614 ARTHRD PST TQ 1NTRSPC EA ADD: CPT | Performed by: ORTHOPAEDIC SURGERY

## 2023-10-10 PROCEDURE — 3700000000 HC ANESTHESIA ATTENDED CARE: Performed by: ORTHOPAEDIC SURGERY

## 2023-10-10 PROCEDURE — 7100000000 HC PACU RECOVERY - FIRST 15 MIN: Performed by: ORTHOPAEDIC SURGERY

## 2023-10-10 PROCEDURE — 22853 INSJ BIOMECHANICAL DEVICE: CPT | Performed by: ORTHOPAEDIC SURGERY

## 2023-10-10 PROCEDURE — 0SG30K1 FUSION OF LUMBOSACRAL JOINT WITH NONAUTOLOGOUS TISSUE SUBSTITUTE, POSTERIOR APPROACH, POSTERIOR COLUMN, OPEN APPROACH: ICD-10-PCS | Performed by: ORTHOPAEDIC SURGERY

## 2023-10-10 PROCEDURE — 2720000010 HC SURG SUPPLY STERILE: Performed by: ORTHOPAEDIC SURGERY

## 2023-10-10 PROCEDURE — 94760 N-INVAS EAR/PLS OXIMETRY 1: CPT

## 2023-10-10 DEVICE — GRAFT BNE LG: Type: IMPLANTABLE DEVICE | Site: SPINE LUMBAR | Status: FUNCTIONAL

## 2023-10-10 DEVICE — BIO DBM PLUS PUTTY (WITH CANCELLOUS)
Type: IMPLANTABLE DEVICE | Site: SPINE LUMBAR | Status: FUNCTIONAL
Brand: BIO DBM

## 2023-10-10 DEVICE — DURAGEN® DURAL GRAFT MATRIX 1PK, 2X2 DOM
Type: IMPLANTABLE DEVICE | Site: SPINE LUMBAR | Status: FUNCTIONAL
Brand: DURAGEN®

## 2023-10-10 DEVICE — POLYAXIAL SCREW
Type: IMPLANTABLE DEVICE | Site: SPINE LUMBAR | Status: FUNCTIONAL
Brand: XIA 3 SYSTEM - SERRATO

## 2023-10-10 DEVICE — TI ALLOY RAD ROD
Type: IMPLANTABLE DEVICE | Site: SPINE LUMBAR | Status: FUNCTIONAL
Brand: XIA 3

## 2023-10-10 DEVICE — SPACER SPNL 15 DEG SM 28X10 MM STRL PROLIFT: Type: IMPLANTABLE DEVICE | Site: SPINE LUMBAR | Status: FUNCTIONAL

## 2023-10-10 DEVICE — ALLOGRAFT BNE CHIP 1-4 MM 15 CC CRUSH CANC: Type: IMPLANTABLE DEVICE | Site: SPINE LUMBAR | Status: FUNCTIONAL

## 2023-10-10 DEVICE — BLOCKER
Type: IMPLANTABLE DEVICE | Site: SPINE LUMBAR | Status: FUNCTIONAL
Brand: XIA 3

## 2023-10-10 RX ORDER — VANCOMYCIN HYDROCHLORIDE 1 G/20ML
INJECTION, POWDER, LYOPHILIZED, FOR SOLUTION INTRAVENOUS PRN
Status: DISCONTINUED | OUTPATIENT
Start: 2023-10-10 | End: 2023-10-10 | Stop reason: ALTCHOICE

## 2023-10-10 RX ORDER — DEXAMETHASONE SODIUM PHOSPHATE 4 MG/ML
INJECTION, SOLUTION INTRA-ARTICULAR; INTRALESIONAL; INTRAMUSCULAR; INTRAVENOUS; SOFT TISSUE PRN
Status: DISCONTINUED | OUTPATIENT
Start: 2023-10-10 | End: 2023-10-10 | Stop reason: SDUPTHER

## 2023-10-10 RX ORDER — EPHEDRINE SULFATE/0.9% NACL/PF 50 MG/5 ML
SYRINGE (ML) INTRAVENOUS PRN
Status: DISCONTINUED | OUTPATIENT
Start: 2023-10-10 | End: 2023-10-10 | Stop reason: SDUPTHER

## 2023-10-10 RX ORDER — VENLAFAXINE HYDROCHLORIDE 75 MG/1
225 CAPSULE, EXTENDED RELEASE ORAL DAILY
COMMUNITY

## 2023-10-10 RX ORDER — HYDROMORPHONE HYDROCHLORIDE 2 MG/ML
0.5 INJECTION, SOLUTION INTRAMUSCULAR; INTRAVENOUS; SUBCUTANEOUS EVERY 5 MIN PRN
Status: DISCONTINUED | OUTPATIENT
Start: 2023-10-10 | End: 2023-10-10 | Stop reason: HOSPADM

## 2023-10-10 RX ORDER — KETOROLAC TROMETHAMINE 30 MG/ML
INJECTION, SOLUTION INTRAMUSCULAR; INTRAVENOUS PRN
Status: DISCONTINUED | OUTPATIENT
Start: 2023-10-10 | End: 2023-10-10 | Stop reason: SDUPTHER

## 2023-10-10 RX ORDER — ONDANSETRON 2 MG/ML
4 INJECTION INTRAMUSCULAR; INTRAVENOUS EVERY 6 HOURS PRN
Status: DISCONTINUED | OUTPATIENT
Start: 2023-10-10 | End: 2023-10-14 | Stop reason: HOSPADM

## 2023-10-10 RX ORDER — FENTANYL CITRATE 50 UG/ML
100 INJECTION, SOLUTION INTRAMUSCULAR; INTRAVENOUS
Status: DISCONTINUED | OUTPATIENT
Start: 2023-10-10 | End: 2023-10-10 | Stop reason: HOSPADM

## 2023-10-10 RX ORDER — PROCHLORPERAZINE EDISYLATE 5 MG/ML
5 INJECTION INTRAMUSCULAR; INTRAVENOUS
Status: COMPLETED | OUTPATIENT
Start: 2023-10-10 | End: 2023-10-10

## 2023-10-10 RX ORDER — ONDANSETRON 2 MG/ML
INJECTION INTRAMUSCULAR; INTRAVENOUS PRN
Status: DISCONTINUED | OUTPATIENT
Start: 2023-10-10 | End: 2023-10-10 | Stop reason: SDUPTHER

## 2023-10-10 RX ORDER — BISACODYL 5 MG/1
5 TABLET, DELAYED RELEASE ORAL DAILY
Status: DISCONTINUED | OUTPATIENT
Start: 2023-10-10 | End: 2023-10-14 | Stop reason: HOSPADM

## 2023-10-10 RX ORDER — MIDAZOLAM HYDROCHLORIDE 2 MG/2ML
2 INJECTION, SOLUTION INTRAMUSCULAR; INTRAVENOUS
Status: COMPLETED | OUTPATIENT
Start: 2023-10-10 | End: 2023-10-10

## 2023-10-10 RX ORDER — VENLAFAXINE HYDROCHLORIDE 75 MG/1
225 CAPSULE, EXTENDED RELEASE ORAL DAILY
Status: DISCONTINUED | OUTPATIENT
Start: 2023-10-11 | End: 2023-10-14 | Stop reason: HOSPADM

## 2023-10-10 RX ORDER — GABAPENTIN 100 MG/1
200 CAPSULE ORAL EVERY EVENING
Status: DISCONTINUED | OUTPATIENT
Start: 2023-10-10 | End: 2023-10-13

## 2023-10-10 RX ORDER — CYCLOBENZAPRINE HCL 5 MG
10 TABLET ORAL 3 TIMES DAILY PRN
Status: DISCONTINUED | OUTPATIENT
Start: 2023-10-10 | End: 2023-10-14 | Stop reason: HOSPADM

## 2023-10-10 RX ORDER — SODIUM CHLORIDE 9 MG/ML
INJECTION, SOLUTION INTRAVENOUS PRN
Status: DISCONTINUED | OUTPATIENT
Start: 2023-10-10 | End: 2023-10-10 | Stop reason: HOSPADM

## 2023-10-10 RX ORDER — ESCITALOPRAM OXALATE 10 MG/1
20 TABLET ORAL
Status: DISCONTINUED | OUTPATIENT
Start: 2023-10-10 | End: 2023-10-14 | Stop reason: HOSPADM

## 2023-10-10 RX ORDER — ACETAMINOPHEN 500 MG
1000 TABLET ORAL ONCE
Status: DISCONTINUED | OUTPATIENT
Start: 2023-10-10 | End: 2023-10-10 | Stop reason: HOSPADM

## 2023-10-10 RX ORDER — OXYCODONE HYDROCHLORIDE 5 MG/1
5 TABLET ORAL EVERY 6 HOURS PRN
Qty: 28 TABLET | Refills: 0 | Status: SHIPPED | OUTPATIENT
Start: 2023-10-10 | End: 2023-10-17

## 2023-10-10 RX ORDER — DIPHENHYDRAMINE HYDROCHLORIDE 50 MG/ML
25 INJECTION INTRAMUSCULAR; INTRAVENOUS EVERY 6 HOURS PRN
Status: DISCONTINUED | OUTPATIENT
Start: 2023-10-10 | End: 2023-10-14 | Stop reason: HOSPADM

## 2023-10-10 RX ORDER — SODIUM CHLORIDE 9 MG/ML
INJECTION, SOLUTION INTRAVENOUS PRN
Status: DISCONTINUED | OUTPATIENT
Start: 2023-10-10 | End: 2023-10-14 | Stop reason: HOSPADM

## 2023-10-10 RX ORDER — ALBUTEROL SULFATE 90 UG/1
2 AEROSOL, METERED RESPIRATORY (INHALATION) 4 TIMES DAILY PRN
Status: DISCONTINUED | OUTPATIENT
Start: 2023-10-10 | End: 2023-10-14 | Stop reason: HOSPADM

## 2023-10-10 RX ORDER — SODIUM CHLORIDE 0.9 % (FLUSH) 0.9 %
5-40 SYRINGE (ML) INJECTION PRN
Status: DISCONTINUED | OUTPATIENT
Start: 2023-10-10 | End: 2023-10-10 | Stop reason: HOSPADM

## 2023-10-10 RX ORDER — ONDANSETRON 2 MG/ML
4 INJECTION INTRAMUSCULAR; INTRAVENOUS
Status: DISCONTINUED | OUTPATIENT
Start: 2023-10-10 | End: 2023-10-10 | Stop reason: HOSPADM

## 2023-10-10 RX ORDER — SODIUM CHLORIDE 0.9 % (FLUSH) 0.9 %
5-40 SYRINGE (ML) INJECTION EVERY 12 HOURS SCHEDULED
Status: DISCONTINUED | OUTPATIENT
Start: 2023-10-10 | End: 2023-10-14 | Stop reason: HOSPADM

## 2023-10-10 RX ORDER — FLUTICASONE PROPIONATE 50 MCG
1 SPRAY, SUSPENSION (ML) NASAL 2 TIMES DAILY
Status: DISCONTINUED | OUTPATIENT
Start: 2023-10-10 | End: 2023-10-14 | Stop reason: HOSPADM

## 2023-10-10 RX ORDER — OXYCODONE HYDROCHLORIDE 5 MG/1
5 TABLET ORAL EVERY 4 HOURS PRN
Status: DISCONTINUED | OUTPATIENT
Start: 2023-10-10 | End: 2023-10-14 | Stop reason: HOSPADM

## 2023-10-10 RX ORDER — TRANEXAMIC ACID 100 MG/ML
INJECTION, SOLUTION INTRAVENOUS PRN
Status: DISCONTINUED | OUTPATIENT
Start: 2023-10-10 | End: 2023-10-10 | Stop reason: SDUPTHER

## 2023-10-10 RX ORDER — OXYCODONE HYDROCHLORIDE 5 MG/1
10 TABLET ORAL EVERY 4 HOURS PRN
Status: DISCONTINUED | OUTPATIENT
Start: 2023-10-10 | End: 2023-10-14 | Stop reason: HOSPADM

## 2023-10-10 RX ORDER — SODIUM CHLORIDE 9 MG/ML
INJECTION, SOLUTION INTRAVENOUS CONTINUOUS
Status: ACTIVE | OUTPATIENT
Start: 2023-10-10 | End: 2023-10-11

## 2023-10-10 RX ORDER — PROMETHAZINE HYDROCHLORIDE 25 MG/1
12.5 TABLET ORAL EVERY 6 HOURS PRN
Status: DISCONTINUED | OUTPATIENT
Start: 2023-10-10 | End: 2023-10-14 | Stop reason: HOSPADM

## 2023-10-10 RX ORDER — GLYCOPYRROLATE 0.2 MG/ML
INJECTION INTRAMUSCULAR; INTRAVENOUS PRN
Status: DISCONTINUED | OUTPATIENT
Start: 2023-10-10 | End: 2023-10-10 | Stop reason: SDUPTHER

## 2023-10-10 RX ORDER — ROCURONIUM BROMIDE 10 MG/ML
INJECTION, SOLUTION INTRAVENOUS PRN
Status: DISCONTINUED | OUTPATIENT
Start: 2023-10-10 | End: 2023-10-10 | Stop reason: SDUPTHER

## 2023-10-10 RX ORDER — NEOSTIGMINE METHYLSULFATE 1 MG/ML
INJECTION, SOLUTION INTRAVENOUS PRN
Status: DISCONTINUED | OUTPATIENT
Start: 2023-10-10 | End: 2023-10-10 | Stop reason: SDUPTHER

## 2023-10-10 RX ORDER — SODIUM CHLORIDE, SODIUM LACTATE, POTASSIUM CHLORIDE, CALCIUM CHLORIDE 600; 310; 30; 20 MG/100ML; MG/100ML; MG/100ML; MG/100ML
INJECTION, SOLUTION INTRAVENOUS CONTINUOUS
Status: DISCONTINUED | OUTPATIENT
Start: 2023-10-10 | End: 2023-10-10 | Stop reason: HOSPADM

## 2023-10-10 RX ORDER — LIDOCAINE HYDROCHLORIDE 10 MG/ML
1 INJECTION, SOLUTION INFILTRATION; PERINEURAL
Status: DISCONTINUED | OUTPATIENT
Start: 2023-10-10 | End: 2023-10-10 | Stop reason: HOSPADM

## 2023-10-10 RX ORDER — HYDROMORPHONE HYDROCHLORIDE 1 MG/ML
0.5 INJECTION, SOLUTION INTRAMUSCULAR; INTRAVENOUS; SUBCUTANEOUS
Status: DISCONTINUED | OUTPATIENT
Start: 2023-10-10 | End: 2023-10-14 | Stop reason: HOSPADM

## 2023-10-10 RX ORDER — POLYETHYLENE GLYCOL 3350 17 G/17G
17 POWDER, FOR SOLUTION ORAL DAILY
Status: DISCONTINUED | OUTPATIENT
Start: 2023-10-10 | End: 2023-10-14 | Stop reason: HOSPADM

## 2023-10-10 RX ORDER — OXYCODONE HYDROCHLORIDE 5 MG/1
5 TABLET ORAL
Status: DISCONTINUED | OUTPATIENT
Start: 2023-10-10 | End: 2023-10-10 | Stop reason: HOSPADM

## 2023-10-10 RX ORDER — SODIUM CHLORIDE 0.9 % (FLUSH) 0.9 %
5-40 SYRINGE (ML) INJECTION PRN
Status: DISCONTINUED | OUTPATIENT
Start: 2023-10-10 | End: 2023-10-14 | Stop reason: HOSPADM

## 2023-10-10 RX ORDER — PROPOFOL 10 MG/ML
INJECTION, EMULSION INTRAVENOUS PRN
Status: DISCONTINUED | OUTPATIENT
Start: 2023-10-10 | End: 2023-10-10 | Stop reason: SDUPTHER

## 2023-10-10 RX ORDER — SODIUM CHLORIDE 0.9 % (FLUSH) 0.9 %
5-40 SYRINGE (ML) INJECTION EVERY 12 HOURS SCHEDULED
Status: DISCONTINUED | OUTPATIENT
Start: 2023-10-10 | End: 2023-10-10 | Stop reason: HOSPADM

## 2023-10-10 RX ORDER — DIPHENHYDRAMINE HCL 25 MG
25 CAPSULE ORAL EVERY 6 HOURS PRN
Status: DISCONTINUED | OUTPATIENT
Start: 2023-10-10 | End: 2023-10-14 | Stop reason: HOSPADM

## 2023-10-10 RX ORDER — LORAZEPAM 0.5 MG/1
1 TABLET ORAL NIGHTLY
Status: DISCONTINUED | OUTPATIENT
Start: 2023-10-10 | End: 2023-10-14 | Stop reason: HOSPADM

## 2023-10-10 RX ORDER — LIDOCAINE HYDROCHLORIDE 20 MG/ML
INJECTION, SOLUTION EPIDURAL; INFILTRATION; INTRACAUDAL; PERINEURAL PRN
Status: DISCONTINUED | OUTPATIENT
Start: 2023-10-10 | End: 2023-10-10 | Stop reason: SDUPTHER

## 2023-10-10 RX ORDER — ACETAMINOPHEN 325 MG/1
650 TABLET ORAL EVERY 6 HOURS
Status: DISCONTINUED | OUTPATIENT
Start: 2023-10-10 | End: 2023-10-14 | Stop reason: HOSPADM

## 2023-10-10 RX ORDER — PROPRANOLOL HYDROCHLORIDE 10 MG/1
20 TABLET ORAL 3 TIMES DAILY
Status: DISCONTINUED | OUTPATIENT
Start: 2023-10-10 | End: 2023-10-14 | Stop reason: HOSPADM

## 2023-10-10 RX ORDER — PRIMIDONE 50 MG/1
150 TABLET ORAL 4 TIMES DAILY
Status: DISCONTINUED | OUTPATIENT
Start: 2023-10-10 | End: 2023-10-14 | Stop reason: HOSPADM

## 2023-10-10 RX ORDER — FENTANYL CITRATE 50 UG/ML
INJECTION, SOLUTION INTRAMUSCULAR; INTRAVENOUS PRN
Status: DISCONTINUED | OUTPATIENT
Start: 2023-10-10 | End: 2023-10-10 | Stop reason: SDUPTHER

## 2023-10-10 RX ADMIN — ESCITALOPRAM OXALATE 20 MG: 10 TABLET ORAL at 21:03

## 2023-10-10 RX ADMIN — Medication 10 MG: at 12:49

## 2023-10-10 RX ADMIN — HYDROMORPHONE HYDROCHLORIDE 0.5 MG: 2 INJECTION, SOLUTION INTRAMUSCULAR; INTRAVENOUS; SUBCUTANEOUS at 15:17

## 2023-10-10 RX ADMIN — ACETAMINOPHEN 650 MG: 325 TABLET ORAL at 21:29

## 2023-10-10 RX ADMIN — PRIMIDONE 150 MG: 50 TABLET ORAL at 21:02

## 2023-10-10 RX ADMIN — MOMETASONE FUROATE AND FORMOTEROL FUMARATE DIHYDRATE 2 PUFF: 200; 5 AEROSOL RESPIRATORY (INHALATION) at 20:38

## 2023-10-10 RX ADMIN — LAMOTRIGINE 150 MG: 100 TABLET ORAL at 21:03

## 2023-10-10 RX ADMIN — MIDAZOLAM 2 MG: 1 INJECTION INTRAMUSCULAR; INTRAVENOUS at 09:27

## 2023-10-10 RX ADMIN — CEFAZOLIN SODIUM 2000 MG: 100 INJECTION, POWDER, LYOPHILIZED, FOR SOLUTION INTRAVENOUS at 21:01

## 2023-10-10 RX ADMIN — PROCHLORPERAZINE EDISYLATE 5 MG: 5 INJECTION INTRAMUSCULAR; INTRAVENOUS at 14:53

## 2023-10-10 RX ADMIN — LIDOCAINE HYDROCHLORIDE 100 MG: 20 INJECTION, SOLUTION EPIDURAL; INFILTRATION; INTRACAUDAL; PERINEURAL at 11:15

## 2023-10-10 RX ADMIN — FENTANYL CITRATE 50 MCG: 50 INJECTION, SOLUTION INTRAMUSCULAR; INTRAVENOUS at 11:15

## 2023-10-10 RX ADMIN — SODIUM CHLORIDE, SODIUM LACTATE, POTASSIUM CHLORIDE, AND CALCIUM CHLORIDE: 600; 310; 30; 20 INJECTION, SOLUTION INTRAVENOUS at 08:41

## 2023-10-10 RX ADMIN — Medication 2000 MG: at 11:30

## 2023-10-10 RX ADMIN — SODIUM CHLORIDE, PRESERVATIVE FREE 5 ML: 5 INJECTION INTRAVENOUS at 21:08

## 2023-10-10 RX ADMIN — KETOROLAC TROMETHAMINE 30 MG: 30 INJECTION, SOLUTION INTRAMUSCULAR; INTRAVENOUS at 14:02

## 2023-10-10 RX ADMIN — GLYCOPYRROLATE 0.4 MG: 0.2 INJECTION INTRAMUSCULAR; INTRAVENOUS at 13:36

## 2023-10-10 RX ADMIN — SODIUM CHLORIDE, SODIUM LACTATE, POTASSIUM CHLORIDE, AND CALCIUM CHLORIDE: 600; 310; 30; 20 INJECTION, SOLUTION INTRAVENOUS at 12:49

## 2023-10-10 RX ADMIN — Medication 3 AMPULE: at 08:45

## 2023-10-10 RX ADMIN — HYDROMORPHONE HYDROCHLORIDE 0.5 MG: 2 INJECTION, SOLUTION INTRAMUSCULAR; INTRAVENOUS; SUBCUTANEOUS at 15:07

## 2023-10-10 RX ADMIN — SODIUM CHLORIDE: 9 INJECTION, SOLUTION INTRAVENOUS at 18:03

## 2023-10-10 RX ADMIN — POLYETHYLENE GLYCOL 3350 17 G: 17 POWDER, FOR SOLUTION ORAL at 17:33

## 2023-10-10 RX ADMIN — Medication 10 MG: at 12:51

## 2023-10-10 RX ADMIN — Medication 3 MG: at 13:36

## 2023-10-10 RX ADMIN — LORAZEPAM 1 MG: 0.5 TABLET ORAL at 21:03

## 2023-10-10 RX ADMIN — ROCURONIUM BROMIDE 50 MG: 50 INJECTION, SOLUTION INTRAVENOUS at 11:16

## 2023-10-10 RX ADMIN — ACETAMINOPHEN 650 MG: 325 TABLET ORAL at 17:33

## 2023-10-10 RX ADMIN — ONDANSETRON 4 MG: 2 INJECTION INTRAMUSCULAR; INTRAVENOUS at 12:50

## 2023-10-10 RX ADMIN — GABAPENTIN 200 MG: 100 CAPSULE ORAL at 17:33

## 2023-10-10 RX ADMIN — PROPOFOL 200 MG: 10 INJECTION, EMULSION INTRAVENOUS at 11:15

## 2023-10-10 RX ADMIN — FENTANYL CITRATE 50 MCG: 50 INJECTION, SOLUTION INTRAMUSCULAR; INTRAVENOUS at 11:47

## 2023-10-10 RX ADMIN — HYDROMORPHONE HYDROCHLORIDE 0.5 MG: 2 INJECTION, SOLUTION INTRAMUSCULAR; INTRAVENOUS; SUBCUTANEOUS at 14:51

## 2023-10-10 RX ADMIN — DEXAMETHASONE SODIUM PHOSPHATE 10 MG: 4 INJECTION, SOLUTION INTRAMUSCULAR; INTRAVENOUS at 12:46

## 2023-10-10 RX ADMIN — OXYCODONE HYDROCHLORIDE 10 MG: 5 TABLET ORAL at 17:32

## 2023-10-10 RX ADMIN — PROPRANOLOL HYDROCHLORIDE 20 MG: 10 TABLET ORAL at 21:02

## 2023-10-10 RX ADMIN — BISACODYL 5 MG: 5 TABLET, COATED ORAL at 17:32

## 2023-10-10 RX ADMIN — TRANEXAMIC ACID 1000 MG: 100 INJECTION, SOLUTION INTRAVENOUS at 11:37

## 2023-10-10 RX ADMIN — FLUTICASONE PROPIONATE 1 SPRAY: 50 SPRAY, METERED NASAL at 21:26

## 2023-10-10 ASSESSMENT — PAIN - FUNCTIONAL ASSESSMENT
PAIN_FUNCTIONAL_ASSESSMENT: 0-10

## 2023-10-10 ASSESSMENT — PAIN SCALES - GENERAL
PAINLEVEL_OUTOF10: 9
PAINLEVEL_OUTOF10: 0
PAINLEVEL_OUTOF10: 5

## 2023-10-10 ASSESSMENT — PAIN DESCRIPTION - LOCATION
LOCATION: BACK
LOCATION: INCISION

## 2023-10-10 ASSESSMENT — PAIN DESCRIPTION - ORIENTATION: ORIENTATION: LOWER;POSTERIOR

## 2023-10-10 ASSESSMENT — PAIN DESCRIPTION - PAIN TYPE: TYPE: SURGICAL PAIN

## 2023-10-10 ASSESSMENT — PAIN DESCRIPTION - ONSET: ONSET: GRADUAL

## 2023-10-10 ASSESSMENT — PAIN DESCRIPTION - FREQUENCY: FREQUENCY: INTERMITTENT

## 2023-10-10 NOTE — ANESTHESIA PRE PROCEDURE
Department of Anesthesiology  Preprocedure Note       Name:  Thierry Mcdonnell   Age:  79 y.o.  :  1955                                          MRN:  [id number]         Date:  10/10/2023      Surgeon: Cecilia Willett): Shellie Vasquez MD    Procedure: Procedure(s):  L2-S1 laminectomy and L4-S1 fusion with allograft, transforaminal lumbar interbody fusion, and instrumentation. Medications prior to admission:   Prior to Admission medications    Medication Sig Start Date End Date Taking? Authorizing Provider   fluticasone (FLONASE) 50 MCG/ACT nasal spray 1 spray by Each Nostril route in the morning and at bedtime   Yes Tiffany Beltran MD   venlafaxine (EFFEXOR) 75 MG tablet Take 3 tablets by mouth daily Pt takes 3 tablets daily   Yes Tiffany Beltran MD   Multiple Vitamins-Minerals (CENTRUM ADULTS PO) Take 1 tablet by mouth daily   Yes Tiffany Beltran MD   gabapentin (NEURONTIN) 100 MG capsule Take 2 capsules by mouth every evening. Yes Tiffany Beltran MD   Lumateperone Tosylate (CAPLYTA) 42 MG capsule Take 1 capsule by mouth nightly   Yes Tiffany Beltran MD   fluticasone-salmeterol (ADVAIR) 250-50 MCG/ACT AEPB diskus inhaler Inhale 1 puff into the lungs in the morning and 1 puff in the evening.  10/5/23   Darshana Titus DO   ibuprofen (ADVIL;MOTRIN) 800 MG tablet Take 1 tablet by mouth every 8 hours as needed 3/21/23   Tiffany Beltran MD   acetaminophen (TYLENOL) 500 MG tablet Take 2 tablets by mouth as needed    Tiffany Beltran MD   propranolol (INDERAL) 20 MG tablet Take 1 tablet by mouth 3 times daily    Tiffany Beltran MD   escitalopram (LEXAPRO) 20 MG tablet Take 1 tablet by mouth nightly    Tiffany Beltran MD   primidone (MYSOLINE) 50 MG tablet Take 3 tablets by mouth 4 times daily 23   Blanka Mckeon MD   albuterol sulfate HFA (VENTOLIN HFA) 108 (90 Base) MCG/ACT inhaler Inhale 2 puffs into the lungs 4 times daily as needed

## 2023-10-10 NOTE — H&P
at L2-L3, and L4-L5. She also has severe left-sided L5 foraminal stenosis that correlates with her current radicular symptoms. Diagnosis:         ICD-10-CM     1. Spinal stenosis of lumbar region with neurogenic claudication  M48.062         2. Spondylolisthesis of lumbar region  M43.16         3. Lumbar radiculopathy  M54.16         4. Hypertrophy of ligamentum flavum  M24.28               Assessment/Plan: This patient's clinical history and physical exam is consistent with neurogenic claudication due to lumbar spondylolisthesis and stenosis. The imaging studies are concordant with the patient's symptoms. Conservative efforts have been reasonably exhausted and the patient feels like she cannot go on with the symptoms as they are. We have discussed surgical options as follows: We discussed the details of surgery including a midline incision in over the low back followed by dissection to the area of stenosis. The nerves would be freed up by trimming any impinging structures including ligaments and bone. Then any segments that are deemed to be unstable will be fused together with cadaver bone and screws and rods will supplement the fusion. A drain may be inserted and the wound would be closed with suture and covered with sterile dressings. The patient would expect to stay in the hospital 1-2 days or until she can get about safely with minimal assistance. A short stay in a rehabilitation facility could also be considered depending on how quickly she recovers. Follow-up would be scheduled for 2-3 weeks and she would have restrictions including no driving, and no lifting greater than 15 lbs until follow up with me. She was encouraged to walk as much as possible before and after the operation to facilitate an expeditious recovery.   We also discussed the potential risks of the surgery including, but not limited to infection, spinal fluid leak and potential headaches requiring her to remain supine post-operatively; injury to the cauda equina or peripheral nerve root resulting in weakness, numbness, or very rarely bowel or bladder dysfunction; persistent back or leg symptoms, recurrence of stenosis or the development of instability or hardware failure possibly needing additional surgery;  blood loss needing transfusion; postoperative hematoma; and the risks of anesthesia. The patient voiced an understanding of these issues as outlined. The procedure that may prove to be beneficial here is a L2-S1 laminectomy and L4-S1 fusion with allograft, transforaminal lumbar interbody fusion, and instrumentation.

## 2023-10-10 NOTE — OP NOTE
[address] [place]. [address]   [phone]    OPERATIVE REPORT    Patient Reji Cabrera  [id number]  1955  79 y.o. DATE OF SURGERY: 10/10/2023    SURGEON: Antonio Castañeda MD    PREOP DIAGNOSIS:     1. Lumbar spondylolisthesis   2. Lumbar stenosis     POSTOP DIAGNOSIS:     1. Lumbar spondylolisthesis   2. Lumbar stenosis     PROCEDURE:  1. Posterolateral and transforaminal lumbar interbody fusion L4-L5 and L5-S1 including laminectomy at L4, L5, and S1 for stenosis (CPT 94601, 56406, 41876, 68389 X 2)  2. Insertion biomechanical device L4-L5 and L5-S1 (CPT 22853 X 2)  3. Instrumentation  L2 through S1 . (CPT U2181796)  4. Allograft (CPT 31781)  5. Lumbar laminectomy L2 and L3 with foraminotomies. (CPT C9701380, J2277178)  6. Posterolateral fusion without interbody fusion L2-3 and L3-4 (CPT 22614 X 2)     ANESTHESIA: General    ESTIMATED BLOOD LOSS:  100 ml    INTRAOPERATIVE COMPLICATIONS: None. POSTOP CONDITION: Stable. IMPLANTS:   Implant Name Type Inv.  Item Serial No.  Lot No. LRB No. Used Action   ALLOGRAFT BNE CHIP 1-4 MM 15 CC CRUSH CANC - [id number]  ALLOGRAFT BNE CHIP 1-4 MM 15 CC CRUSH CANC [id number] [employer] [facility]  N/A 1 Implanted   PUTTY BIO DBM 10 ML W CHIPS - [id number]  PUTTY BIO DBM 10 ML W CHIPS  [employer] [facility] [id number] N/A 1 Implanted   PUTTY BIO DBM 10 ML W CHIPS - [id number]  PUTTY BIO DBM 10 ML W CHIPS  [employer] [facility] [id number] N/A 1 Implanted   GRAFT BNE LG - [id number]  GRAFT BNE LG [id number] BIOLOG[employer]  N/A 1 Implanted   SPACER SPNL 15 DEG SM 28X10 MM STRL PROLIFT - [id number]  SPACER SPNL 15 DEG SM 28X10 MM STRL PROLIFT  [employer] [facility] [id number] N/A 1 Implanted   GRAFT DURA [id number] REABSORBABLE MTRX SUB FOR SFT TISS REP - [id number]  GRAFT DURA [id number] REABSORBABLE MTRX SUB FOR SFT TISS REP  [employer] TING-WD [id number] N/A 1 Implanted   SPACER SPNL 15 DEG SM 28X10 MM STRL PROLIFT - [id number]  SPACER SPNL 15 DEG SM 28X10 MM STRL PROLIFT  [employer] HOWM-WD [id number] N/A 1 Implanted   BLOCKER SPNL L50MM DIA6MM TI 1 LEV KASSY 3 - [id number]  BLOCKER SPNL L50MM DIA6MM TI 1 LEV KASSY 3  [employer] HOWM-WD [id number] N/A 10 Implanted   SCREW SPNL L50MM DIA6. 5MM POLYAX KAPADIA - [id number]  SCREW SPNL L50MM DIA6. 5MM POLYAX KAPAIDA  [employer] HOWM[employer]WD [id number] N/A 7 Implanted   SCREW SPNL L45MM DIA6. 5MM POLYAX KAPADIA - [id number]  SCREW SPNL L45MM DIA6. 5MM POLYAX KAPADIA  [employer] HOWM[employer]WD [id number] N/A 3 Implanted   ALEJANDRA SPNL L110MM DIA6MM ANT POST TI SMOOTH CRV KASSY III - [id number]  ALEJANDRA SPNL L110MM DIA6MM ANT POST TI SMOOTH CRV KASSY III  [employer] HOWM-WD [id number] N/A 2 Implanted       INDICATIONS FOR PROCEDURE: Patient has had low back pain with radiation to the buttocks and lower extremities for an extended period of time. The symptoms and exam findings were felt to be consistent with neurogenic claudication. The preoperative radiographs and other imaging confirmed showed spondylolisthesis and stenosis. Conservative measures have been exhausted as outlined in the H&P. The symptoms progressed to the point where there is difficulty performing any task that requires prolonged standing or walking which interfered with activities of daily living and ability to enjoy life. In the outpatient setting the risks, benefits and potential complications of the above-listed procedure were discussed with her and an informed consent was obtained. DESCRIPTION OF PROCEDURE: After adequate induction of general anesthesia, the patient was positioned prone on the [facility] spinal table. Care was taken to pad all bony prominences. The shoulders and elbows were placed in the 90/90 position. The abdomen was allowed to hang free to decrease intraabdominal and venous pressure. The lumbar area was prepped and draped in the usual sterile fashion.  Preoperative antibiotics were

## 2023-10-10 NOTE — PROGRESS NOTES
TRANSFER - IN REPORT:    Verbal report received from VADIM Davila on Diane Ellison  being received from PACU for routine progression of patient care      Report consisted of patient's Situation, Background, Assessment and   Recommendations(SBAR). Information from the following report(s) Nurse Handoff Report was reviewed with the receiving nurse. Opportunity for questions and clarification was provided. Assessment completed upon patient's arrival to unit and care assumed.

## 2023-10-10 NOTE — ANESTHESIA PROCEDURE NOTES
Airway  Date/Time: 10/10/2023 11:19 AM  Urgency: elective    Airway not difficult    General Information and Staff    Patient location during procedure: OR  Resident/CRNA: ROCKY Brown CRNA  Performed: resident/CRNA   Performed by: ROCKY Brown CRNA  Authorized by: Tiffany Wang MD      Indications and Patient Condition  Indications for airway management: anesthesia  Spontaneous Ventilation: absent  Sedation level: deep  Preoxygenated: yes  Patient position: sniffing  MILS not maintained throughout  Mask difficulty assessment: vent by bag mask    Final Airway Details  Final airway type: endotracheal airway      Successful airway: ETT  Cuffed: yes   Successful intubation technique: direct laryngoscopy  Facilitating devices/methods: intubating stylet  Endotracheal tube insertion site: oral  Blade: Toshia  Blade size: #3  ETT size (mm): 7.0  Cormack-Lehane Classification: grade I - full view of glottis  Placement verified by: chest auscultation and capnometry   Measured from: lips  Number of attempts at approach: 1  Ventilation between attempts: bag mask

## 2023-10-11 PROCEDURE — 94640 AIRWAY INHALATION TREATMENT: CPT

## 2023-10-11 PROCEDURE — 97530 THERAPEUTIC ACTIVITIES: CPT

## 2023-10-11 PROCEDURE — 2500000003 HC RX 250 WO HCPCS: Performed by: ORTHOPAEDIC SURGERY

## 2023-10-11 PROCEDURE — 6370000000 HC RX 637 (ALT 250 FOR IP): Performed by: ORTHOPAEDIC SURGERY

## 2023-10-11 PROCEDURE — 2580000003 HC RX 258: Performed by: ORTHOPAEDIC SURGERY

## 2023-10-11 PROCEDURE — 97165 OT EVAL LOW COMPLEX 30 MIN: CPT

## 2023-10-11 PROCEDURE — 94760 N-INVAS EAR/PLS OXIMETRY 1: CPT

## 2023-10-11 PROCEDURE — 51701 INSERT BLADDER CATHETER: CPT

## 2023-10-11 PROCEDURE — 51798 US URINE CAPACITY MEASURE: CPT

## 2023-10-11 PROCEDURE — 97535 SELF CARE MNGMENT TRAINING: CPT

## 2023-10-11 PROCEDURE — 1100000000 HC RM PRIVATE

## 2023-10-11 PROCEDURE — 94664 DEMO&/EVAL PT USE INHALER: CPT

## 2023-10-11 PROCEDURE — 6360000002 HC RX W HCPCS: Performed by: ORTHOPAEDIC SURGERY

## 2023-10-11 PROCEDURE — 97162 PT EVAL MOD COMPLEX 30 MIN: CPT

## 2023-10-11 RX ORDER — KETOROLAC TROMETHAMINE 15 MG/ML
15 INJECTION, SOLUTION INTRAMUSCULAR; INTRAVENOUS EVERY 6 HOURS
Status: COMPLETED | OUTPATIENT
Start: 2023-10-11 | End: 2023-10-12

## 2023-10-11 RX ADMIN — LORAZEPAM 1 MG: 0.5 TABLET ORAL at 20:55

## 2023-10-11 RX ADMIN — PRIMIDONE 150 MG: 50 TABLET ORAL at 16:34

## 2023-10-11 RX ADMIN — TUBERCULIN PURIFIED PROTEIN DERIVATIVE 5 UNITS: 5 INJECTION, SOLUTION INTRADERMAL at 16:26

## 2023-10-11 RX ADMIN — MOMETASONE FUROATE AND FORMOTEROL FUMARATE DIHYDRATE 2 PUFF: 200; 5 AEROSOL RESPIRATORY (INHALATION) at 19:35

## 2023-10-11 RX ADMIN — FLUTICASONE PROPIONATE 1 SPRAY: 50 SPRAY, METERED NASAL at 07:57

## 2023-10-11 RX ADMIN — MOMETASONE FUROATE AND FORMOTEROL FUMARATE DIHYDRATE 2 PUFF: 200; 5 AEROSOL RESPIRATORY (INHALATION) at 09:28

## 2023-10-11 RX ADMIN — PROPRANOLOL HYDROCHLORIDE 20 MG: 10 TABLET ORAL at 13:03

## 2023-10-11 RX ADMIN — PRIMIDONE 150 MG: 50 TABLET ORAL at 07:49

## 2023-10-11 RX ADMIN — ACETAMINOPHEN 650 MG: 325 TABLET ORAL at 16:34

## 2023-10-11 RX ADMIN — POLYETHYLENE GLYCOL 3350 17 G: 17 POWDER, FOR SOLUTION ORAL at 07:49

## 2023-10-11 RX ADMIN — SODIUM CHLORIDE: 9 INJECTION, SOLUTION INTRAVENOUS at 03:51

## 2023-10-11 RX ADMIN — PRIMIDONE 150 MG: 50 TABLET ORAL at 11:59

## 2023-10-11 RX ADMIN — OXYCODONE HYDROCHLORIDE 10 MG: 5 TABLET ORAL at 11:59

## 2023-10-11 RX ADMIN — CEFAZOLIN SODIUM 2000 MG: 100 INJECTION, POWDER, LYOPHILIZED, FOR SOLUTION INTRAVENOUS at 05:10

## 2023-10-11 RX ADMIN — FLUTICASONE PROPIONATE 1 SPRAY: 50 SPRAY, METERED NASAL at 21:16

## 2023-10-11 RX ADMIN — PROPRANOLOL HYDROCHLORIDE 20 MG: 10 TABLET ORAL at 16:35

## 2023-10-11 RX ADMIN — SODIUM CHLORIDE, PRESERVATIVE FREE 5 ML: 5 INJECTION INTRAVENOUS at 08:00

## 2023-10-11 RX ADMIN — BISACODYL 5 MG: 5 TABLET, COATED ORAL at 07:49

## 2023-10-11 RX ADMIN — HYDROMORPHONE HYDROCHLORIDE 0.5 MG: 1 INJECTION, SOLUTION INTRAMUSCULAR; INTRAVENOUS; SUBCUTANEOUS at 15:02

## 2023-10-11 RX ADMIN — ACETAMINOPHEN 650 MG: 325 TABLET ORAL at 21:21

## 2023-10-11 RX ADMIN — KETOROLAC TROMETHAMINE 15 MG: 15 INJECTION, SOLUTION INTRAMUSCULAR; INTRAVENOUS at 16:35

## 2023-10-11 RX ADMIN — GABAPENTIN 200 MG: 100 CAPSULE ORAL at 20:55

## 2023-10-11 RX ADMIN — ESCITALOPRAM OXALATE 20 MG: 10 TABLET ORAL at 20:55

## 2023-10-11 RX ADMIN — SODIUM CHLORIDE: 9 INJECTION, SOLUTION INTRAVENOUS at 14:29

## 2023-10-11 RX ADMIN — ACETAMINOPHEN 650 MG: 325 TABLET ORAL at 11:58

## 2023-10-11 RX ADMIN — PROPRANOLOL HYDROCHLORIDE 20 MG: 10 TABLET ORAL at 07:49

## 2023-10-11 RX ADMIN — PRIMIDONE 150 MG: 50 TABLET ORAL at 20:55

## 2023-10-11 RX ADMIN — VENLAFAXINE HYDROCHLORIDE 225 MG: 75 CAPSULE, EXTENDED RELEASE ORAL at 07:48

## 2023-10-11 RX ADMIN — KETOROLAC TROMETHAMINE 15 MG: 15 INJECTION, SOLUTION INTRAMUSCULAR; INTRAVENOUS at 21:21

## 2023-10-11 RX ADMIN — ACETAMINOPHEN 650 MG: 325 TABLET ORAL at 05:11

## 2023-10-11 RX ADMIN — LAMOTRIGINE 150 MG: 100 TABLET ORAL at 20:55

## 2023-10-11 RX ADMIN — OXYCODONE HYDROCHLORIDE 5 MG: 5 TABLET ORAL at 07:49

## 2023-10-11 ASSESSMENT — PAIN DESCRIPTION - PAIN TYPE
TYPE: SURGICAL PAIN
TYPE: SURGICAL PAIN

## 2023-10-11 ASSESSMENT — PAIN SCALES - GENERAL
PAINLEVEL_OUTOF10: 0
PAINLEVEL_OUTOF10: 10
PAINLEVEL_OUTOF10: 4
PAINLEVEL_OUTOF10: 0

## 2023-10-11 ASSESSMENT — PAIN DESCRIPTION - ORIENTATION
ORIENTATION: LOWER;POSTERIOR
ORIENTATION: LOWER;POSTERIOR

## 2023-10-11 ASSESSMENT — PAIN DESCRIPTION - ONSET
ONSET: GRADUAL
ONSET: GRADUAL

## 2023-10-11 ASSESSMENT — PAIN DESCRIPTION - LOCATION
LOCATION: BACK
LOCATION: BACK

## 2023-10-11 ASSESSMENT — PAIN DESCRIPTION - FREQUENCY
FREQUENCY: INTERMITTENT
FREQUENCY: INTERMITTENT

## 2023-10-11 ASSESSMENT — PAIN DESCRIPTION - DESCRIPTORS
DESCRIPTORS: ACHING;DISCOMFORT
DESCRIPTORS: DISCOMFORT;SORE

## 2023-10-11 NOTE — PROGRESS NOTES
PT has not voided since surgery 10/10 and was strait cathed once overnight. RN bladder scan at 11:12am showed 432mL. Attending notified. 14:16 pt voided 150 mL into hat and lost unmeasurable amount into toilet. Post void residual 145 mL @14:25. Per verbal order from Dr. Teran Left scan per order and if over 400mL strait cath again.

## 2023-10-11 NOTE — PROGRESS NOTES
ACUTE PHYSICAL THERAPY GOALS:   (Developed with and agreed upon by patient and/or caregiver.)    (1.) Turner Carlton  will move from supine to sit and sit to supine , scoot up and down, and roll side to side with STAND BY ASSIST within 7 treatment day(s). (2.) Turner Carlton will transfer from bed to chair and chair to bed with STAND BY ASSIST using the least restrictive device within 7 treatment day(s). (3.) Turner Carlton will ambulate with STAND BY ASSIST for 150 feet with the least restrictive device within 7 treatment day(s). (4.) Turner Carlton will perform standing static and dynamic balance activities x 8 minutes with STAND BY ASSIST to improve safety within 7 treatment day(s). (5.) Turner Carlton will ascend and descend 2 stairs using 1 hand rail(s) with STAND BY ASSIST to improve functional mobility and safety within 7 treatment day(s). (6.) Turner Carlton will perform therapeutic exercises x 15 min for HEP with SUPERVISION to improve strength, endurance, and functional mobility within 7 treatment day(s). PHYSICAL THERAPY: Daily Note PM   (Link to Caseload Tracking: PT Visit Days : 1  Time In/Out PT Charge Capture  Rehab Caseload Tracker  Orders    Turner Carlton is a 79 y.o. 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  (N/A)  1 Day Post-Op  Inpatient: Payor: Manju Kaufman / Plan: MEDICARE PART A AND B / Product Type: *No Product type* /     ASSESSMENT:     REHAB RECOMMENDATIONS:   Recommendation to date pending progress:  Setting:  Short-term Rehab    Equipment:    To Be Scooting [] [] [] [] [] [x] [] [] [] [] []    Sit to Supine [] [] [] [] [] [] [] [] [] [x] []    Transfers    Sit to Stand [] [] [] [] [] [x] [] [] [] [] [x]    Bed to Chair [] [] [] [] [] [x] [] [] [] [] [x]    Stand to Sit [] [] [] [] [] [x] [] [] [] [] []     [] [] [] [] [] [] [] [] [] [] []    I=Independent, Mod I=Modified Independent, S=Supervision, SBA=Standby Assistance, CGA=Contact Guard Assistance,   Min=Minimal Assistance, Mod=Moderate Assistance, Max=Maximal Assistance, Total=Total Assistance, NT=Not Tested    BALANCE: Good Fair+ Fair Fair- Poor NT Comments   Sitting Static [] [x] [] [] [] []    Sitting Dynamic [] [x] [] [] [] []              Standing Static [] [] [] [x] [] []    Standing Dynamic [] [] [] [x] [] []      GAIT: I Mod I S SBA CGA Min Mod Max Total  NT x2 Comments:   Level of Assistance [] [] [] [] [x] [x] [] [] [] [] []    Distance   3 feet    DME Rolling Walker    Gait Quality Decreased step length and Decreased stance    Weightbearing Status      Stairs      I=Independent, Mod I=Modified Independent, S=Supervision, SBA=Standby Assistance, CGA=Contact Guard Assistance,   Min=Minimal Assistance, Mod=Moderate Assistance, Max=Maximal Assistance, Total=Total Assistance, NT=Not Tested    PLAN:   FREQUENCY AND DURATION: BID for duration of hospital stay or until stated goals are met, whichever comes first.    TREATMENT:   TREATMENT:   Co-Treatment PT/OT necessary due to patient's decreased overall endurance/tolerance levels, as well as need for high level skilled assistance to complete functional transfers/mobility and functional tasks  Therapeutic Activity (23 Minutes): Therapeutic activity included Scooting, Transfer Training, Ambulation on level ground, Sitting balance , and Standing balance to improve functional Activity tolerance, Balance, Mobility, and Strength.     TREATMENT GRID:  N/A    AFTER TREATMENT PRECAUTIONS: Bed/Chair Locked, Call light within reach, Chair, Needs within reach, RN

## 2023-10-11 NOTE — PROGRESS NOTES
ACUTE PHYSICAL THERAPY GOALS:   (Developed with and agreed upon by patient and/or caregiver.)    (1.) Turner Carlton  will move from supine to sit and sit to supine , scoot up and down, and roll side to side with STAND BY ASSIST within 7 treatment day(s). (2.) Turner Carlton will transfer from bed to chair and chair to bed with STAND BY ASSIST using the least restrictive device within 7 treatment day(s). (3.) Turner Carlton will ambulate with STAND BY ASSIST for 150 feet with the least restrictive device within 7 treatment day(s). (4.) Turner Carlton will perform standing static and dynamic balance activities x 8 minutes with STAND BY ASSIST to improve safety within 7 treatment day(s). (5.) Turner Carlton will ascend and descend 2 stairs using 1 hand rail(s) with STAND BY ASSIST to improve functional mobility and safety within 7 treatment day(s). (6.) Turner Carlton will perform therapeutic exercises x 15 min for HEP with SUPERVISION to improve strength, endurance, and functional mobility within 7 treatment day(s). PHYSICAL THERAPY Initial Assessment, Daily Note, and AM  (Link to Caseload Tracking: PT Visit Days : 1  Acknowledge Orders  Time In/Out  PT Charge Capture  Rehab Caseload Tracker    Turner Carlton is a 79 y.o. 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  (N/A)  1 Day Post-Op  Reason for Referral: Generalized Muscle Weakness (M62.81)  Difficulty in walking, Not elsewhere classified (R26.2)  Inpatient: Payor: MEDICARE / Plan: MEDICARE PART A AND B / Product Type: *No Product much help is needed climbing 3-5 steps with a railing?: A Lot  AM-PAC Inpatient Mobility Raw Score : 14  AM-PAC Inpatient T-Scale Score : 38.1  Mobility Inpatient CMS 0-100% Score: 61.29  Mobility Inpatient CMS G-Code Modifier : CL    SUBJECTIVE:   Ms. Barry Gardner states, \"Im sorry I couldn't do much\"     Social/Functional Lives With: Spouse  Type of Home: House  Home Layout: Two level, Able to Live on Main level with bedroom/bathroom  Home Access: Stairs to enter with rails  Entrance Stairs - Number of Steps: 2  Entrance Stairs - Rails: Left  Bathroom Shower/Tub: Walk-in shower, Shower chair with back  Home Equipment: Walker, rolling    OBJECTIVE:     PAIN: Savi Ayaz / O2: Skylar Betts / Doug Kidd / Lorri Armstrong:   Pre Treatment: 2/10         Post Treatment: 5/10 Vitals: /51 in sitting, 95/54 following standing attempt, 128/57 following rest in supine        Oxygen      IV    RESTRICTIONS/PRECAUTIONS:           Spinal Precautions: No Bending, No Lifting, No Twisting        GROSS EVALUATION:  Intact Impaired (Comments):   AROM [x]     PROM [x]    Strength []  Grossly 4/5 in BLE   Balance [] Sitting - Static: Good  Sitting - Dynamic: Good, -  Standing - Static: Fair, -   Posture [] WFL   Sensation []     Coordination []      Tone []     Edema []    Activity Tolerance []  Limited by hypotension, fatigue    []      COGNITION/  PERCEPTION: Intact Impaired (Comments):   Orientation [x]     Vision [x]     Hearing [x]     Cognition  [x]       MOBILITY: I Mod I S SBA CGA Min Mod Max Total  NT x2 Comments:   Bed Mobility    Rolling [] [] [] [] [] [x] [] [] [] [] []    Supine to Sit [] [] [] [] [] [x] [] [] [] [] []    Scooting [] [] [] [] [] [x] [] [] [] [] []    Sit to Supine [] [] [] [] [] [x] [] [] [] [] []    Transfers    Sit to Stand [] [] [] [] [] [] [x] [] [] [] []    Bed to Chair [] [] [] [] [] [] [] [] [] [] []    Stand to Sit [] [] [] [] [] [] [] [] [] [] []     [] [] [] [] [] [] [] [] [] [] []    I=Independent, Mod I=Modified Independent, S=Supervision, SBA=Standby Assistance, CGA=Contact Guard Assistance,   Min=Minimal Assistance, Mod=Moderate Assistance, Max=Maximal Assistance, Total=Total Assistance, NT=Not Tested    GAIT: I Mod I S SBA CGA Min Mod Max Total  NT x2 Comments:   Level of Assistance [] [] [] [] [] [] [] [] [] [x] []    Distance   feet    DME N/A    Gait Quality N/A    Weightbearing Status      Stairs      I=Independent, Mod I=Modified Independent, S=Supervision, SBA=Standby Assistance, CGA=Contact Guard Assistance,   Min=Minimal Assistance, Mod=Moderate Assistance, Max=Maximal Assistance, Total=Total Assistance, NT=Not Tested    PLAN:   FREQUENCY AND DURATION: BID for duration of hospital stay or until stated goals are met, whichever comes first.    THERAPY PROGNOSIS: Good    PROBLEM LIST:   (Skilled intervention is medically necessary to address:)  Decreased Activity Tolerance  Decreased Balance  Decreased Coordination  Decreased Gait Ability  Decreased Safety Awareness  Decreased Strength  Decreased Transfer Abilities  Increased Pain INTERVENTIONS PLANNED:   (Benefits and precautions of physical therapy have been discussed with the patient.)  Therapeutic Activity  Therapeutic Exercise/HEP  Neuromuscular Re-education  Gait Training  Education       TREATMENT:   EVALUATION: MODERATE COMPLEXITY: (Untimed Charge)    TREATMENT:   Therapeutic Activity (25 Minutes): Therapeutic activity included Rolling, Supine to Sit, Sit to Supine, Scooting, and Sitting balance  to improve functional Activity tolerance, Balance, Mobility, and Strength.     TREATMENT GRID:  N/A    AFTER TREATMENT PRECAUTIONS: Bed, Bed/Chair Locked, Call light within reach, Needs within reach, and RN notified    INTERDISCIPLINARY COLLABORATION:  RN/ PCT and PT/ PTA    EDUCATION: Education Given To: Patient  Education Provided: Role of Therapy;Plan of Care;Precautions  Education Method: Verbal;Demonstration  Barriers to Learning: None  Education Outcome:

## 2023-10-11 NOTE — PROGRESS NOTES
ACUTE OCCUPATIONAL THERAPY GOALS:   (Developed with and agreed upon by patient and/or caregiver.)  1. Patient will verbalize and demonstrate understanding of spinal precautions with 100% accuracy during ADLs. 2. Patient will complete lower body bathing and dressing with INDEPENDENCE and adaptive equipment as needed. 3. Patient will complete functional transfers with INDEPENDENCE and adaptive equipment as needed. 4. Patient will complete toileting and toilet transfer with INDEPENDENCE.   5. Patient will complete functional mobility of household distances with INDEPENDENCE and adaptive equipment as needed. 6. Patient will demonstrate ability to log roll in bed with INDEPENDENCE and NO verbal cues from therapist.     Timeframe: 7 visits      OCCUPATIONAL THERAPY Initial Assessment and Daily Note       OT Visit Days: 1  Acknowledge Orders  Time  OT Charge Capture  Rehab Caseload Tracker    Spinal precautions (no bending, lifting, or twisting)    David Walter is a 79 y.o. 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 (N/A)  1 Day Post-Op  Reason for Referral: Generalized Muscle Weakness (M62.81)  Other Low Back Pain (M54.59)  Inpatient: Payor: MEDICARE / Plan: MEDICARE PART A AND B / Product Type: *No Product type* /     ASSESSMENT:     REHAB RECOMMENDATIONS:   Recommendation to date pending progress:  Setting:  Short-term Rehab    Equipment:    Rolling Walker     ASSESSMENT:  Ms. Braulio Olsen is a 78 y/o female who presents POD#1 s/p L2-S1 laminectomy and fusion.  At baseline pt lives with her , is independent with ADLs and does not use AD assistance required, increase activity tolerance, increase safety awareness, and maintain precautions. TREATMENT GRID:  N/A    AFTER TREATMENT PRECAUTIONS: Bed/Chair Locked, Call light within reach, Chair, Heels floated, Needs within reach, RN notified, and Visitors at bedside    INTERDISCIPLINARY COLLABORATION:  MD/ PA/ NP , RN/ PCT, and PT/ PTA    EDUCATION:  Education Given To: Patient; Family  Education Provided: Role of Therapy;Plan of Care;Precautions  Education Method: Verbal  Barriers to Learning: None  Education Outcome: Verbalized understanding    TOTAL TREATMENT DURATION AND TIME:  Time In: 4955  Time Out: [address]  Minutes: 1945 [address]

## 2023-10-12 LAB
MM INDURATION, POC: 0 MM (ref 0–5)
PPD, POC: NEGATIVE

## 2023-10-12 PROCEDURE — 2580000003 HC RX 258: Performed by: ORTHOPAEDIC SURGERY

## 2023-10-12 PROCEDURE — 97112 NEUROMUSCULAR REEDUCATION: CPT

## 2023-10-12 PROCEDURE — 51798 US URINE CAPACITY MEASURE: CPT

## 2023-10-12 PROCEDURE — 94761 N-INVAS EAR/PLS OXIMETRY MLT: CPT

## 2023-10-12 PROCEDURE — 6360000002 HC RX W HCPCS: Performed by: ORTHOPAEDIC SURGERY

## 2023-10-12 PROCEDURE — 51701 INSERT BLADDER CATHETER: CPT

## 2023-10-12 PROCEDURE — 2500000003 HC RX 250 WO HCPCS: Performed by: ORTHOPAEDIC SURGERY

## 2023-10-12 PROCEDURE — 97530 THERAPEUTIC ACTIVITIES: CPT

## 2023-10-12 PROCEDURE — 1100000000 HC RM PRIVATE

## 2023-10-12 PROCEDURE — 94640 AIRWAY INHALATION TREATMENT: CPT

## 2023-10-12 PROCEDURE — 6370000000 HC RX 637 (ALT 250 FOR IP): Performed by: ORTHOPAEDIC SURGERY

## 2023-10-12 PROCEDURE — 97535 SELF CARE MNGMENT TRAINING: CPT

## 2023-10-12 PROCEDURE — 94760 N-INVAS EAR/PLS OXIMETRY 1: CPT

## 2023-10-12 RX ADMIN — LAMOTRIGINE 150 MG: 100 TABLET ORAL at 20:45

## 2023-10-12 RX ADMIN — HYDROMORPHONE HYDROCHLORIDE 0.5 MG: 1 INJECTION, SOLUTION INTRAMUSCULAR; INTRAVENOUS; SUBCUTANEOUS at 11:31

## 2023-10-12 RX ADMIN — PRIMIDONE 150 MG: 50 TABLET ORAL at 21:00

## 2023-10-12 RX ADMIN — FLUTICASONE PROPIONATE 1 SPRAY: 50 SPRAY, METERED NASAL at 08:58

## 2023-10-12 RX ADMIN — ACETAMINOPHEN 650 MG: 325 TABLET ORAL at 21:46

## 2023-10-12 RX ADMIN — GABAPENTIN 200 MG: 100 CAPSULE ORAL at 20:45

## 2023-10-12 RX ADMIN — KETOROLAC TROMETHAMINE 15 MG: 15 INJECTION, SOLUTION INTRAMUSCULAR; INTRAVENOUS at 09:04

## 2023-10-12 RX ADMIN — LORAZEPAM 1 MG: 0.5 TABLET ORAL at 20:46

## 2023-10-12 RX ADMIN — BISACODYL 5 MG: 5 TABLET, COATED ORAL at 08:57

## 2023-10-12 RX ADMIN — PRIMIDONE 150 MG: 50 TABLET ORAL at 17:24

## 2023-10-12 RX ADMIN — POLYETHYLENE GLYCOL 3350 17 G: 17 POWDER, FOR SOLUTION ORAL at 08:58

## 2023-10-12 RX ADMIN — PROPRANOLOL HYDROCHLORIDE 20 MG: 10 TABLET ORAL at 11:30

## 2023-10-12 RX ADMIN — MOMETASONE FUROATE AND FORMOTEROL FUMARATE DIHYDRATE 2 PUFF: 200; 5 AEROSOL RESPIRATORY (INHALATION) at 08:10

## 2023-10-12 RX ADMIN — FLUTICASONE PROPIONATE 1 SPRAY: 50 SPRAY, METERED NASAL at 20:48

## 2023-10-12 RX ADMIN — VENLAFAXINE HYDROCHLORIDE 225 MG: 75 CAPSULE, EXTENDED RELEASE ORAL at 08:57

## 2023-10-12 RX ADMIN — ESCITALOPRAM OXALATE 20 MG: 10 TABLET ORAL at 20:45

## 2023-10-12 RX ADMIN — SODIUM CHLORIDE, PRESERVATIVE FREE 5 ML: 5 INJECTION INTRAVENOUS at 10:19

## 2023-10-12 RX ADMIN — PROPRANOLOL HYDROCHLORIDE 20 MG: 10 TABLET ORAL at 17:24

## 2023-10-12 RX ADMIN — PRIMIDONE 150 MG: 50 TABLET ORAL at 08:56

## 2023-10-12 RX ADMIN — KETOROLAC TROMETHAMINE 15 MG: 15 INJECTION, SOLUTION INTRAMUSCULAR; INTRAVENOUS at 03:47

## 2023-10-12 RX ADMIN — MOMETASONE FUROATE AND FORMOTEROL FUMARATE DIHYDRATE 2 PUFF: 200; 5 AEROSOL RESPIRATORY (INHALATION) at 20:29

## 2023-10-12 RX ADMIN — SODIUM CHLORIDE, PRESERVATIVE FREE 5 ML: 5 INJECTION INTRAVENOUS at 20:48

## 2023-10-12 RX ADMIN — ACETAMINOPHEN 650 MG: 325 TABLET ORAL at 17:24

## 2023-10-12 RX ADMIN — OXYCODONE HYDROCHLORIDE 5 MG: 5 TABLET ORAL at 08:58

## 2023-10-12 RX ADMIN — ACETAMINOPHEN 650 MG: 325 TABLET ORAL at 11:30

## 2023-10-12 RX ADMIN — PRIMIDONE 150 MG: 50 TABLET ORAL at 11:30

## 2023-10-12 RX ADMIN — OXYCODONE HYDROCHLORIDE 10 MG: 5 TABLET ORAL at 15:39

## 2023-10-12 RX ADMIN — ACETAMINOPHEN 650 MG: 325 TABLET ORAL at 03:47

## 2023-10-12 RX ADMIN — PROPRANOLOL HYDROCHLORIDE 20 MG: 10 TABLET ORAL at 08:57

## 2023-10-12 ASSESSMENT — PAIN DESCRIPTION - PAIN TYPE
TYPE: SURGICAL PAIN
TYPE: SURGICAL PAIN

## 2023-10-12 ASSESSMENT — PAIN DESCRIPTION - DESCRIPTORS
DESCRIPTORS: SORE;DISCOMFORT
DESCRIPTORS: DISCOMFORT;SORE

## 2023-10-12 ASSESSMENT — PAIN SCALES - GENERAL
PAINLEVEL_OUTOF10: 9
PAINLEVEL_OUTOF10: 5
PAINLEVEL_OUTOF10: 5
PAINLEVEL_OUTOF10: 0
PAINLEVEL_OUTOF10: 9

## 2023-10-12 ASSESSMENT — PAIN DESCRIPTION - FREQUENCY
FREQUENCY: INTERMITTENT
FREQUENCY: INTERMITTENT

## 2023-10-12 ASSESSMENT — PAIN DESCRIPTION - ORIENTATION
ORIENTATION: RIGHT;LEFT;POSTERIOR;MID
ORIENTATION: LOWER;POSTERIOR

## 2023-10-12 ASSESSMENT — PAIN DESCRIPTION - ONSET
ONSET: GRADUAL
ONSET: GRADUAL

## 2023-10-12 ASSESSMENT — PAIN DESCRIPTION - LOCATION
LOCATION: BACK
LOCATION: BACK;HIP

## 2023-10-12 NOTE — PROGRESS NOTES
ACUTE PHYSICAL THERAPY GOALS:   (Developed with and agreed upon by patient and/or caregiver.)    (1.) Turner Carlton  will move from supine to sit and sit to supine , scoot up and down, and roll side to side with STAND BY ASSIST within 7 treatment day(s). (2.) Turner Carlton will transfer from bed to chair and chair to bed with STAND BY ASSIST using the least restrictive device within 7 treatment day(s). (3.) Turner Carlton will ambulate with STAND BY ASSIST for 150 feet with the least restrictive device within 7 treatment day(s). (4.) Turner Carlton will perform standing static and dynamic balance activities x 8 minutes with STAND BY ASSIST to improve safety within 7 treatment day(s). (5.) Turner Carlton will ascend and descend 2 stairs using 1 hand rail(s) with STAND BY ASSIST to improve functional mobility and safety within 7 treatment day(s). (6.) Turner Carlton will perform therapeutic exercises x 15 min for HEP with SUPERVISION to improve strength, endurance, and functional mobility within 7 treatment day(s). PHYSICAL THERAPY: Daily Note AM   (Link to Caseload Tracking: PT Visit Days : 2  Time In/Out PT Charge Capture  Rehab Caseload Tracker  Orders    Turner Carlton is a 79 y.o. 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  (N/A)  2 Days Post-Op  Inpatient: Payor: Manju Kaufman / Plan: MEDICARE PART A AND B / Product Type: *No Product type* /     ASSESSMENT:     REHAB RECOMMENDATIONS:   Recommendation to date pending progress:  Setting:  Short-term Rehab    Equipment:    To Be Determined     ASSESSMENT:  Ms. Linnea Blanco is making steady progress toward goals with increased gait distances. The patient performed bed mobility with cueing for log roll technique and sequencing. She was able  to scoot with less assistance today and demonstrated good sitting balance. The patient then stood and increased gait with the RW and CGA. She had no  loss of balance and reported decreased pain following gait.       SUBJECTIVE:   Ms. Linnea Blanco states, \"It feels so good to move\"     Social/Functional Lives With: Spouse  Type of Home: House  Home Layout: Two level, Able to Live on Main level with bedroom/bathroom  Home Access: Stairs to enter with rails  Entrance Stairs - Number of Steps: 2  Entrance Stairs - Rails: Left  Bathroom Shower/Tub: Walk-in shower, Shower chair with back  Home Equipment: Walker, rolling  OBJECTIVE:     PAIN: Skippy Pour / O2: Darryn Hussein / Ankush Platt / Clifton Schlatter:   Pre Treatment: Reports pain at surgical site, does not rate         Post Treatment: 7 Vitals        Oxygen    RADHA Drain    RESTRICTIONS/PRECAUTIONS:  Position Activity Restriction  Spinal Precautions: No Bending, No Lifting, No Twisting     MOBILITY: I Mod I S SBA CGA Min Mod Max Total  NT x2 Comments:   Bed Mobility    Rolling [] [] [] [] [] [] [] [] [] [x] []    Supine to Sit [] [] [] [] [] [x] [] [] [] [] []    Scooting [] [] [] [] [x] [] [] [] [] [] []    Sit to Supine [] [] [] [] [] [] [] [] [] [x] []    Transfers    Sit to Stand [] [] [] [] [] [x] [] [] [] [] []    Bed to Chair [] [] [] [] [x] [] [] [] [] [] []    Stand to Sit [] [] [] [] [] [x] [] [] [] [] []     [] [] [] [] [] [] [] [] [] [] []    I=Independent, Mod I=Modified Independent, S=Supervision, SBA=Standby Assistance, CGA=Contact Guard Assistance,   Min=Minimal Assistance, Mod=Moderate Assistance, Max=Maximal Assistance, Total=Total Assistance, NT=Not Tested    BALANCE: Good Fair+ Fair Fair- Poor NT Comments   Sitting Static [] [x] [] [] [] []    Sitting Dynamic [] [x] []

## 2023-10-12 NOTE — CARE COORDINATION
Chart reviewed by  for potential discharge needs or concerns. Therapy evals complete with the initial recommendation for STR at dc. CM met with pt/spouse to discuss this recommendation. Pt reported that she did much better with therapy today and feels she can dc home with HH. CM confirmed with pt/spouse that [address] [facility] PT/OT services have been arranged with [facility]. They are in agreement. Pt confirms she has a RW at home. No other dc needs or concerns identified or reported. Please notify/consult  if other discharge needs arise. 10/12/23 1316   Service Assessment   Patient Orientation Alert and Oriented   Cognition Alert   History Provided By Patient;Spouse;Medical Record   Primary Caregiver Self   Accompanied By/Relationship spouse   Support Systems Spouse/Significant Other   Patient's Healthcare Decision Maker is: Legal Next of Kin   PCP Verified by CM Yes   Last Visit to PCP Within last 6 months   Prior Functional Level Independent in ADLs/IADLs   Current Functional Level Assistance with the following:;Mobility   Can patient return to prior living arrangement Yes   Ability to make needs known: Good   Family able to assist with home care needs: Yes   Would you like for me to discuss the discharge plan with any other family members/significant others, and if so, who? No   Financial Resources Medicare   Social/Functional History   Lives With Spouse   Type of [address] [place] [address] Two level; Able to Live on Main level with bedroom/bathroom   Home Access Stairs to enter with rails   Entrance Stairs - Number of Steps 2   Entrance Stairs - Rails Left   Bathroom Shower/Tub Walk-in shower; Shower chair with back   [place], [address] [address] [facility]   Ambulation Assistance Independent   Transfer Assistance Independent   Occupation Retired   Discharge Planning   Type of [address] Spouse/Significant

## 2023-10-12 NOTE — PLAN OF CARE
Problem: Pain  Goal: Verbalizes/displays adequate comfort level or baseline comfort level  10/11/2023 2316 by Kim Leiva RN  Outcome: Progressing  10/11/2023 1038 by Mahnaz Ghosh RN  Outcome: Progressing     Problem: Safety - Adult  Goal: Free from fall injury  10/11/2023 2316 by Kim Leiva RN  Outcome: Progressing  10/11/2023 1038 by Mahnaz Ghosh RN  Outcome: Progressing     Problem: Discharge Planning  Goal: Discharge to home or other facility with appropriate resources  10/11/2023 2316 by Kim Leiva RN  Outcome: Progressing  10/11/2023 1038 by Mahnaz Ghosh RN  Outcome: Progressing     Problem: ABCDS Injury Assessment  Goal: Absence of physical injury  10/11/2023 2316 by Kim Leiva RN  Outcome: Progressing  10/11/2023 1038 by Mahnaz Ghosh RN  Outcome: Progressing

## 2023-10-12 NOTE — PROGRESS NOTES
Messaged attending provider, Imani Aldana, due to straight cathing pt twice during 24 hours. Orders to straight cath pt one more time.

## 2023-10-12 NOTE — PROGRESS NOTES
PT voided at 11:26 after walking the floor with PT/OT. Urine amount was unmeasurable due to going directly into the toilet. Pt bladder scanned and had post void residual of 73 mL.

## 2023-10-12 NOTE — PROGRESS NOTES
ACUTE PHYSICAL THERAPY GOALS:   (Developed with and agreed upon by patient and/or caregiver.)    (1.) Ivramona Starkirt  will move from supine to sit and sit to supine , scoot up and down, and roll side to side with STAND BY ASSIST within 7 treatment day(s). (2.) Ivramona Starkirt will transfer from bed to chair and chair to bed with STAND BY ASSIST using the least restrictive device within 7 treatment day(s). (3.) Ivramona Starkirt will ambulate with STAND BY ASSIST for 150 feet with the least restrictive device within 7 treatment day(s). (4.) Ivory Flirt will perform standing static and dynamic balance activities x 8 minutes with STAND BY ASSIST to improve safety within 7 treatment day(s). (5.) Ivramona Flirt will ascend and descend 2 stairs using 1 hand rail(s) with STAND BY ASSIST to improve functional mobility and safety within 7 treatment day(s). (6.) Ivramona Flirt will perform therapeutic exercises x 15 min for HEP with SUPERVISION to improve strength, endurance, and functional mobility within 7 treatment day(s). PHYSICAL THERAPY: Daily Note PM   (Link to Caseload Tracking: PT Visit Days : 2  Time In/Out PT Charge Capture  Rehab Caseload Tracker  Orders    Rossi Mcnamara is a 79 y.o. 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  (N/A)  2 Days Post-Op  Inpatient: Payor: Kennedi Moreno / Plan: MEDICARE PART A AND B / Product Type: *No Product type* /     ASSESSMENT:     REHAB RECOMMENDATIONS:   Recommendation to date pending progress:  Setting:  Short-term Rehab    Equipment:    To Be NT Comments   Sitting Static [x] [] [] [] [] []    Sitting Dynamic [x] [] [] [] [] []              Standing Static [] [x] [x] [] [] []    Standing Dynamic [] [] [x] [] [] []      GAIT: I Mod I S SBA CGA Min Mod Max Total  NT x2 Comments:   Level of Assistance [] [] [] [] [x] [] [] [] [] [] []    Distance   250 feet    DME Rolling Walker    Gait Quality Decreased step length and Decreased stance    Weightbearing Status      Stairs      I=Independent, Mod I=Modified Independent, S=Supervision, SBA=Standby Assistance, CGA=Contact Guard Assistance,   Min=Minimal Assistance, Mod=Moderate Assistance, Max=Maximal Assistance, Total=Total Assistance, NT=Not Tested    PLAN:   FREQUENCY AND DURATION: BID for duration of hospital stay or until stated goals are met, whichever comes first.    TREATMENT:   TREATMENT:   Co-Treatment PT/OT necessary due to patient's decreased overall endurance/tolerance levels, as well as need for high level skilled assistance to complete functional transfers/mobility and functional tasks  Therapeutic Activity (39 Minutes): Therapeutic activity included Rolling, Supine to Sit, Sit to Supine, Scooting, Transfer Training, Ambulation on level ground, Sitting balance , and Standing balance to improve functional Activity tolerance, Balance, Mobility, and Strength.     TREATMENT GRID:  N/A    AFTER TREATMENT PRECAUTIONS: Bed/Chair Locked, Call light within reach, Chair, Needs within reach, and RN notified    INTERDISCIPLINARY COLLABORATION:  RN/ PCT, PT/ PTA, and OT/ BANDA    EDUCATION:      TIME IN/OUT:  Time In: 1405  Time Out: [address]  Minutes: [facility] [place]

## 2023-10-12 NOTE — PROGRESS NOTES
ACUTE OCCUPATIONAL THERAPY GOALS:   (Developed with and agreed upon by patient and/or caregiver.)  1. Patient will verbalize and demonstrate understanding of spinal precautions with 100% accuracy during ADLs. 2. Patient will complete lower body bathing and dressing with INDEPENDENCE and adaptive equipment as needed. 3. Patient will complete functional transfers with INDEPENDENCE and adaptive equipment as needed. 4. Patient will complete toileting and toilet transfer with INDEPENDENCE.   5. Patient will complete functional mobility of household distances with INDEPENDENCE and adaptive equipment as needed. 6. Patient will demonstrate ability to log roll in bed with INDEPENDENCE and NO verbal cues from therapist.      Timeframe: 7 visits      OCCUPATIONAL THERAPY: Daily Note PM   OT Visit Days: 2   Time In/Out  OT Charge Capture  Rehab Caseload Tracker  OT Orders    SPINAL PRECAUTIONS (NO BENDING, NO LIFTING, NO TWISTING)    Rossi Mcnamara is a 79 y.o. 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  (N/A)  2 Days Post-Op  Inpatient: Payor: Kennedi Moreno / Plan: MEDICARE PART A AND B / Product Type: *No Product type* /     ASSESSMENT:     REHAB RECOMMENDATIONS: CURRENT LEVEL OF FUNCTION:  (Most Recently Demonstrated)   Recommendation to date pending progress:  Setting:  Short-term Rehab    Equipment:    Rolling Walker Bathing:  Minimal Assist UB  Dressing:  Minimal Assist UB  Feeding/Grooming:  Supervision/Setup seated EOS  Toileting:  Stand by Assist  Functional Mobility:  Contact Guard Assist w/ RW     ASSESSMENT:  Ms. Lauren Echevarria is progressing well Independent  Ambulation Assistance: Independent  Transfer Assistance: Independent  Occupation: Retired    OBJECTIVE:     LINES / Wolm Miyamoto / AIRWAY: None    RESTRICTIONS/PRECAUTIONS:  Position Activity Restriction  Spinal Precautions: No Bending, No Lifting, No Twisting        PAIN: VITALS / O2:   Pre Treatment:    Endorsing B hip pain during movement; does not provide objective rating        Post Treatment: resting comfortably up in chair Vitals          Oxygen   RA     MOBILITY: I Mod I S SBA CGA Min Mod Max Total  NT x2 Comments:   Bed Mobility    Rolling [] [] [] [] [x] [] [] [] [] [] []    Supine to Sit [] [] [] [] [x] [] [] [] [] [] [] Via log roll technique   Scooting [] [] [] [] [x] [] [] [] [] [] []    Sit to Supine [] [] [] [] [] [] [] [] [] [x] []    Transfers    Sit to Stand [] [] [] [] [] [x] [] [] [] [] [] RW   Bed to Chair [] [] [] [] [x] [] [] [] [] [] [] RW   Stand to Sit [] [] [] [] [] [x] [] [] [] [] [] RW   Tub/Shower [] [] [] [] [] [] [] [] [] [x] []     Toilet [] [] [] [] [] [x] [] [] [] [] []  RW    [] [] [] [] [] [] [] [] [] [] []    I=Independent, Mod I=Modified Independent, S=Supervision/Setup, SBA=Standby Assistance, CGA=Contact Guard Assistance, Min=Minimal Assistance, Mod=Moderate Assistance, Max=Maximal Assistance, Total=Total Assistance, NT=Not Tested    ACTIVITIES OF DAILY LIVING: I Mod I S SBA CGA Min Mod Max Total NT Comments   BASIC ADLs:              Upper Body   Bathing [] [] [] [] [] [x] [] [] [] []  Seated EOS   Lower Body Bathing [] [] [] [] [] [] [x] [] [] []  Standing/seated EOS   Toileting [] [] [] [x] [] [] [] [] [] []    Upper Body Dressing [] [] [x] [] [] [] [] [] [] [] Doff/don gown seated EOS   Lower Body Dressing [] [] [] [] [] [] [] [] [] [x]    Feeding [] [] [] [] [] [] [] [] [] [x]    Grooming [] [] [x] [] [] [] [] [] [] [] Completed face washing, shampooing hair via shower cap, and hair combing seated EOS   Personal Device Care [] [] [] [] [] [] [] [] [] [x]

## 2023-10-13 LAB
MM INDURATION, POC: 0 MM (ref 0–5)
PPD, POC: NEGATIVE

## 2023-10-13 PROCEDURE — 6370000000 HC RX 637 (ALT 250 FOR IP): Performed by: ORTHOPAEDIC SURGERY

## 2023-10-13 PROCEDURE — 1100000000 HC RM PRIVATE

## 2023-10-13 PROCEDURE — 6360000002 HC RX W HCPCS: Performed by: ORTHOPAEDIC SURGERY

## 2023-10-13 PROCEDURE — 97530 THERAPEUTIC ACTIVITIES: CPT

## 2023-10-13 PROCEDURE — 97535 SELF CARE MNGMENT TRAINING: CPT

## 2023-10-13 PROCEDURE — 6370000000 HC RX 637 (ALT 250 FOR IP): Performed by: NURSE PRACTITIONER

## 2023-10-13 PROCEDURE — 94640 AIRWAY INHALATION TREATMENT: CPT

## 2023-10-13 PROCEDURE — 2580000003 HC RX 258: Performed by: ORTHOPAEDIC SURGERY

## 2023-10-13 PROCEDURE — 94760 N-INVAS EAR/PLS OXIMETRY 1: CPT

## 2023-10-13 RX ORDER — BISACODYL 10 MG
10 SUPPOSITORY, RECTAL RECTAL DAILY PRN
Status: DISCONTINUED | OUTPATIENT
Start: 2023-10-13 | End: 2023-10-14 | Stop reason: HOSPADM

## 2023-10-13 RX ORDER — GABAPENTIN 300 MG/1
300 CAPSULE ORAL EVERY EVENING
Status: DISCONTINUED | OUTPATIENT
Start: 2023-10-13 | End: 2023-10-14 | Stop reason: HOSPADM

## 2023-10-13 RX ADMIN — ONDANSETRON 4 MG: 2 INJECTION INTRAMUSCULAR; INTRAVENOUS at 05:20

## 2023-10-13 RX ADMIN — SODIUM CHLORIDE, PRESERVATIVE FREE 10 ML: 5 INJECTION INTRAVENOUS at 22:17

## 2023-10-13 RX ADMIN — FLUTICASONE PROPIONATE 1 SPRAY: 50 SPRAY, METERED NASAL at 22:16

## 2023-10-13 RX ADMIN — BISACODYL 5 MG: 5 TABLET, COATED ORAL at 09:13

## 2023-10-13 RX ADMIN — ACETAMINOPHEN 650 MG: 325 TABLET ORAL at 04:46

## 2023-10-13 RX ADMIN — ESCITALOPRAM OXALATE 20 MG: 10 TABLET ORAL at 22:16

## 2023-10-13 RX ADMIN — GABAPENTIN 300 MG: 300 CAPSULE ORAL at 22:14

## 2023-10-13 RX ADMIN — PRIMIDONE 150 MG: 50 TABLET ORAL at 09:13

## 2023-10-13 RX ADMIN — LORAZEPAM 1 MG: 0.5 TABLET ORAL at 22:15

## 2023-10-13 RX ADMIN — POLYETHYLENE GLYCOL 3350 17 G: 17 POWDER, FOR SOLUTION ORAL at 09:13

## 2023-10-13 RX ADMIN — SODIUM CHLORIDE, PRESERVATIVE FREE 10 ML: 5 INJECTION INTRAVENOUS at 09:15

## 2023-10-13 RX ADMIN — ACETAMINOPHEN 650 MG: 325 TABLET ORAL at 22:24

## 2023-10-13 RX ADMIN — PROPRANOLOL HYDROCHLORIDE 20 MG: 10 TABLET ORAL at 13:45

## 2023-10-13 RX ADMIN — LAMOTRIGINE 150 MG: 100 TABLET ORAL at 22:15

## 2023-10-13 RX ADMIN — PRIMIDONE 150 MG: 50 TABLET ORAL at 13:45

## 2023-10-13 RX ADMIN — PRIMIDONE 150 MG: 50 TABLET ORAL at 17:31

## 2023-10-13 RX ADMIN — PROPRANOLOL HYDROCHLORIDE 20 MG: 10 TABLET ORAL at 09:13

## 2023-10-13 RX ADMIN — MOMETASONE FUROATE AND FORMOTEROL FUMARATE DIHYDRATE 2 PUFF: 200; 5 AEROSOL RESPIRATORY (INHALATION) at 07:03

## 2023-10-13 RX ADMIN — ACETAMINOPHEN 650 MG: 325 TABLET ORAL at 11:52

## 2023-10-13 RX ADMIN — ACETAMINOPHEN 650 MG: 325 TABLET ORAL at 17:31

## 2023-10-13 RX ADMIN — OXYCODONE HYDROCHLORIDE 5 MG: 5 TABLET ORAL at 09:12

## 2023-10-13 RX ADMIN — VENLAFAXINE HYDROCHLORIDE 225 MG: 75 CAPSULE, EXTENDED RELEASE ORAL at 09:13

## 2023-10-13 RX ADMIN — BISACODYL 10 MG: 10 SUPPOSITORY RECTAL at 14:27

## 2023-10-13 RX ADMIN — PROMETHAZINE HYDROCHLORIDE 12.5 MG: 25 TABLET ORAL at 22:24

## 2023-10-13 RX ADMIN — PRIMIDONE 150 MG: 50 TABLET ORAL at 22:15

## 2023-10-13 RX ADMIN — PROPRANOLOL HYDROCHLORIDE 20 MG: 10 TABLET ORAL at 17:31

## 2023-10-13 ASSESSMENT — PAIN DESCRIPTION - ORIENTATION: ORIENTATION: RIGHT;LEFT

## 2023-10-13 ASSESSMENT — PAIN DESCRIPTION - DESCRIPTORS: DESCRIPTORS: ACHING;SORE

## 2023-10-13 ASSESSMENT — PAIN DESCRIPTION - LOCATION: LOCATION: HIP

## 2023-10-13 ASSESSMENT — PAIN SCALES - GENERAL
PAINLEVEL_OUTOF10: 2
PAINLEVEL_OUTOF10: 6

## 2023-10-13 ASSESSMENT — PAIN DESCRIPTION - FREQUENCY: FREQUENCY: INTERMITTENT

## 2023-10-13 ASSESSMENT — PAIN DESCRIPTION - ONSET: ONSET: ON-GOING

## 2023-10-13 ASSESSMENT — PAIN DESCRIPTION - PAIN TYPE: TYPE: ACUTE PAIN

## 2023-10-13 NOTE — PROGRESS NOTES
ACUTE PHYSICAL THERAPY GOALS:   (Developed with and agreed upon by patient and/or caregiver.)    (1.) Brandon Herndon  will move from supine to sit and sit to supine , scoot up and down, and roll side to side with STAND BY ASSIST within 7 treatment day(s). (2.) Brandon Herndon will transfer from bed to chair and chair to bed with STAND BY ASSIST using the least restrictive device within 7 treatment day(s). (3.) Brandon Herndon will ambulate with STAND BY ASSIST for 150 feet with the least restrictive device within 7 treatment day(s). (4.) Brandon Herndon will perform standing static and dynamic balance activities x 8 minutes with STAND BY ASSIST to improve safety within 7 treatment day(s). (5.) Brandon Herndon will ascend and descend 2 stairs using 1 hand rail(s) with STAND BY ASSIST to improve functional mobility and safety within 7 treatment day(s). (6.) Brandon Herndon will perform therapeutic exercises x 15 min for HEP with SUPERVISION to improve strength, endurance, and functional mobility within 7 treatment day(s). PHYSICAL THERAPY: Daily Note PM   (Link to Caseload Tracking: PT Visit Days : 3  Time In/Out PT Charge Capture  Rehab Caseload Tracker  Orders    Brandon Herndon is a 79 y.o. 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  (N/A)  3 Days Post-Op  Inpatient: Payor: MEDICARE / Plan: MEDICARE PART A AND B / Product Type: *No Product type* /     ASSESSMENT:     REHAB RECOMMENDATIONS:   Recommendation to date pending progress:  Setting:  Home Health Therapy    Equipment:    To Be

## 2023-10-13 NOTE — PLAN OF CARE
Problem: Pain  Goal: Verbalizes/displays adequate comfort level or baseline comfort level  10/12/2023 1603 by Jordan Doan RN  Outcome: Progressing     Problem: Safety - Adult  Goal: Free from fall injury  10/12/2023 1603 by Jordan Doan RN  Outcome: Progressing     Problem: Discharge Planning  Goal: Discharge to home or other facility with appropriate resources  Recent Flowsheet Documentation  Taken 10/12/2023 1946 by Xiang Grove RN  Discharge to home or other facility with appropriate resources: Identify barriers to discharge with patient and caregiver  10/12/2023 1603 by Jordan Doan RN  Outcome: Progressing     Problem: ABCDS Injury Assessment  Goal: Absence of physical injury  10/12/2023 1603 by Jordan Doan RN  Outcome: Progressing

## 2023-10-13 NOTE — PROGRESS NOTES
ACUTE OCCUPATIONAL THERAPY GOALS:   (Developed with and agreed upon by patient and/or caregiver.)  1. Patient will verbalize and demonstrate understanding of spinal precautions with 100% accuracy during ADLs. 2. Patient will complete lower body bathing and dressing with INDEPENDENCE and adaptive equipment as needed. 3. Patient will complete functional transfers with INDEPENDENCE and adaptive equipment as needed. 4. Patient will complete toileting and toilet transfer with INDEPENDENCE.   5. Patient will complete functional mobility of household distances with INDEPENDENCE and adaptive equipment as needed. 6. Patient will demonstrate ability to log roll in bed with INDEPENDENCE and NO verbal cues from therapist.      Timeframe: 7 visits      OCCUPATIONAL THERAPY: Daily Note PM   OT Visit Days: 3   Time In/Out  OT Charge Capture  Rehab Caseload Tracker  OT Orders    SPINAL PRECAUTIONS (NO BENDING, NO LIFTING, NO TWISTING)    Tamica Gan is a 79 y.o. 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  (N/A)  3 Days Post-Op  Inpatient: Payor: Megan Postal / Plan: MEDICARE PART A AND B / Product Type: *No Product type* /     ASSESSMENT:     REHAB RECOMMENDATIONS: CURRENT LEVEL OF FUNCTION:  (Most Recently Demonstrated)   Recommendation to date pending progress:  Setting:  Home Health Therapy    Equipment:    Rolling Walker Bathing:  Not Tested  Dressing:  Not Tested   Feeding/Grooming:  Contact Guard Assist standing EOS  Toileting:  Not Tested  Functional Mobility:  Contact Guard Assist w/ RW     ASSESSMENT:  Ms. Sands Grandnitin is received supine in bed, agreeable to participate in the session. Pt performed bed mobility transfers via log roll technique with CGA-Min A and additional time/cues for technique. Pt presented with good seated balance EOB requiring supervision. Pt performed sit<>stand transfers, bed to chair transfer, toilet transfers, and functional mobility for ADLs using RW with CGA and additional time/cues for technique. Pt completed standing grooming tasks at sink with SBA-CGA for balance control. Pt demonstrated good precaution adherence during ADL routine, indicating good follow through and functional carryover from previous session. Pt positioned comfortably up in chair at end of session with all needs within reach. Overall good progress towards OT goals with noted improvements in functional transfers and activity tolerance. Pt continues to demonstrate overall deficits in ADL performance, functional transfers, household mobility for ADLs, strength, balance, and activity tolerance. Continue OT efforts to address functional deficits and POC. Will continue to monitor and advance as indicated.         SUBJECTIVE:     Ms. Tahir Moss states, \"It's the transitions that hurt\"     Social/Functional Lives With: Spouse  Type of Home: House  Home Layout: Two level, Able to Live on Main level with bedroom/bathroom  Home Access: Stairs to enter with rails  Entrance Stairs - Number of Steps: 2  Entrance Stairs - Rails: Left  Bathroom Shower/Tub: Walk-in shower, Shower chair with back  Home Equipment: Walker, rolling  ADL Assistance: Independent  Homemaking Assistance: Independent  Ambulation Assistance: Independent  Transfer Assistance: Independent  Occupation: Retired    OBJECTIVE:     Uriel Long / Sierra Lmaas / Ayan Goins: None    RESTRICTIONS/PRECAUTIONS:  Position Activity Restriction  Spinal Precautions: No Bending, No Lifting, No Twisting        PAIN: VITALS / O2:   Pre Treatment:    Endorsing B hip pain during movement; does not provide objective rating        Post Treatment: Standing Static [] [x] [] [] [] []    Standing Dynamic [] [x] [] [] [] []        PLAN:     FREQUENCY/DURATION   OT Plan of Care: 3 times/week for duration of hospital stay or until stated goals are met, whichever comes first.    TREATMENT:     TREATMENT:   Therapeutic Activity (13 Minutes): Patient participated in therapeutic activities including bed mobility training, functional transfer training, functional mobility of community distances, sitting tolerance activity, and standing tolerance activity with minimal assistance, verbal cues, tactile cues, education, and adaptive equipment in order to increase independence, increase safety awareness, increase activity tolerance, decrease assistance required, prepare for functional activity, and prepare for functional transfers. Self Care (10 minutes): Patient participated in grooming ADLs in standing with minimal visual, verbal, and manual cueing to increase independence, decrease assistance required, increase activity tolerance, increase safety awareness, and maintain precautions. Patient also participated in functional mobility and functional transfer training to increase independence, decrease assistance required, increase activity tolerance, increase safety awareness, and maintain precautions.      TREATMENT GRID:  N/A    AFTER TREATMENT PRECAUTIONS: Alarm Activated, Bed/Chair Locked, Call light within reach, Chair, Needs within reach, and RN notified    INTERDISCIPLINARY COLLABORATION:  RN/ PCT and OT/ BANDA    EDUCATION:       TOTAL TREATMENT DURATION AND TIME:  Time In: 7896  Time Out: 1106  Minutes: [address] [place] [address]

## 2023-10-13 NOTE — PROGRESS NOTES
ACUTE PHYSICAL THERAPY GOALS:   (Developed with and agreed upon by patient and/or caregiver.)    (1.) Wing Esposito  will move from supine to sit and sit to supine , scoot up and down, and roll side to side with STAND BY ASSIST within 7 treatment day(s). (2.) Wing Esposito will transfer from bed to chair and chair to bed with STAND BY ASSIST using the least restrictive device within 7 treatment day(s). (3.) Wing Esposito will ambulate with STAND BY ASSIST for 150 feet with the least restrictive device within 7 treatment day(s). (4.) Wing Esposito will perform standing static and dynamic balance activities x 8 minutes with STAND BY ASSIST to improve safety within 7 treatment day(s). (5.) Wing Esposito will ascend and descend 2 stairs using 1 hand rail(s) with STAND BY ASSIST to improve functional mobility and safety within 7 treatment day(s). (6.) Wing Esposito will perform therapeutic exercises x 15 min for HEP with SUPERVISION to improve strength, endurance, and functional mobility within 7 treatment day(s). PHYSICAL THERAPY: Daily Note AM   (Link to Caseload Tracking: PT Visit Days : 3  Time In/Out PT Charge Capture  Rehab Caseload Tracker  Orders    Wing Esposito is a 79 y.o. 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  (N/A)  3 Days Post-Op  Inpatient: Payor: MEDICARE / Plan: MEDICARE PART A AND B / Product Type: *No Product type* /     ASSESSMENT:     REHAB RECOMMENDATIONS:   Recommendation to date pending progress:  Setting:  Home Health Therapy    Equipment:    To Be Determined     ASSESSMENT:  Ms. Maribel Kemp is making good progress toward goals with increased mobility. The patient performed STS and ambulation with the RW and CGA. She demonstrated good balance throughout. Reviewed log rolling technique and sequencing for getting in and out of the bed.       SUBJECTIVE:   Ms. Maribel Kemp states, \"Its not as good as yesterday\"     Social/Functional Lives With: Spouse  Type of Home: House  Home Layout: Two level, Able to Live on Main level with bedroom/bathroom  Home Access: Stairs to enter with rails  Entrance Stairs - Number of Steps: 2  Entrance Stairs - Rails: Left  Bathroom Shower/Tub: Walk-in shower, Shower chair with back  Home Equipment: Walker, rolling  ADL Assistance: Independent  Homemaking Assistance: Independent  Ambulation Assistance: Independent  Transfer Assistance: Independent  Occupation: Retired  OBJECTIVE:     PAIN: VITALS / O2: Octaviano Garcia / Rachel Durant / Jenn Mean:   Pre Treatment: Reports pain at surgical site, does not rate         Post Treatment: same Vitals        Oxygen    None    RESTRICTIONS/PRECAUTIONS:  Position Activity Restriction  Spinal Precautions: No Bending, No Lifting, No Twisting     MOBILITY: I Mod I S SBA CGA Min Mod Max Total  NT x2 Comments:   Bed Mobility    Rolling [] [] [] [] [x] [] [] [] [] [] []    Supine to Sit [] [] [] [] [x] [] [] [] [] [] []    Scooting [] [] [] [] [x] [] [] [] [] [] []    Sit to Supine [] [] [] [] [] [x] [] [] [] [] []    Transfers    Sit to Stand [] [] [] [] [x] [] [] [] [] [] []    Bed to Chair [] [] [] [] [x] [] [] [] [] [] []    Stand to Sit [] [] [] [] [x] [x] [] [] [] [] []     [] [] [] [] [] [] [] [] [] [] []    I=Independent, Mod I=Modified Independent, S=Supervision, SBA=Standby Assistance, CGA=Contact Guard Assistance,   Min=Minimal Assistance, Mod=Moderate Assistance, Max=Maximal Assistance, Total=Total Assistance, NT=Not Tested    BALANCE: Good Fair+ Fair Fair- Poor NT Comments   Sitting Static [x] [] [] [] [] [] Sitting Dynamic [x] [] [] [] [] []              Standing Static [] [x] [x] [] [] []    Standing Dynamic [] [] [x] [] [] []      GAIT: I Mod I S SBA CGA Min Mod Max Total  NT x2 Comments:   Level of Assistance [] [] [] [] [x] [] [] [] [] [] []    Distance   250 feet    DME Rolling Walker    Gait Quality Decreased step length and Decreased stance    Weightbearing Status      Stairs      I=Independent, Mod I=Modified Independent, S=Supervision, SBA=Standby Assistance, CGA=Contact Guard Assistance,   Min=Minimal Assistance, Mod=Moderate Assistance, Max=Maximal Assistance, Total=Total Assistance, NT=Not Tested    PLAN:   FREQUENCY AND DURATION: BID for duration of hospital stay or until stated goals are met, whichever comes first.    TREATMENT:   TREATMENT:   Therapeutic Activity (26 Minutes): Therapeutic activity included Rolling, Sit to Supine, Scooting, Transfer Training, Ambulation on level ground, Sitting balance , and Standing balance to improve functional Activity tolerance, Balance, Mobility, and Strength.     TREATMENT GRID:  N/A    AFTER TREATMENT PRECAUTIONS: Bed, Bed/Chair Locked, Call light within reach, Needs within reach, and RN notified    INTERDISCIPLINARY COLLABORATION:  RN/ PCT and PT/ PTA    EDUCATION:      TIME IN/OUT:  Time In: 1410  Time Out: 1000  Minutes: SUNDAY Wilhelm

## 2023-10-14 VITALS
WEIGHT: 231.4 LBS | BODY MASS INDEX: 37.19 KG/M2 | DIASTOLIC BLOOD PRESSURE: 70 MMHG | TEMPERATURE: 98.2 F | HEART RATE: 70 BPM | RESPIRATION RATE: 16 BRPM | HEIGHT: 66 IN | SYSTOLIC BLOOD PRESSURE: 153 MMHG | OXYGEN SATURATION: 93 %

## 2023-10-14 PROCEDURE — 94640 AIRWAY INHALATION TREATMENT: CPT

## 2023-10-14 PROCEDURE — 6370000000 HC RX 637 (ALT 250 FOR IP): Performed by: ORTHOPAEDIC SURGERY

## 2023-10-14 PROCEDURE — 94760 N-INVAS EAR/PLS OXIMETRY 1: CPT

## 2023-10-14 PROCEDURE — 2580000003 HC RX 258: Performed by: ORTHOPAEDIC SURGERY

## 2023-10-14 PROCEDURE — 97530 THERAPEUTIC ACTIVITIES: CPT

## 2023-10-14 RX ADMIN — VENLAFAXINE HYDROCHLORIDE 225 MG: 75 CAPSULE, EXTENDED RELEASE ORAL at 08:37

## 2023-10-14 RX ADMIN — OXYCODONE HYDROCHLORIDE 10 MG: 5 TABLET ORAL at 06:19

## 2023-10-14 RX ADMIN — BISACODYL 5 MG: 5 TABLET, COATED ORAL at 08:37

## 2023-10-14 RX ADMIN — PRIMIDONE 150 MG: 50 TABLET ORAL at 08:37

## 2023-10-14 RX ADMIN — MOMETASONE FUROATE AND FORMOTEROL FUMARATE DIHYDRATE 2 PUFF: 200; 5 AEROSOL RESPIRATORY (INHALATION) at 08:12

## 2023-10-14 RX ADMIN — ACETAMINOPHEN 650 MG: 325 TABLET ORAL at 08:36

## 2023-10-14 RX ADMIN — PROPRANOLOL HYDROCHLORIDE 20 MG: 10 TABLET ORAL at 08:37

## 2023-10-14 RX ADMIN — POLYETHYLENE GLYCOL 3350 17 G: 17 POWDER, FOR SOLUTION ORAL at 08:37

## 2023-10-14 RX ADMIN — SODIUM CHLORIDE, PRESERVATIVE FREE 10 ML: 5 INJECTION INTRAVENOUS at 08:37

## 2023-10-14 ASSESSMENT — PAIN SCALES - GENERAL
PAINLEVEL_OUTOF10: 8
PAINLEVEL_OUTOF10: 0
PAINLEVEL_OUTOF10: 0

## 2023-10-14 ASSESSMENT — PAIN - FUNCTIONAL ASSESSMENT: PAIN_FUNCTIONAL_ASSESSMENT: PREVENTS OR INTERFERES SOME ACTIVE ACTIVITIES AND ADLS

## 2023-10-14 ASSESSMENT — PAIN DESCRIPTION - ORIENTATION: ORIENTATION: RIGHT;LEFT

## 2023-10-14 ASSESSMENT — PAIN DESCRIPTION - FREQUENCY: FREQUENCY: INTERMITTENT

## 2023-10-14 ASSESSMENT — PAIN DESCRIPTION - DESCRIPTORS: DESCRIPTORS: ACHING;SORE

## 2023-10-14 ASSESSMENT — PAIN DESCRIPTION - ONSET: ONSET: GRADUAL

## 2023-10-14 ASSESSMENT — PAIN DESCRIPTION - PAIN TYPE: TYPE: ACUTE PAIN

## 2023-10-14 ASSESSMENT — PAIN DESCRIPTION - LOCATION: LOCATION: HIP

## 2023-10-14 NOTE — DISCHARGE INSTRUCTIONS
MAY shower NO tub bathing    LEAVE dressing on incision for 3 DAYS-->then may remove   (If dressing starts to fall off may re-secure with tape)    NO lifting anything heavier than 5LBS     NO Bending, Lifting or Twisting    NO driving until directed by your doctor    Avoid sitting more than 20 - 30 minutes at a time    DO NOT take any NSAIDS (either prescribed or over the counter until directed    (Aleve, Ibuprofen, Mobic, etc) as this will interfere with bone healing    CALL the doctor if:  Fever >100.5  ([phone])                 Incision becomes red, swollen or  opens up               Incision has yellow, thick drainage or an odor               Pain is not managed with prescribed medications               Excessive nausea and/or vomiting    Avoid having pets sleep in bed with you until incision is completely healed

## 2023-10-14 NOTE — PROGRESS NOTES
ACUTE PHYSICAL THERAPY GOALS:   (Developed with and agreed upon by patient and/or caregiver.)  DISCHARGED 10/14 at 11:30 am   (1.) Praveen Rodrigues  will move from supine to sit and sit to supine , scoot up and down, and roll side to side with STAND BY ASSIST within 7 treatment day(s). (2.) Praveen Rodrigues will transfer from bed to chair and chair to bed with STAND BY ASSIST using the least restrictive device within 7 treatment day(s). (3.) Praveen Rodrigues will ambulate with STAND BY ASSIST for 150 feet with the least restrictive device within 7 treatment day(s). (4.) Praveen Rodrigues will perform standing static and dynamic balance activities x 8 minutes with STAND BY ASSIST to improve safety within 7 treatment day(s). (5.) Praveen Rodrigues will ascend and descend 2 stairs using 1 hand rail(s) with STAND BY ASSIST to improve functional mobility and safety within 7 treatment day(s). (6.) Praveen Rodrigues will perform therapeutic exercises x 15 min for HEP with SUPERVISION to improve strength, endurance, and functional mobility within 7 treatment day(s). PHYSICAL THERAPY: Daily Note AM   (Link to Caseload Tracking: PT Visit Days : 3  Time In/Out PT Charge Capture  Rehab Caseload Tracker  Orders    Praveen Rodrigues is a 79 y.o. 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  (N/A)  4 Days Post-Op  Inpatient: Payor: Norm Ku / Plan: MEDICARE PART A AND B / Product Type: *No Product type* /     ASSESSMENT:     REHAB RECOMMENDATIONS:   Recommendation to date pending progress:  Setting:  [facility]    Equipment:    To Be Determined     ASSESSMENT:  Ms. Chandra Perdomo is making good progress toward goals with increased mobility. The patient performed STS and ambulation with the RW and SBA x 1. She demonstrated good balance throughout. Reviewed log rolling technique and sequencing for getting in and out of the bed 250 feet in room and forbes with 2 wheel rolling walker. DC home. SUBJECTIVE:   Ms. Chandra Perdomo states, \"ready to go home.  \"     Social/Functional Lives With: Spouse  Type of Home: House  Home Layout: Two level, Able to Live on Main level with bedroom/bathroom  Home Access: Stairs to enter with rails  Entrance Stairs - Number of Steps: 2  Entrance Stairs - Rails: Left  Bathroom Shower/Tub: Walk-in shower, Shower chair with back  Home Equipment: Walker, rolling  ADL Assistance: Independent  Homemaking Assistance: Independent  Ambulation Assistance: Independent  Transfer Assistance: Independent  Occupation: Retired  OBJECTIVE:     PAIN: VITALS / O2: PRECAUTION / Denece Liming / Cherelle Lipps:   Pre Treatment: Reports pain at surgical site, does not rate         Post Treatment: same Vitals        Oxygen    None    RESTRICTIONS/PRECAUTIONS:  Position Activity Restriction  Spinal Precautions: No Bending, No Lifting, No Twisting     MOBILITY: I Mod I S SBA CGA Min Mod Max Total  NT x2 Comments:   Bed Mobility    Rolling [] [] [] [x] [] [] [] [] [] [] []    Supine to Sit [] [] [] [x] [] [] [] [] [] [] []    Scooting [] [] [] [x] [] [] [] [] [] [] []    Sit to Supine [] [] [] [x] [] [] [] [] [] [] []    Transfers    Sit to Stand [] [] [] [x] [] [] [] [] [] [] []    Bed to Chair [] [] [] [x] [] [] [] [] [] [] []    Stand to Sit [] [] [] [x] [] [] [] [] [] [] []     [] [] [] [] [] [] [] [] [] [] []    I=Independent, Mod I=Modified Independent, S=Supervision, SBA=Standby Assistance, CGA=Contact Guard Assistance,   Min=Minimal Assistance, Mod=Moderate Assistance, Max=Maximal Assistance, Total=Total Assistance, NT=Not

## 2023-10-14 NOTE — DISCHARGE SUMMARY
Discharge Summary    Juno Mckeon  [id number]  1955  79 y.o. 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 Procedure:   1. Posterolateral and transforaminal lumbar interbody fusion L4-L5 and L5-S1 including laminectomy at L4, L5, and S1 for stenosis (CPT 55755, 55116, 43990, 01082 X 2)  2. Insertion biomechanical device L4-L5 and L5-S1 (CPT 22853 X 2)  3. Instrumentation  L2 through S1 . (CPT U2210251)  4. Allograft (CPT 28336)  5. Lumbar laminectomy L2 and L3 with foraminotomies. (CPT [id number], [id number])  6. Posterolateral fusion without interbody fusion L2-3 and L3-4 (CPT 22614 X 2)          HBG at Discharge: No results for input(s): \"HGB\" in the last 72 hours. Hospital Course: Patient admitted to ortho floor. Antibiotics were given postop. SCD and john hose were in place for DVT prophylaxis. Haywood catheter was in place until POD#1 then discontinued. Patient did not receive blood transfusion. The patient tolerated pain medications and po diet. The drain output gradually diminished and was then removed. Dressing remained clean, dry, and intact. Physical Therapy started on the day following surgery and progressed to ambulation without assistance. At the time of discharge, the patient had understanding of precautions needed following surgery.       Postoperative complications requiring extended length of stay: None    Discharged to: Home    Discharge Medications:      Medication List        START taking these medications      oxyCODONE 5 MG immediate release tablet  Commonly known as: Roxicodone  Take 1 tablet by mouth every

## 2023-10-14 NOTE — CARE COORDINATION
Pt is for discharge home today with family. Referral called/faxed to [facility] for follow up home care as   10/14/23 6221   Service Assessment   Patient's Healthcare Decision Maker is: Legal Next of Kin   Social/Functional History   Lives With Spouse   Type of [facility]  Two level; Able to Live on Main level with bedroom/bathroom   Home Access Stairs to enter with rails   Entrance Stairs - Number of Steps 2   Entrance Stairs - Rails Left   Bathroom Shower/Tub Walk-in shower; Shower chair with back   [place], rolling   ADL Assistance Independent   Homemaking Assistance Independent   Ambulation Assistance Independent   Transfer Assistance Independent   Occupation Retired   Services At/After Discharge   Transition of [address] [place] [address]  [facility] No   Reason Outside [address] Physician referred to specific agency  ([facility])   [address] Discharge Home Health;PT;OT   [address] Provided? No   Mode of Transport at Discharge Other (see comment)  (family)   Confirm Follow Up Transport Family   Condition of Participation: Discharge Planning   The Plan for Transition of Care is related to the following treatment goals: Home health therapy services to support pt's care and recovery in the home s/p spine surgery   The Patient and/or Patient Representative was provided with a Choice of Provider? Patient   The Patient and/Or Patient Representative agree with the Discharge Plan? Yes   Freedom of Choice list was provided with basic dialogue that supports the patient's individualized plan of care/goals, treatment preferences, and shares the quality data associated with the providers?   No  ([facility] services arranged by surgeon's office prior to admission)

## 2023-10-14 NOTE — PLAN OF CARE
Problem: Pain  Goal: Verbalizes/displays adequate comfort level or baseline comfort level  10/14/2023 0044 by Jeremy Shirley RN  Outcome: Progressing  10/13/2023 1112 by Joe Waters RN  Outcome: Progressing     Problem: Safety - Adult  Goal: Free from fall injury  10/14/2023 0044 by Jeremy Shirley RN  Outcome: Progressing  10/13/2023 1112 by Joe Waters RN  Outcome: Progressing     Problem: Discharge Planning  Goal: Discharge to home or other facility with appropriate resources  10/14/2023 0044 by Jeremy Shirley RN  Outcome: Progressing  10/13/2023 1112 by Joe Waters RN  Outcome: Progressing  Flowsheets (Taken 10/13/2023 0845)  Discharge to home or other facility with appropriate resources: Identify barriers to discharge with patient and caregiver     Problem: ABCDS Injury Assessment  Goal: Absence of physical injury  10/14/2023 0044 by Jeremy Shirley RN  Outcome: Progressing  10/13/2023 1112 by Joe Waters RN  Outcome: Progressing  Flowsheets (Taken 10/13/2023 0845)  Absence of Physical Injury: Implement safety measures based on patient assessment     Problem: Skin/Tissue Integrity - Adult  Goal: Skin integrity remains intact  10/14/2023 0044 by Jeremy Shirley RN  Outcome: Progressing  10/13/2023 1112 by Joe Waters RN  Outcome: Progressing  Flowsheets (Taken 10/13/2023 0845)  Skin Integrity Remains Intact: Monitor for areas of redness and/or skin breakdown     Problem: Musculoskeletal - Adult  Goal: Return mobility to safest level of function  10/14/2023 0044 by Jeremy Shirley RN  Outcome: Progressing  10/13/2023 1112 by Joe Waters RN  Outcome: Progressing  Goal: Maintain proper alignment of affected body part  10/14/2023 0044 by Jeremy Shirley RN  Outcome: Progressing  10/13/2023 1112 by Joe Waters RN  Outcome: Progressing  Goal: Return ADL status to a safe level of function  10/14/2023 0044 by Jeremy Shirley RN  Outcome: Progressing  10/13/2023 1112 by Joe Waters

## 2023-10-16 ENCOUNTER — TELEPHONE (OUTPATIENT)
Dept: FAMILY MEDICINE CLINIC | Facility: CLINIC | Age: 68
End: 2023-10-16

## 2023-10-16 ENCOUNTER — TELEPHONE (OUTPATIENT)
Dept: ORTHOPEDIC SURGERY | Age: 68
End: 2023-10-16

## 2023-10-16 RX ORDER — BACLOFEN 10 MG/1
5 TABLET ORAL NIGHTLY PRN
Qty: 15 TABLET | Refills: 0 | Status: SHIPPED | OUTPATIENT
Start: 2023-10-16 | End: 2023-10-18

## 2023-10-16 NOTE — TELEPHONE ENCOUNTER
Care Transitions Initial Follow Up Call    Outreach made within 2 business days of discharge: Yes    Patient: Jose Guadalupe Long Patient : 1955   MRN: [id number]  Reason for Admission: There are no discharge diagnoses documented for the most recent discharge. Discharge Date: 10/14/23       Spoke with: patient    Discharge department/facility: home    TCM Interactive Patient Contact:  Was patient able to fill all prescriptions: Yes  Was patient instructed to bring all medications to the follow-up visit: Yes  Is patient taking all medications as directed in the discharge summary? Yes  Does patient understand their discharge instructions: Yes  Does patient have questions or concerns that need addressed prior to 7-14 day follow up office visit: no  Pt has follow up with Ortho for post op. Per Dr. Erlin Peacock pt does not need a hospital follow up with her.      Scheduled appointment with PCP within 7-14 days    Follow Up  Future Appointments   Date Time Provider [address] [place] [address]   10/27/2023 12:15 PM Alyssa Zendejas MD [facility]   2023  2:15 PM MD LETHA Ohara [facility]   1/3/2024  9:00 AM [facility]   2024 10:20 AM Brenda Gan DO [facility]       Yasmeen Edmond MA

## 2023-10-16 NOTE — TELEPHONE ENCOUNTER
Per    Doesn't need appt.   Pt went in for scheduled surgery         Previous Messages       ----- Message -----   From: Rolly Reyes MA   Sent: 10/16/2023   9:26 AM EDT   To: Dina Henderson DO     TCM Call   D/C 10/14

## 2023-10-16 NOTE — TELEPHONE ENCOUNTER
Returned call and Kade Gunter states she is having spasms in her left hip; the right one has calmed down. She is getting dizzy /light headed when she goes to stand up and go to the restroom. She states that's when she falls. She will d/c the oxycodone and start tylenol for pain. I will send a message to Dr Linda Ward to advise on left hip spasms. She states she isn't in that much pain but does have spasms and weakness on the left side. Reports falling a couple of times when the hip spasms start.

## 2023-10-18 ENCOUNTER — OFFICE VISIT (OUTPATIENT)
Dept: FAMILY MEDICINE CLINIC | Facility: CLINIC | Age: 68
End: 2023-10-18
Payer: MEDICARE

## 2023-10-18 ENCOUNTER — TELEPHONE (OUTPATIENT)
Dept: ORTHOPEDIC SURGERY | Age: 68
End: 2023-10-18

## 2023-10-18 VITALS
BODY MASS INDEX: 37.12 KG/M2 | HEART RATE: 68 BPM | SYSTOLIC BLOOD PRESSURE: 110 MMHG | WEIGHT: 231 LBS | DIASTOLIC BLOOD PRESSURE: 60 MMHG | OXYGEN SATURATION: 98 % | RESPIRATION RATE: 16 BRPM | HEIGHT: 66 IN

## 2023-10-18 DIAGNOSIS — L50.3 DERMATOGRAPHISM: ICD-10-CM

## 2023-10-18 DIAGNOSIS — R42 DIZZINESS: Primary | ICD-10-CM

## 2023-10-18 DIAGNOSIS — N39.41 URGE INCONTINENCE: ICD-10-CM

## 2023-10-18 DIAGNOSIS — L30.4 INTERTRIGO: ICD-10-CM

## 2023-10-18 LAB
ALBUMIN SERPL-MCNC: 2.9 G/DL (ref 3.2–4.6)
ALBUMIN/GLOB SERPL: 0.9 (ref 0.4–1.6)
ALP SERPL-CCNC: 105 U/L (ref 50–136)
ALT SERPL-CCNC: 25 U/L (ref 12–65)
ANION GAP SERPL CALC-SCNC: 7 MMOL/L (ref 2–11)
AST SERPL-CCNC: 12 U/L (ref 15–37)
BASOPHILS # BLD: 0 K/UL (ref 0–0.2)
BASOPHILS NFR BLD: 0 % (ref 0–2)
BILIRUB SERPL-MCNC: 0.3 MG/DL (ref 0.2–1.1)
BUN SERPL-MCNC: 31 MG/DL (ref 8–23)
CALCIUM SERPL-MCNC: 9.5 MG/DL (ref 8.3–10.4)
CHLORIDE SERPL-SCNC: 109 MMOL/L (ref 101–110)
CO2 SERPL-SCNC: 26 MMOL/L (ref 21–32)
CREAT SERPL-MCNC: 0.8 MG/DL (ref 0.6–1)
DIFFERENTIAL METHOD BLD: ABNORMAL
EOSINOPHIL # BLD: 0.3 K/UL (ref 0–0.8)
EOSINOPHIL NFR BLD: 3 % (ref 0.5–7.8)
ERYTHROCYTE [DISTWIDTH] IN BLOOD BY AUTOMATED COUNT: 14.5 % (ref 11.9–14.6)
GLOBULIN SER CALC-MCNC: 3.2 G/DL (ref 2.8–4.5)
GLUCOSE SERPL-MCNC: 138 MG/DL (ref 65–100)
HCT VFR BLD AUTO: 36.2 % (ref 35.8–46.3)
HGB BLD-MCNC: 11.3 G/DL (ref 11.7–15.4)
IMM GRANULOCYTES # BLD AUTO: 0.4 K/UL (ref 0–0.5)
IMM GRANULOCYTES NFR BLD AUTO: 3 % (ref 0–5)
LYMPHOCYTES # BLD: 1.6 K/UL (ref 0.5–4.6)
LYMPHOCYTES NFR BLD: 15 % (ref 13–44)
MCH RBC QN AUTO: 30 PG (ref 26.1–32.9)
MCHC RBC AUTO-ENTMCNC: 31.2 G/DL (ref 31.4–35)
MCV RBC AUTO: 96 FL (ref 82–102)
MONOCYTES # BLD: 1.1 K/UL (ref 0.1–1.3)
MONOCYTES NFR BLD: 10 % (ref 4–12)
NEUTS SEG # BLD: 7.6 K/UL (ref 1.7–8.2)
NEUTS SEG NFR BLD: 69 % (ref 43–78)
NRBC # BLD: 0.03 K/UL (ref 0–0.2)
PLATELET # BLD AUTO: 470 K/UL (ref 150–450)
PMV BLD AUTO: 10 FL (ref 9.4–12.3)
POTASSIUM SERPL-SCNC: 5.2 MMOL/L (ref 3.5–5.1)
PROT SERPL-MCNC: 6.1 G/DL (ref 6.3–8.2)
RBC # BLD AUTO: 3.77 M/UL (ref 4.05–5.2)
SODIUM SERPL-SCNC: 142 MMOL/L (ref 133–143)
WBC # BLD AUTO: 11.1 K/UL (ref 4.3–11.1)

## 2023-10-18 PROCEDURE — 3017F COLORECTAL CA SCREEN DOC REV: CPT | Performed by: FAMILY MEDICINE

## 2023-10-18 PROCEDURE — 1090F PRES/ABSN URINE INCON ASSESS: CPT | Performed by: FAMILY MEDICINE

## 2023-10-18 PROCEDURE — 1111F DSCHRG MED/CURRENT MED MERGE: CPT | Performed by: FAMILY MEDICINE

## 2023-10-18 PROCEDURE — 1036F TOBACCO NON-USER: CPT | Performed by: FAMILY MEDICINE

## 2023-10-18 PROCEDURE — 99214 OFFICE O/P EST MOD 30 MIN: CPT | Performed by: FAMILY MEDICINE

## 2023-10-18 PROCEDURE — G8399 PT W/DXA RESULTS DOCUMENT: HCPCS | Performed by: FAMILY MEDICINE

## 2023-10-18 PROCEDURE — 1123F ACP DISCUSS/DSCN MKR DOCD: CPT | Performed by: FAMILY MEDICINE

## 2023-10-18 PROCEDURE — G8427 DOCREV CUR MEDS BY ELIG CLIN: HCPCS | Performed by: FAMILY MEDICINE

## 2023-10-18 PROCEDURE — 0509F URINE INCON PLAN DOCD: CPT | Performed by: FAMILY MEDICINE

## 2023-10-18 PROCEDURE — G8484 FLU IMMUNIZE NO ADMIN: HCPCS | Performed by: FAMILY MEDICINE

## 2023-10-18 PROCEDURE — 81003 URINALYSIS AUTO W/O SCOPE: CPT | Performed by: FAMILY MEDICINE

## 2023-10-18 PROCEDURE — G8417 CALC BMI ABV UP PARAM F/U: HCPCS | Performed by: FAMILY MEDICINE

## 2023-10-18 RX ORDER — NYSTATIN 100000 [USP'U]/G
POWDER TOPICAL
Qty: 60 G | Refills: 1 | Status: SHIPPED | OUTPATIENT
Start: 2023-10-18

## 2023-10-18 RX ORDER — TIZANIDINE 2 MG/1
2 TABLET ORAL EVERY 6 HOURS PRN
COMMUNITY
End: 2023-10-20 | Stop reason: SDUPTHER

## 2023-10-18 ASSESSMENT — ENCOUNTER SYMPTOMS
SHORTNESS OF BREATH: 0
BLOOD IN STOOL: 0
ABDOMINAL PAIN: 0
COUGH: 0
NAUSEA: 0
VOMITING: 0

## 2023-10-18 NOTE — PROGRESS NOTES
BP: 130/80   Site: Left Upper Arm   Position: Sitting   Pulse: 68   Resp: 16   SpO2: 98%   Weight: 104.8 kg (231 lb)  Comment: pt stated weight   Height: 1.676 m (5' 6\")          Physical Exam:  Physical Exam  Vitals reviewed. Constitutional:       Appearance: Normal appearance. HENT:      Head: Normocephalic and atraumatic. Cardiovascular:      Rate and Rhythm: Normal rate and regular rhythm. Heart sounds: No murmur heard. Pulmonary:      Effort: Pulmonary effort is normal. No respiratory distress. Breath sounds: Normal breath sounds. No wheezing or rhonchi. Abdominal:      General: There is no distension. Palpations: There is no mass. Tenderness: There is no abdominal tenderness. There is no right CVA tenderness, left CVA tenderness, guarding or rebound. Hernia: No hernia is present. Musculoskeletal:      Cervical back: Neck supple. Right lower leg: No edema. Left lower leg: No edema. Lymphadenopathy:      Cervical: No cervical adenopathy. Skin:     General: Skin is warm and dry. Comments: Macular rash with satellite lesions inferior left lateral breast, she also has a linear marks consistent with scratches on her back   Neurological:      Mental Status: She is alert. Comments: Unable to complete Paulette-Hallpike today secondary to her post surgical status. Psychiatric:         Mood and Affect: Mood normal.         Behavior: Behavior normal.             Bong Hussein DO    This note was generated using Dragon voice recognition software.   There may be medical errors due to computer generated translation

## 2023-10-18 NOTE — TELEPHONE ENCOUNTER
Spoke with Josselin Kohli today and she states hip spasms are much better with baclofen and the pain is tolerable with tylenol. She is still experiencing her blood pressure going up and down. She also states she is still getting very light headed/dizzy with sit to stand positioning. I told her this is not likely directly related to her surgery and advised her to call her primary care physician. I did offer her an appointment earlier this week to be seen, but she states she does not think she can get to the office.  I did tell her that her primary care office may could do virtual. She will call them and call me if anything else changes

## 2023-10-19 LAB
BILIRUBIN, URINE, POC: NEGATIVE
BLOOD URINE, POC: NEGATIVE
GLUCOSE URINE, POC: NEGATIVE
KETONES, URINE, POC: NEGATIVE
LEUKOCYTE ESTERASE, URINE, POC: NEGATIVE
NITRITE, URINE, POC: NEGATIVE
PH, URINE, POC: 6.5 (ref 4.6–8)
PROTEIN,URINE, POC: NEGATIVE
SPECIFIC GRAVITY, URINE, POC: 1.02 (ref 1–1.03)
URINALYSIS CLARITY, POC: CLEAR
URINALYSIS COLOR, POC: YELLOW
UROBILINOGEN, POC: NORMAL

## 2023-10-20 ENCOUNTER — TELEPHONE (OUTPATIENT)
Dept: FAMILY MEDICINE CLINIC | Facility: CLINIC | Age: 68
End: 2023-10-20

## 2023-10-20 RX ORDER — TIZANIDINE 2 MG/1
2 TABLET ORAL 2 TIMES DAILY PRN
Qty: 10 TABLET | Refills: 0 | Status: SHIPPED | OUTPATIENT
Start: 2023-10-20 | End: 2023-10-25

## 2023-10-23 ENCOUNTER — TELEPHONE (OUTPATIENT)
Dept: ORTHOPEDIC SURGERY | Age: 68
End: 2023-10-23

## 2023-10-23 RX ORDER — CELECOXIB 200 MG/1
200 CAPSULE ORAL 2 TIMES DAILY
Qty: 20 CAPSULE | Refills: 0 | Status: SHIPPED | OUTPATIENT
Start: 2023-10-23 | End: 2023-10-27

## 2023-10-23 NOTE — TELEPHONE ENCOUNTER
Discussed with patient that Dr Rossy Blood sent in celebrex but really encouraged her to come into the office prior to her appointment on Friday and either see Klaudia or Efrain Boles. She would rather just to keep her appointment with Dr Rossy Blood on Friday and try to work through the pain.  Instructed her to call if anything changes or she changes her mind on being seen earlier

## 2023-10-23 NOTE — TELEPHONE ENCOUNTER
Sent in Celebrex for inflammation and pain control. Encourage to come in to see klaus/leila tomorrow rather than waiting until Friday.

## 2023-10-23 NOTE — TELEPHONE ENCOUNTER
Patient calling to say her lt hip is in severe pain and having a hard time walking.  Has an appt on Friday but needs to peak with someone before then

## 2023-10-23 NOTE — TELEPHONE ENCOUNTER
Spoke with Azalia Pinedo today. She is still having difficulty with her hip. She takes baclofen at night and tizanidine during the day. Dr Joanna Garcia did say that maybe if she wasn't getting relief we could try a steroid pk or Toradol for inflammation. I will send him a message to advise.  She does have an appointment to see him on Friday

## 2023-10-27 ENCOUNTER — OFFICE VISIT (OUTPATIENT)
Dept: ORTHOPEDIC SURGERY | Age: 68
End: 2023-10-27

## 2023-10-27 DIAGNOSIS — Z98.1 STATUS POST LUMBAR SPINAL ARTHRODESIS: Primary | ICD-10-CM

## 2023-10-27 RX ORDER — CELECOXIB 100 MG/1
100 CAPSULE ORAL 2 TIMES DAILY
Qty: 60 CAPSULE | Refills: 1 | Status: SHIPPED | OUTPATIENT
Start: 2023-10-27

## 2023-10-27 RX ORDER — TIZANIDINE 4 MG/1
4 TABLET ORAL 3 TIMES DAILY PRN
Qty: 60 TABLET | Refills: 0 | Status: SHIPPED | OUTPATIENT
Start: 2023-10-27

## 2023-10-27 NOTE — PROGRESS NOTES
to do everything she can to try to avoid any more falls. Again, I am somewhat concerned that she has already broken down the junctional segment at L1-L2. We will have her follow-up in 4 to 6 weeks and obtain new radiographs to assess for any progression. .         Electronically Signed By Tiffanie Martinez MD   10/27/23  12:11 PM

## 2023-10-31 ENCOUNTER — TELEPHONE (OUTPATIENT)
Dept: FAMILY MEDICINE CLINIC | Facility: CLINIC | Age: 68
End: 2023-10-31

## 2023-10-31 NOTE — TELEPHONE ENCOUNTER
Called [facility], [facility], spoke to Joe. Advised PT was ordered by harini, Dr. Valorie Browning. Joe stated he has the information for Ortho and will give them a call.   Sp Higgins: [phone]  [address]

## 2023-10-31 NOTE — TELEPHONE ENCOUNTER
[facility] requesting last office visit note that lists pt's dx.     FUB:[phone]

## 2023-11-06 DIAGNOSIS — Z98.1 STATUS POST LUMBAR SPINAL ARTHRODESIS: ICD-10-CM

## 2023-11-06 RX ORDER — TIZANIDINE 4 MG/1
4 TABLET ORAL 3 TIMES DAILY PRN
Qty: 60 TABLET | Refills: 0 | OUTPATIENT
Start: 2023-11-06

## 2023-11-09 DIAGNOSIS — Z98.1 STATUS POST LUMBAR SPINAL ARTHRODESIS: ICD-10-CM

## 2023-11-10 RX ORDER — TIZANIDINE 4 MG/1
4 TABLET ORAL 3 TIMES DAILY PRN
Qty: 60 TABLET | Refills: 0 | OUTPATIENT
Start: 2023-11-10

## 2023-12-05 ENCOUNTER — TELEMEDICINE (OUTPATIENT)
Dept: NEUROLOGY | Age: 68
End: 2023-12-05
Payer: MEDICARE

## 2023-12-05 DIAGNOSIS — G25.0 ESSENTIAL TREMOR: Primary | ICD-10-CM

## 2023-12-05 PROCEDURE — G8428 CUR MEDS NOT DOCUMENT: HCPCS | Performed by: PSYCHIATRY & NEUROLOGY

## 2023-12-05 PROCEDURE — 1090F PRES/ABSN URINE INCON ASSESS: CPT | Performed by: PSYCHIATRY & NEUROLOGY

## 2023-12-05 PROCEDURE — 3017F COLORECTAL CA SCREEN DOC REV: CPT | Performed by: PSYCHIATRY & NEUROLOGY

## 2023-12-05 PROCEDURE — 99213 OFFICE O/P EST LOW 20 MIN: CPT | Performed by: PSYCHIATRY & NEUROLOGY

## 2023-12-05 PROCEDURE — G8399 PT W/DXA RESULTS DOCUMENT: HCPCS | Performed by: PSYCHIATRY & NEUROLOGY

## 2023-12-05 PROCEDURE — 1123F ACP DISCUSS/DSCN MKR DOCD: CPT | Performed by: PSYCHIATRY & NEUROLOGY

## 2023-12-05 ASSESSMENT — ENCOUNTER SYMPTOMS
COUGH: 0
ABDOMINAL PAIN: 0

## 2023-12-05 NOTE — PROGRESS NOTES
[facility] Neurology Virtual Video Encounter  [address], [address], [address]  Phone: [phone] Fax [phone]  Provider: Kayla Gavin MD    Patient: David Walter  Date: 12/7/2023    Virtual Encounter     David Walter is a 79 y.o. female who was seen by synchronous (real-time) audio-video technology using [facility] on 12/5/2023. David Walter, was evaluated through a synchronous (real-time) audio-video encounter. The patient (or guardian if applicable) is aware that this is a billable service, which includes applicable co-pays. This Virtual Visit was conducted with patient's (and/or legal guardian's) consent. Patient identification was verified, and a caregiver was present when appropriate. The patient was located at Home: [address]  Provider was located at [facility] ([address]): [address],  [address]    Total time spent for this encounter:  24 minutes    --Nayeli Arroyo MD on 12/7/2023 at 1:13 PM    An electronic signature was used to authenticate this note. History of Present Illness:     David Walter is a 79 y.o. RH female who presents for follow-up of tremor. She is unaccompanied for today's visit. She was last seen May 2023. Patient presents for follow-up and continued management of a tremor of the hands which clinically has been best described as that of a postural and action tremor most consistent with benign essential tremor. Tremor can affect tasks requiring dexterity such as eating with utensils and holding a drink steady. She denies any tremor of the head or voice. Of note, there is a family history of tremor and a paternal uncle but no other family history of PD.      Current medications include:  Primidone 150 mg 4 times a day  Propranolol 20 mg twice a day (patient reports taking it 3 times a day)     Previous medication trials include: Topiramate

## 2023-12-07 DIAGNOSIS — Z98.1 STATUS POST LUMBAR SPINAL ARTHRODESIS: ICD-10-CM

## 2023-12-07 RX ORDER — CELECOXIB 100 MG/1
100 CAPSULE ORAL 2 TIMES DAILY
Qty: 60 CAPSULE | Refills: 1 | OUTPATIENT
Start: 2023-12-07

## 2023-12-11 ENCOUNTER — TELEPHONE (OUTPATIENT)
Dept: ORTHOPEDIC SURGERY | Age: 68
End: 2023-12-11

## 2023-12-13 ENCOUNTER — TELEPHONE (OUTPATIENT)
Dept: ORTHOPEDIC SURGERY | Age: 68
End: 2023-12-13

## 2023-12-13 DIAGNOSIS — Z98.1 STATUS POST LUMBAR SPINAL ARTHRODESIS: Primary | ICD-10-CM

## 2023-12-15 ENCOUNTER — OFFICE VISIT (OUTPATIENT)
Dept: ORTHOPEDIC SURGERY | Age: 68
End: 2023-12-15

## 2023-12-15 DIAGNOSIS — Z98.1 STATUS POST LUMBAR SPINAL ARTHRODESIS: Primary | ICD-10-CM

## 2023-12-15 RX ORDER — CELECOXIB 100 MG/1
100 CAPSULE ORAL 2 TIMES DAILY
Qty: 60 CAPSULE | Refills: 0 | Status: SHIPPED | OUTPATIENT
Start: 2023-12-15

## 2023-12-15 NOTE — PROGRESS NOTES
Name: Diane Ellison  YOB: 1955  Gender: female  MRN: [id number]  Age: 79 y.o. Chief Complaint: Lumbar spine surgery follow up    History of Present Illness:      Diane Ellison  is here for 2 month follow up of her  lumbar TLIF surgery. She reports significant relief of preoperative lower extremity pain, weakness and parasthesias. There is the expected residual back stiffness.            Medications:       Current Outpatient Medications:     celecoxib (CELEBREX) 100 MG capsule, Take 1 capsule by mouth 2 times daily, Disp: 60 capsule, Rfl: 1    venlafaxine (EFFEXOR XR) 75 MG extended release capsule, Take 3 capsules by mouth daily, Disp: , Rfl:     fluticasone-salmeterol (ADVAIR) 250-50 MCG/ACT AEPB diskus inhaler, Inhale 1 puff into the lungs in the morning and 1 puff in the evening., Disp: 1 each, Rfl: 5    fluticasone (FLONASE) 50 MCG/ACT nasal spray, 1 spray by Each Nostril route in the morning and at bedtime, Disp: , Rfl:     Multiple Vitamins-Minerals (CENTRUM ADULTS PO), Take 1 tablet by mouth daily, Disp: , Rfl:     gabapentin (NEURONTIN) 100 MG capsule, Take 2 capsules by mouth every evening., Disp: , Rfl:     Lumateperone Tosylate (CAPLYTA) 42 MG capsule, Take 1 capsule by mouth nightly, Disp: , Rfl:     acetaminophen (TYLENOL) 500 MG tablet, Take 2 tablets by mouth as needed, Disp: , Rfl:     propranolol (INDERAL) 20 MG tablet, Take 1 tablet by mouth 3 times daily, Disp: , Rfl:     escitalopram (LEXAPRO) 20 MG tablet, Take 1 tablet by mouth nightly, Disp: , Rfl:     primidone (MYSOLINE) 50 MG tablet, Take 3 tablets by mouth 4 times daily, Disp: 1080 tablet, Rfl: 3    albuterol sulfate HFA (VENTOLIN HFA) 108 (90 Base) MCG/ACT inhaler, Inhale 2 puffs into the lungs 4 times daily as needed for Wheezing or Shortness of Breath, Disp: 18 g, Rfl: 5    LORazepam (ATIVAN) 1 MG tablet, TAKE 1 TABLET BY MOUTH AT BEDTIME, Disp: , Rfl:     lamoTRIgine (LAMICTAL) 150 MG tablet,

## 2024-01-05 ENCOUNTER — OFFICE VISIT (OUTPATIENT)
Dept: FAMILY MEDICINE CLINIC | Facility: CLINIC | Age: 69
End: 2024-01-05
Payer: MEDICARE

## 2024-01-05 VITALS
WEIGHT: 224 LBS | SYSTOLIC BLOOD PRESSURE: 120 MMHG | HEIGHT: 66 IN | HEART RATE: 68 BPM | RESPIRATION RATE: 16 BRPM | BODY MASS INDEX: 36 KG/M2 | DIASTOLIC BLOOD PRESSURE: 72 MMHG | OXYGEN SATURATION: 96 %

## 2024-01-05 DIAGNOSIS — D64.9 ANEMIA, UNSPECIFIED TYPE: ICD-10-CM

## 2024-01-05 DIAGNOSIS — E78.00 ELEVATED LDL CHOLESTEROL LEVEL: ICD-10-CM

## 2024-01-05 DIAGNOSIS — Z12.31 ENCOUNTER FOR SCREENING MAMMOGRAM FOR BREAST CANCER: ICD-10-CM

## 2024-01-05 DIAGNOSIS — N39.41 URGE INCONTINENCE: ICD-10-CM

## 2024-01-05 DIAGNOSIS — M18.11 ARTHRITIS OF CARPOMETACARPAL (CMC) JOINT OF RIGHT THUMB: ICD-10-CM

## 2024-01-05 DIAGNOSIS — Z12.11 COLON CANCER SCREENING: ICD-10-CM

## 2024-01-05 DIAGNOSIS — J45.909 UNCOMPLICATED ASTHMA, UNSPECIFIED ASTHMA SEVERITY, UNSPECIFIED WHETHER PERSISTENT: ICD-10-CM

## 2024-01-05 DIAGNOSIS — R73.03 PREDIABETES: Primary | ICD-10-CM

## 2024-01-05 PROCEDURE — 1123F ACP DISCUSS/DSCN MKR DOCD: CPT | Performed by: FAMILY MEDICINE

## 2024-01-05 PROCEDURE — G8427 DOCREV CUR MEDS BY ELIG CLIN: HCPCS | Performed by: FAMILY MEDICINE

## 2024-01-05 PROCEDURE — 1090F PRES/ABSN URINE INCON ASSESS: CPT | Performed by: FAMILY MEDICINE

## 2024-01-05 PROCEDURE — G8399 PT W/DXA RESULTS DOCUMENT: HCPCS | Performed by: FAMILY MEDICINE

## 2024-01-05 PROCEDURE — 3017F COLORECTAL CA SCREEN DOC REV: CPT | Performed by: FAMILY MEDICINE

## 2024-01-05 PROCEDURE — G8417 CALC BMI ABV UP PARAM F/U: HCPCS | Performed by: FAMILY MEDICINE

## 2024-01-05 PROCEDURE — 0509F URINE INCON PLAN DOCD: CPT | Performed by: FAMILY MEDICINE

## 2024-01-05 PROCEDURE — 1036F TOBACCO NON-USER: CPT | Performed by: FAMILY MEDICINE

## 2024-01-05 PROCEDURE — 99214 OFFICE O/P EST MOD 30 MIN: CPT | Performed by: FAMILY MEDICINE

## 2024-01-05 PROCEDURE — G8484 FLU IMMUNIZE NO ADMIN: HCPCS | Performed by: FAMILY MEDICINE

## 2024-01-05 RX ORDER — TRAZODONE HYDROCHLORIDE 50 MG/1
TABLET ORAL
COMMUNITY
Start: 2023-12-11

## 2024-01-05 RX ORDER — SOLIFENACIN SUCCINATE 5 MG/1
5 TABLET, FILM COATED ORAL DAILY
Qty: 90 TABLET | Refills: 1 | Status: SHIPPED | OUTPATIENT
Start: 2024-01-05

## 2024-01-05 ASSESSMENT — PATIENT HEALTH QUESTIONNAIRE - PHQ9
1. LITTLE INTEREST OR PLEASURE IN DOING THINGS: 0
SUM OF ALL RESPONSES TO PHQ QUESTIONS 1-9: 0
9. THOUGHTS THAT YOU WOULD BE BETTER OFF DEAD, OR OF HURTING YOURSELF: 0
5. POOR APPETITE OR OVEREATING: 0
3. TROUBLE FALLING OR STAYING ASLEEP: 0
7. TROUBLE CONCENTRATING ON THINGS, SUCH AS READING THE NEWSPAPER OR WATCHING TELEVISION: 0
8. MOVING OR SPEAKING SO SLOWLY THAT OTHER PEOPLE COULD HAVE NOTICED. OR THE OPPOSITE, BEING SO FIGETY OR RESTLESS THAT YOU HAVE BEEN MOVING AROUND A LOT MORE THAN USUAL: 0
4. FEELING TIRED OR HAVING LITTLE ENERGY: 0
SUM OF ALL RESPONSES TO PHQ QUESTIONS 1-9: 0
SUM OF ALL RESPONSES TO PHQ QUESTIONS 1-9: 0
2. FEELING DOWN, DEPRESSED OR HOPELESS: 0
SUM OF ALL RESPONSES TO PHQ QUESTIONS 1-9: 0
10. IF YOU CHECKED OFF ANY PROBLEMS, HOW DIFFICULT HAVE THESE PROBLEMS MADE IT FOR YOU TO DO YOUR WORK, TAKE CARE OF THINGS AT HOME, OR GET ALONG WITH OTHER PEOPLE: 0
SUM OF ALL RESPONSES TO PHQ9 QUESTIONS 1 & 2: 0
6. FEELING BAD ABOUT YOURSELF - OR THAT YOU ARE A FAILURE OR HAVE LET YOURSELF OR YOUR FAMILY DOWN: 0

## 2024-01-05 ASSESSMENT — ENCOUNTER SYMPTOMS
BLOOD IN STOOL: 0
ABDOMINAL PAIN: 0
SHORTNESS OF BREATH: 0
VOMITING: 0
COUGH: 0
NAUSEA: 0

## 2024-01-05 NOTE — PROGRESS NOTES
urination.  She is also having pain at the base of her right thumb.  There are some stiffness.  There is no trigger finger.  She has had that repaired on other fingers before.  She was unable to get lab work prior to her appointment today but she is agreeable to returning to clinic to get that as our technician is not currently here.  She follows with psychiatry.  She does note that she does have some dry eyes and dry mouth already from other medications.  She is agreeable scheduling colonoscopy.  Has been 10 years since her last one which was not done in the area.    Review of Systems:  Review of Systems   Constitutional:  Negative for chills, fever and unexpected weight change.   Respiratory:  Negative for cough and shortness of breath.    Cardiovascular:  Negative for chest pain and leg swelling.   Gastrointestinal:  Negative for abdominal pain, blood in stool, nausea and vomiting.   Genitourinary:  Positive for frequency.   Musculoskeletal:  Positive for arthralgias.         History:  Past Medical History:   Diagnosis Date    Anxiety     Asthma     managed with inhalers    Depression     Elevated LDL cholesterol level     Essential tremor     History of gastric bypass     History of melanoma     removed from Left wrist area    Osteoporosis     Prediabetes     RLS (restless legs syndrome)     Spinal stenosis of lumbar region with neurogenic claudication        Past Surgical History:   Procedure Laterality Date     SECTION      X3    COLONOSCOPY      GASTRIC BYPASS SURGERY      LUMBAR FUSION N/A 10/10/2023    L2-S1 laminectomy and L2-S1 fusion with allograft, transforaminal lumbar interbody fusion, and instrumentation. performed by Alfred Hastings MD at [facility] MAIN OR    MALIGNANT SKIN LESION EXCISION      melanoma removed from Left wrist area    TONSILLECTOMY      TOTAL KNEE ARTHROPLASTY Left     WISDOM TOOTH EXTRACTION         Current Outpatient Medications   Medication Sig Dispense Refill

## 2024-01-24 DIAGNOSIS — Z98.1 STATUS POST LUMBAR SPINAL ARTHRODESIS: ICD-10-CM

## 2024-01-25 RX ORDER — CELECOXIB 100 MG/1
100 CAPSULE ORAL 2 TIMES DAILY
Qty: 60 CAPSULE | Refills: 0 | OUTPATIENT
Start: 2024-01-25

## 2024-02-08 ENCOUNTER — NURSE ONLY (OUTPATIENT)
Dept: FAMILY MEDICINE CLINIC | Facility: CLINIC | Age: 69
End: 2024-02-08

## 2024-02-08 DIAGNOSIS — E66.01 CLASS 2 SEVERE OBESITY DUE TO EXCESS CALORIES WITH SERIOUS COMORBIDITY AND BODY MASS INDEX (BMI) OF 39.0 TO 39.9 IN ADULT (HCC): ICD-10-CM

## 2024-02-08 DIAGNOSIS — D64.9 ANEMIA, UNSPECIFIED TYPE: ICD-10-CM

## 2024-02-08 DIAGNOSIS — E78.00 ELEVATED LDL CHOLESTEROL LEVEL: ICD-10-CM

## 2024-02-08 DIAGNOSIS — R73.03 PREDIABETES: ICD-10-CM

## 2024-02-08 LAB
ALBUMIN SERPL-MCNC: 3.5 G/DL (ref 3.2–4.6)
ALBUMIN/GLOB SERPL: 1.2 (ref 0.4–1.6)
ALP SERPL-CCNC: 145 U/L (ref 50–136)
ALT SERPL-CCNC: 22 U/L (ref 12–65)
ANION GAP SERPL CALC-SCNC: 1 MMOL/L (ref 2–11)
AST SERPL-CCNC: 14 U/L (ref 15–37)
BASOPHILS # BLD: 0 K/UL (ref 0–0.2)
BASOPHILS NFR BLD: 1 % (ref 0–2)
BILIRUB SERPL-MCNC: 0.2 MG/DL (ref 0.2–1.1)
BUN SERPL-MCNC: 39 MG/DL (ref 8–23)
CALCIUM SERPL-MCNC: 10 MG/DL (ref 8.3–10.4)
CHLORIDE SERPL-SCNC: 109 MMOL/L (ref 103–113)
CHOLEST SERPL-MCNC: 210 MG/DL
CO2 SERPL-SCNC: 29 MMOL/L (ref 21–32)
CREAT SERPL-MCNC: 0.7 MG/DL (ref 0.6–1)
DIFFERENTIAL METHOD BLD: ABNORMAL
EOSINOPHIL # BLD: 0.2 K/UL (ref 0–0.8)
EOSINOPHIL NFR BLD: 4 % (ref 0.5–7.8)
ERYTHROCYTE [DISTWIDTH] IN BLOOD BY AUTOMATED COUNT: 16.4 % (ref 11.9–14.6)
FERRITIN SERPL-MCNC: 8 NG/ML (ref 8–388)
GLOBULIN SER CALC-MCNC: 2.9 G/DL (ref 2.8–4.5)
GLUCOSE SERPL-MCNC: 111 MG/DL (ref 65–100)
HCT VFR BLD AUTO: 37.5 % (ref 35.8–46.3)
HDLC SERPL-MCNC: 73 MG/DL (ref 40–60)
HDLC SERPL: 2.9
HGB BLD-MCNC: 10.7 G/DL (ref 11.7–15.4)
IMM GRANULOCYTES # BLD AUTO: 0 K/UL (ref 0–0.5)
IMM GRANULOCYTES NFR BLD AUTO: 0 % (ref 0–5)
IRON SATN MFR SERPL: 7 %
IRON SERPL-MCNC: 25 UG/DL (ref 35–150)
LDLC SERPL CALC-MCNC: 112.4 MG/DL
LYMPHOCYTES # BLD: 1.1 K/UL (ref 0.5–4.6)
LYMPHOCYTES NFR BLD: 20 % (ref 13–44)
MCH RBC QN AUTO: 25.1 PG (ref 26.1–32.9)
MCHC RBC AUTO-ENTMCNC: 28.5 G/DL (ref 31.4–35)
MCV RBC AUTO: 87.8 FL (ref 82–102)
MONOCYTES # BLD: 0.5 K/UL (ref 0.1–1.3)
MONOCYTES NFR BLD: 8 % (ref 4–12)
NEUTS SEG # BLD: 3.8 K/UL (ref 1.7–8.2)
NEUTS SEG NFR BLD: 67 % (ref 43–78)
NRBC # BLD: 0 K/UL (ref 0–0.2)
PLATELET # BLD AUTO: 341 K/UL (ref 150–450)
PMV BLD AUTO: 10.3 FL (ref 9.4–12.3)
POTASSIUM SERPL-SCNC: 5.1 MMOL/L (ref 3.5–5.1)
PROT SERPL-MCNC: 6.4 G/DL (ref 6.3–8.2)
RBC # BLD AUTO: 4.27 M/UL (ref 4.05–5.2)
SODIUM SERPL-SCNC: 139 MMOL/L (ref 136–146)
TIBC SERPL-MCNC: 354 UG/DL (ref 250–450)
TRIGL SERPL-MCNC: 123 MG/DL (ref 35–150)
VLDLC SERPL CALC-MCNC: 24.6 MG/DL (ref 6–23)
WBC # BLD AUTO: 5.7 K/UL (ref 4.3–11.1)

## 2024-02-09 DIAGNOSIS — D50.9 IRON DEFICIENCY ANEMIA, UNSPECIFIED IRON DEFICIENCY ANEMIA TYPE: Primary | ICD-10-CM

## 2024-02-09 LAB
EST. AVERAGE GLUCOSE BLD GHB EST-MCNC: 117 MG/DL
HBA1C MFR BLD: 5.7 % (ref 4.8–5.6)

## 2024-02-09 RX ORDER — FERROUS SULFATE 325(65) MG
325 TABLET ORAL
Qty: 90 TABLET | Refills: 1 | Status: SHIPPED | OUTPATIENT
Start: 2024-02-09

## 2024-03-15 ENCOUNTER — OFFICE VISIT (OUTPATIENT)
Dept: ORTHOPEDIC SURGERY | Age: 69
End: 2024-03-15
Payer: MEDICARE

## 2024-03-15 DIAGNOSIS — Z98.1 STATUS POST LUMBAR SPINAL ARTHRODESIS: Primary | ICD-10-CM

## 2024-03-15 PROCEDURE — G8484 FLU IMMUNIZE NO ADMIN: HCPCS | Performed by: ORTHOPAEDIC SURGERY

## 2024-03-15 PROCEDURE — G8417 CALC BMI ABV UP PARAM F/U: HCPCS | Performed by: ORTHOPAEDIC SURGERY

## 2024-03-15 PROCEDURE — G8399 PT W/DXA RESULTS DOCUMENT: HCPCS | Performed by: ORTHOPAEDIC SURGERY

## 2024-03-15 PROCEDURE — 1036F TOBACCO NON-USER: CPT | Performed by: ORTHOPAEDIC SURGERY

## 2024-03-15 PROCEDURE — 1090F PRES/ABSN URINE INCON ASSESS: CPT | Performed by: ORTHOPAEDIC SURGERY

## 2024-03-15 PROCEDURE — G8427 DOCREV CUR MEDS BY ELIG CLIN: HCPCS | Performed by: ORTHOPAEDIC SURGERY

## 2024-03-15 PROCEDURE — 99213 OFFICE O/P EST LOW 20 MIN: CPT | Performed by: ORTHOPAEDIC SURGERY

## 2024-03-15 PROCEDURE — 3017F COLORECTAL CA SCREEN DOC REV: CPT | Performed by: ORTHOPAEDIC SURGERY

## 2024-03-15 PROCEDURE — 1123F ACP DISCUSS/DSCN MKR DOCD: CPT | Performed by: ORTHOPAEDIC SURGERY

## 2024-03-15 NOTE — PROGRESS NOTES
LUMBAR SPINE (2-3 VIEWS)          Assessment/Plan:      This patient is following the expected course following the spine surgery.     She can continue to gradually increase activities as tolerated.  I will have her return for follow up in 6 months.          Electronically Signed By Alfred Tijerina MD   03/15/24  10:14 AM

## 2024-03-21 ENCOUNTER — HOSPITAL ENCOUNTER (OUTPATIENT)
Dept: MAMMOGRAPHY | Age: 69
Discharge: HOME | End: 2024-03-21
Attending: FAMILY MEDICINE
Payer: MEDICARE

## 2024-03-21 DIAGNOSIS — Z12.31 ENCOUNTER FOR SCREENING MAMMOGRAM FOR BREAST CANCER: ICD-10-CM

## 2024-03-21 PROCEDURE — 77063 BREAST TOMOSYNTHESIS BI: CPT

## 2024-04-22 ENCOUNTER — OFFICE VISIT (OUTPATIENT)
Dept: FAMILY MEDICINE CLINIC | Facility: CLINIC | Age: 69
End: 2024-04-22
Payer: MEDICARE

## 2024-04-22 VITALS
SYSTOLIC BLOOD PRESSURE: 124 MMHG | OXYGEN SATURATION: 96 % | WEIGHT: 223.6 LBS | HEIGHT: 66 IN | RESPIRATION RATE: 16 BRPM | BODY MASS INDEX: 35.93 KG/M2 | DIASTOLIC BLOOD PRESSURE: 72 MMHG | HEART RATE: 71 BPM

## 2024-04-22 DIAGNOSIS — D50.9 IRON DEFICIENCY ANEMIA, UNSPECIFIED IRON DEFICIENCY ANEMIA TYPE: ICD-10-CM

## 2024-04-22 DIAGNOSIS — N39.41 URGE INCONTINENCE: Primary | ICD-10-CM

## 2024-04-22 LAB
BASOPHILS # BLD: 0 K/UL (ref 0–0.2)
BASOPHILS NFR BLD: 1 % (ref 0–2)
DIFFERENTIAL METHOD BLD: ABNORMAL
EOSINOPHIL # BLD: 0.3 K/UL (ref 0–0.8)
EOSINOPHIL NFR BLD: 5 % (ref 0.5–7.8)
ERYTHROCYTE [DISTWIDTH] IN BLOOD BY AUTOMATED COUNT: 19.9 % (ref 11.9–14.6)
FERRITIN SERPL-MCNC: 11 NG/ML (ref 8–388)
HCT VFR BLD AUTO: 39.2 % (ref 35.8–46.3)
HGB BLD-MCNC: 11.7 G/DL (ref 11.7–15.4)
IMM GRANULOCYTES # BLD AUTO: 0 K/UL (ref 0–0.5)
IMM GRANULOCYTES NFR BLD AUTO: 0 % (ref 0–5)
IRON SATN MFR SERPL: 16 %
IRON SERPL-MCNC: 53 UG/DL (ref 35–150)
LYMPHOCYTES # BLD: 1.1 K/UL (ref 0.5–4.6)
LYMPHOCYTES NFR BLD: 22 % (ref 13–44)
MCH RBC QN AUTO: 27 PG (ref 26.1–32.9)
MCHC RBC AUTO-ENTMCNC: 29.8 G/DL (ref 31.4–35)
MCV RBC AUTO: 90.3 FL (ref 82–102)
MONOCYTES # BLD: 0.4 K/UL (ref 0.1–1.3)
MONOCYTES NFR BLD: 9 % (ref 4–12)
NEUTS SEG # BLD: 3.1 K/UL (ref 1.7–8.2)
NEUTS SEG NFR BLD: 64 % (ref 43–78)
NRBC # BLD: 0 K/UL (ref 0–0.2)
PLATELET # BLD AUTO: 309 K/UL (ref 150–450)
PMV BLD AUTO: 9.9 FL (ref 9.4–12.3)
RBC # BLD AUTO: 4.34 M/UL (ref 4.05–5.2)
TIBC SERPL-MCNC: 326 UG/DL (ref 250–450)
WBC # BLD AUTO: 4.9 K/UL (ref 4.3–11.1)

## 2024-04-22 PROCEDURE — 1123F ACP DISCUSS/DSCN MKR DOCD: CPT | Performed by: FAMILY MEDICINE

## 2024-04-22 PROCEDURE — 1036F TOBACCO NON-USER: CPT | Performed by: FAMILY MEDICINE

## 2024-04-22 PROCEDURE — 99214 OFFICE O/P EST MOD 30 MIN: CPT | Performed by: FAMILY MEDICINE

## 2024-04-22 PROCEDURE — 0509F URINE INCON PLAN DOCD: CPT | Performed by: FAMILY MEDICINE

## 2024-04-22 PROCEDURE — 3017F COLORECTAL CA SCREEN DOC REV: CPT | Performed by: FAMILY MEDICINE

## 2024-04-22 PROCEDURE — G8417 CALC BMI ABV UP PARAM F/U: HCPCS | Performed by: FAMILY MEDICINE

## 2024-04-22 PROCEDURE — G8399 PT W/DXA RESULTS DOCUMENT: HCPCS | Performed by: FAMILY MEDICINE

## 2024-04-22 PROCEDURE — 1090F PRES/ABSN URINE INCON ASSESS: CPT | Performed by: FAMILY MEDICINE

## 2024-04-22 PROCEDURE — G8427 DOCREV CUR MEDS BY ELIG CLIN: HCPCS | Performed by: FAMILY MEDICINE

## 2024-04-22 RX ORDER — SOLIFENACIN SUCCINATE 10 MG/1
10 TABLET, FILM COATED ORAL DAILY
Qty: 90 TABLET | Refills: 0 | Status: SHIPPED | OUTPATIENT
Start: 2024-04-22

## 2024-04-22 NOTE — PROGRESS NOTES
[facility]  Connie Lujan Thiago, DO  [address] [place] [address]  Ph No:  [phone]  Fax:  [phone]        Assessment/Plan:   Praveena was seen today for medication adjustment.    Diagnoses and all orders for this visit:    Urge incontinence  Uncontrolled symptoms.  Increasing dose from 5 up to 10 mg.  Advised patient monitor for dry eyes, dry mouth or constipation.  Does increase risk of falls.  If not effective may try Myrbetriq.  Patient to contact the office in about a month  -     solifenacin (VESICARE) 10 MG tablet; Take 1 tablet by mouth daily For bladder symptoms    Iron deficiency anemia, unspecified iron deficiency anemia type  Await labs today.  Patient did have surgery prior to this starting.  Await labs to assess trend  -     CBC with Auto Differential  -     Ferritin  -     Transferrin Saturation        Labs:  No results found for this visit on 04/22/24.            Praveena Echavarria is a 68 y.o. female who is seen for evaluation of   Chief Complaint   Patient presents with    Medication Adjustment     Discuss Vesicare dose  Pt stated she is still only sleeping a couple hours at night.        HPI:   Still waking up frequently despite starting Vesicare and having to use the bathroom.  She does not feel that she has urgency.  No dysuria or hematuria.  She thinks the urgency may have improved but she does not remember how bad it was prior to starting the medication.  No considerable dry mouth, dry eyes or constipation that she can think of.  She is agreeable to getting blood work today to follow-up on anemia that was noted after back surgery.  She notes a recent back muscle spasm that led to her using Celebrex and gabapentin again.  She states is actually much better and at this point does not feel need to do any further intervention such as physical therapy or see her surgeon again    Review of Systems:  Review of Systems   Constitutional:  Negative for

## 2024-06-03 DIAGNOSIS — J30.2 OTHER SEASONAL ALLERGIC RHINITIS: ICD-10-CM

## 2024-06-03 RX ORDER — FLUTICASONE PROPIONATE 50 MCG
2 SPRAY, SUSPENSION (ML) NASAL DAILY
Qty: 3 EACH | Refills: 3 | Status: SHIPPED | OUTPATIENT
Start: 2024-06-03

## 2024-06-28 DIAGNOSIS — N39.41 URGE INCONTINENCE: ICD-10-CM

## 2024-06-28 RX ORDER — SOLIFENACIN SUCCINATE 5 MG/1
5 TABLET, FILM COATED ORAL DAILY
Qty: 90 TABLET | Refills: 1 | OUTPATIENT
Start: 2024-06-28

## 2024-07-09 ENCOUNTER — TELEPHONE (OUTPATIENT)
Dept: FAMILY MEDICINE CLINIC | Facility: CLINIC | Age: 69
End: 2024-07-09

## 2024-07-16 ENCOUNTER — OFFICE VISIT (OUTPATIENT)
Dept: FAMILY MEDICINE CLINIC | Facility: CLINIC | Age: 69
End: 2024-07-16
Payer: MEDICARE

## 2024-07-16 VITALS
WEIGHT: 223.8 LBS | HEART RATE: 72 BPM | BODY MASS INDEX: 35.97 KG/M2 | DIASTOLIC BLOOD PRESSURE: 68 MMHG | RESPIRATION RATE: 16 BRPM | HEIGHT: 66 IN | SYSTOLIC BLOOD PRESSURE: 116 MMHG | OXYGEN SATURATION: 94 %

## 2024-07-16 DIAGNOSIS — J45.909 UNCOMPLICATED ASTHMA, UNSPECIFIED ASTHMA SEVERITY, UNSPECIFIED WHETHER PERSISTENT: ICD-10-CM

## 2024-07-16 DIAGNOSIS — Z12.11 COLON CANCER SCREENING: ICD-10-CM

## 2024-07-16 DIAGNOSIS — G89.29 CHRONIC BILATERAL LOW BACK PAIN WITHOUT SCIATICA: ICD-10-CM

## 2024-07-16 DIAGNOSIS — R73.03 PREDIABETES: Primary | ICD-10-CM

## 2024-07-16 DIAGNOSIS — Z00.00 MEDICARE ANNUAL WELLNESS VISIT, SUBSEQUENT: ICD-10-CM

## 2024-07-16 DIAGNOSIS — N39.41 URGE INCONTINENCE: ICD-10-CM

## 2024-07-16 DIAGNOSIS — E78.00 ELEVATED LDL CHOLESTEROL LEVEL: ICD-10-CM

## 2024-07-16 DIAGNOSIS — M54.50 CHRONIC BILATERAL LOW BACK PAIN WITHOUT SCIATICA: ICD-10-CM

## 2024-07-16 LAB
ALBUMIN SERPL-MCNC: 4 G/DL (ref 3.2–4.6)
ALBUMIN/GLOB SERPL: 1.7 (ref 1–1.9)
ALP SERPL-CCNC: 102 U/L (ref 35–104)
ALT SERPL-CCNC: 21 U/L (ref 12–65)
ANION GAP SERPL CALC-SCNC: 10 MMOL/L (ref 9–18)
AST SERPL-CCNC: 23 U/L (ref 15–37)
BASOPHILS # BLD: 0 K/UL (ref 0–0.2)
BASOPHILS NFR BLD: 1 % (ref 0–2)
BILIRUB SERPL-MCNC: <0.2 MG/DL (ref 0–1.2)
BUN SERPL-MCNC: 44 MG/DL (ref 8–23)
CALCIUM SERPL-MCNC: 9.6 MG/DL (ref 8.8–10.2)
CHLORIDE SERPL-SCNC: 104 MMOL/L (ref 98–107)
CHOLEST SERPL-MCNC: 219 MG/DL (ref 0–200)
CO2 SERPL-SCNC: 25 MMOL/L (ref 20–28)
CREAT SERPL-MCNC: 0.68 MG/DL (ref 0.6–1.1)
DIFFERENTIAL METHOD BLD: ABNORMAL
EOSINOPHIL # BLD: 0.3 K/UL (ref 0–0.8)
EOSINOPHIL NFR BLD: 4 % (ref 0.5–7.8)
ERYTHROCYTE [DISTWIDTH] IN BLOOD BY AUTOMATED COUNT: 14.7 % (ref 11.9–14.6)
EST. AVERAGE GLUCOSE BLD GHB EST-MCNC: 109 MG/DL
GLOBULIN SER CALC-MCNC: 2.4 G/DL (ref 2.3–3.5)
GLUCOSE SERPL-MCNC: 82 MG/DL (ref 70–99)
HBA1C MFR BLD: 5.4 % (ref 0–5.6)
HCT VFR BLD AUTO: 39.7 % (ref 35.8–46.3)
HDLC SERPL-MCNC: 64 MG/DL (ref 40–60)
HDLC SERPL: 3.4 (ref 0–5)
HGB BLD-MCNC: 12 G/DL (ref 11.7–15.4)
IMM GRANULOCYTES # BLD AUTO: 0 K/UL (ref 0–0.5)
IMM GRANULOCYTES NFR BLD AUTO: 0 % (ref 0–5)
LDLC SERPL CALC-MCNC: 115 MG/DL (ref 0–100)
LYMPHOCYTES # BLD: 1.3 K/UL (ref 0.5–4.6)
LYMPHOCYTES NFR BLD: 23 % (ref 13–44)
MCH RBC QN AUTO: 30.1 PG (ref 26.1–32.9)
MCHC RBC AUTO-ENTMCNC: 30.2 G/DL (ref 31.4–35)
MCV RBC AUTO: 99.5 FL (ref 82–102)
MONOCYTES # BLD: 0.5 K/UL (ref 0.1–1.3)
MONOCYTES NFR BLD: 8 % (ref 4–12)
NEUTS SEG # BLD: 3.6 K/UL (ref 1.7–8.2)
NEUTS SEG NFR BLD: 64 % (ref 43–78)
NRBC # BLD: 0 K/UL (ref 0–0.2)
PLATELET # BLD AUTO: 280 K/UL (ref 150–450)
PMV BLD AUTO: 10.3 FL (ref 9.4–12.3)
POTASSIUM SERPL-SCNC: 5.3 MMOL/L (ref 3.5–5.1)
PROT SERPL-MCNC: 6.4 G/DL (ref 6.3–8.2)
RBC # BLD AUTO: 3.99 M/UL (ref 4.05–5.2)
SODIUM SERPL-SCNC: 139 MMOL/L (ref 136–145)
TRIGL SERPL-MCNC: 202 MG/DL (ref 0–150)
VLDLC SERPL CALC-MCNC: 40 MG/DL (ref 6–23)
WBC # BLD AUTO: 5.7 K/UL (ref 4.3–11.1)

## 2024-07-16 PROCEDURE — 1036F TOBACCO NON-USER: CPT | Performed by: FAMILY MEDICINE

## 2024-07-16 PROCEDURE — 1090F PRES/ABSN URINE INCON ASSESS: CPT | Performed by: FAMILY MEDICINE

## 2024-07-16 PROCEDURE — G8417 CALC BMI ABV UP PARAM F/U: HCPCS | Performed by: FAMILY MEDICINE

## 2024-07-16 PROCEDURE — G8427 DOCREV CUR MEDS BY ELIG CLIN: HCPCS | Performed by: FAMILY MEDICINE

## 2024-07-16 PROCEDURE — 3017F COLORECTAL CA SCREEN DOC REV: CPT | Performed by: FAMILY MEDICINE

## 2024-07-16 PROCEDURE — G8399 PT W/DXA RESULTS DOCUMENT: HCPCS | Performed by: FAMILY MEDICINE

## 2024-07-16 PROCEDURE — 1123F ACP DISCUSS/DSCN MKR DOCD: CPT | Performed by: FAMILY MEDICINE

## 2024-07-16 PROCEDURE — 0509F URINE INCON PLAN DOCD: CPT | Performed by: FAMILY MEDICINE

## 2024-07-16 PROCEDURE — 99214 OFFICE O/P EST MOD 30 MIN: CPT | Performed by: FAMILY MEDICINE

## 2024-07-16 PROCEDURE — G0439 PPPS, SUBSEQ VISIT: HCPCS | Performed by: FAMILY MEDICINE

## 2024-07-16 RX ORDER — DOXEPIN HYDROCHLORIDE 10 MG/1
10 CAPSULE ORAL NIGHTLY
COMMUNITY
Start: 2024-06-26

## 2024-07-16 RX ORDER — PAROXETINE 30 MG/1
30 TABLET, FILM COATED ORAL NIGHTLY
COMMUNITY
Start: 2024-06-26

## 2024-07-16 RX ORDER — BETAMETHASONE DIPROPIONATE 0.5 MG/G
CREAM TOPICAL
COMMUNITY
Start: 2024-07-02

## 2024-07-16 RX ORDER — GABAPENTIN 300 MG/1
300 CAPSULE ORAL DAILY
COMMUNITY
Start: 2024-06-26

## 2024-07-16 RX ORDER — KETOCONAZOLE 20 MG/ML
SHAMPOO TOPICAL
COMMUNITY
Start: 2024-07-02

## 2024-07-16 RX ORDER — BETAMETHASONE DIPROPIONATE 0.5 MG/G
LOTION TOPICAL
COMMUNITY
Start: 2024-07-02

## 2024-07-16 RX ORDER — FLUTICASONE PROPIONATE AND SALMETEROL 250; 50 UG/1; UG/1
1 POWDER RESPIRATORY (INHALATION) EVERY 12 HOURS
Qty: 1 EACH | Refills: 5 | Status: SHIPPED | OUTPATIENT
Start: 2024-07-16 | End: 2024-07-18

## 2024-07-16 SDOH — ECONOMIC STABILITY: FOOD INSECURITY: WITHIN THE PAST 12 MONTHS, THE FOOD YOU BOUGHT JUST DIDN'T LAST AND YOU DIDN'T HAVE MONEY TO GET MORE.: NEVER TRUE

## 2024-07-16 SDOH — ECONOMIC STABILITY: FOOD INSECURITY: WITHIN THE PAST 12 MONTHS, YOU WORRIED THAT YOUR FOOD WOULD RUN OUT BEFORE YOU GOT MONEY TO BUY MORE.: NEVER TRUE

## 2024-07-16 SDOH — ECONOMIC STABILITY: INCOME INSECURITY: HOW HARD IS IT FOR YOU TO PAY FOR THE VERY BASICS LIKE FOOD, HOUSING, MEDICAL CARE, AND HEATING?: NOT HARD AT ALL

## 2024-07-16 ASSESSMENT — PATIENT HEALTH QUESTIONNAIRE - PHQ9
3. TROUBLE FALLING OR STAYING ASLEEP: NOT AT ALL
SUM OF ALL RESPONSES TO PHQ QUESTIONS 1-9: 0
SUM OF ALL RESPONSES TO PHQ QUESTIONS 1-9: 0
1. LITTLE INTEREST OR PLEASURE IN DOING THINGS: NOT AT ALL
4. FEELING TIRED OR HAVING LITTLE ENERGY: NOT AT ALL
SUM OF ALL RESPONSES TO PHQ QUESTIONS 1-9: 0
SUM OF ALL RESPONSES TO PHQ QUESTIONS 1-9: 0
6. FEELING BAD ABOUT YOURSELF - OR THAT YOU ARE A FAILURE OR HAVE LET YOURSELF OR YOUR FAMILY DOWN: NOT AT ALL
9. THOUGHTS THAT YOU WOULD BE BETTER OFF DEAD, OR OF HURTING YOURSELF: NOT AT ALL
7. TROUBLE CONCENTRATING ON THINGS, SUCH AS READING THE NEWSPAPER OR WATCHING TELEVISION: NOT AT ALL
8. MOVING OR SPEAKING SO SLOWLY THAT OTHER PEOPLE COULD HAVE NOTICED. OR THE OPPOSITE, BEING SO FIGETY OR RESTLESS THAT YOU HAVE BEEN MOVING AROUND A LOT MORE THAN USUAL: NOT AT ALL
5. POOR APPETITE OR OVEREATING: NOT AT ALL
2. FEELING DOWN, DEPRESSED OR HOPELESS: NOT AT ALL
10. IF YOU CHECKED OFF ANY PROBLEMS, HOW DIFFICULT HAVE THESE PROBLEMS MADE IT FOR YOU TO DO YOUR WORK, TAKE CARE OF THINGS AT HOME, OR GET ALONG WITH OTHER PEOPLE: NOT DIFFICULT AT ALL
SUM OF ALL RESPONSES TO PHQ9 QUESTIONS 1 & 2: 0

## 2024-07-16 ASSESSMENT — ENCOUNTER SYMPTOMS
VOMITING: 0
NAUSEA: 0
BLOOD IN STOOL: 0
COUGH: 0
SHORTNESS OF BREATH: 0
ABDOMINAL PAIN: 0

## 2024-07-16 NOTE — PROGRESS NOTES
Medicare Annual Wellness Visit    Praveena Echavarria is here for Follow-up (6 month follow up: asthma ) and Medicare AWV    Assessment & Plan   Prediabetes  -     Hemoglobin A1C  -     Lipid Panel  -     Comprehensive Metabolic Panel  -     CBC with Auto Differential  Elevated LDL cholesterol level  -     Hemoglobin A1C  -     Lipid Panel  -     Comprehensive Metabolic Panel  -     CBC with Auto Differential  Urge incontinence  Chronic bilateral low back pain without sciatica  -     External Referral to Physical Therapy  Medicare annual wellness visit, subsequent  Uncomplicated asthma, unspecified asthma severity, unspecified whether persistent  -     fluticasone-salmeterol (ADVAIR) 250-50 MCG/ACT AEPB diskus inhaler; Inhale 1 puff into the lungs in the morning and 1 puff in the evening., Disp-1 each, R-5Normal  Colon cancer screening  Recommendations for Preventive Services Due: see orders and patient instructions/AVS.  Recommended screening schedule for the next 5-10 years is provided to the patient in written form: see Patient Instructions/AVS.     Return in about 6 months (around 1/16/2025) for PreDM, asthma-labs prior .     Subjective       Patient's complete Health Risk Assessment and screening values have been reviewed and are found in Flowsheets. The following problems were reviewed today and where indicated follow up appointments were made and/or referrals ordered.    Positive Risk Factor Screenings with Interventions:    Fall Risk:  Do you feel unsteady or are you worried about falling? : (!) yes  2 or more falls in past year?: no  Fall with injury in past year?: no     Interventions:    Referral: Physical Therapy (PT)  See AVS for additional education material             Abnormal BMI (obese):  Body mass index is 36.12 kg/m². (!) Abnormal  Interventions:  See AVS for additional education material                           Objective   Vitals:    07/16/24 1556   BP: 116/68   Site: Left Upper Arm 
General: Skin is warm and dry.   Neurological:      Mental Status: She is alert.   Psychiatric:         Mood and Affect: Mood normal.         Behavior: Behavior normal.             Connie Das DO    This note was generated using Dragon voice recognition software.  There may be medical errors due to computer generated translation

## 2024-07-17 DIAGNOSIS — G25.0 ESSENTIAL TREMOR: Primary | ICD-10-CM

## 2024-07-17 RX ORDER — PROPRANOLOL HYDROCHLORIDE 20 MG/1
20 TABLET ORAL 3 TIMES DAILY
Qty: 90 TABLET | Refills: 5 | Status: SHIPPED | OUTPATIENT
Start: 2024-07-17

## 2024-07-18 RX ORDER — FLUTICASONE PROPIONATE AND SALMETEROL XINAFOATE 45; 21 UG/1; UG/1
2 AEROSOL, METERED RESPIRATORY (INHALATION) 2 TIMES DAILY
Qty: 1 EACH | Refills: 5 | Status: SHIPPED | OUTPATIENT
Start: 2024-07-18

## 2024-09-16 ENCOUNTER — OFFICE VISIT (OUTPATIENT)
Age: 69
End: 2024-09-16
Payer: MEDICARE

## 2024-09-16 DIAGNOSIS — Z98.1 STATUS POST LUMBAR SPINAL ARTHRODESIS: Primary | ICD-10-CM

## 2024-09-16 PROCEDURE — 99213 OFFICE O/P EST LOW 20 MIN: CPT | Performed by: ORTHOPAEDIC SURGERY

## 2024-09-16 PROCEDURE — 1123F ACP DISCUSS/DSCN MKR DOCD: CPT | Performed by: ORTHOPAEDIC SURGERY

## 2024-09-16 PROCEDURE — 3017F COLORECTAL CA SCREEN DOC REV: CPT | Performed by: ORTHOPAEDIC SURGERY

## 2024-09-16 PROCEDURE — G8417 CALC BMI ABV UP PARAM F/U: HCPCS | Performed by: ORTHOPAEDIC SURGERY

## 2024-09-16 PROCEDURE — G8399 PT W/DXA RESULTS DOCUMENT: HCPCS | Performed by: ORTHOPAEDIC SURGERY

## 2024-09-16 PROCEDURE — 1090F PRES/ABSN URINE INCON ASSESS: CPT | Performed by: ORTHOPAEDIC SURGERY

## 2024-09-16 PROCEDURE — 1036F TOBACCO NON-USER: CPT | Performed by: ORTHOPAEDIC SURGERY

## 2024-09-16 PROCEDURE — G8427 DOCREV CUR MEDS BY ELIG CLIN: HCPCS | Performed by: ORTHOPAEDIC SURGERY

## 2024-10-12 DIAGNOSIS — G25.0 ESSENTIAL TREMOR: ICD-10-CM

## 2024-10-14 RX ORDER — PROPRANOLOL HCL 20 MG
20 TABLET ORAL 3 TIMES DAILY
Qty: 270 TABLET | Refills: 1 | OUTPATIENT
Start: 2024-10-14

## 2024-10-23 ENCOUNTER — TELEPHONE (OUTPATIENT)
Dept: FAMILY MEDICINE CLINIC | Facility: CLINIC | Age: 69
End: 2024-10-23

## 2024-10-23 NOTE — TELEPHONE ENCOUNTER
Pt complains of sore throat and runny nose x 2 days. No other sx. Requesting to be seen any time tomorrow if possible.

## 2024-10-25 ENCOUNTER — OFFICE VISIT (OUTPATIENT)
Dept: FAMILY MEDICINE CLINIC | Facility: CLINIC | Age: 69
End: 2024-10-25

## 2024-10-25 VITALS
DIASTOLIC BLOOD PRESSURE: 70 MMHG | WEIGHT: 226.8 LBS | TEMPERATURE: 99 F | OXYGEN SATURATION: 96 % | HEIGHT: 66 IN | SYSTOLIC BLOOD PRESSURE: 118 MMHG | HEART RATE: 71 BPM | BODY MASS INDEX: 36.45 KG/M2 | RESPIRATION RATE: 16 BRPM

## 2024-10-25 DIAGNOSIS — Z12.11 COLON CANCER SCREENING: ICD-10-CM

## 2024-10-25 DIAGNOSIS — J06.9 ACUTE URI: Primary | ICD-10-CM

## 2024-10-25 LAB
EXP DATE SOLUTION: NORMAL
EXP DATE SWAB: NORMAL
EXPIRATION DATE: NORMAL
LOT NUMBER POC: NORMAL
LOT NUMBER SOLUTION: NORMAL
LOT NUMBER SWAB: NORMAL
QUICKVUE INFLUENZA TEST: NEGATIVE
RSV RNA, POC: NEGATIVE
SARS-COV-2 RNA, POC: NEGATIVE
VALID INTERNAL CONTROL, POC: YES
VALID INTERNAL CONTROL, POC: YES

## 2024-10-25 ASSESSMENT — ENCOUNTER SYMPTOMS
SORE THROAT: 0
VOICE CHANGE: 1
CONSTIPATION: 0
DIARRHEA: 0
WHEEZING: 0
SHORTNESS OF BREATH: 0
RHINORRHEA: 1
NAUSEA: 0

## 2024-10-25 NOTE — PROGRESS NOTES
[facility]  Connie Lujan Thiago, DO  [address] [place] [address]  Ph No:  [phone]  Fax:  [phone]        Assessment/Plan:   Praveena was seen today for pharyngitis.    Diagnoses and all orders for this visit:    Acute URI  Rapid influenza RSV and COVID are negative.  Though symptoms are consistent with a viral etiology.  Advised tincture of time and supportive care.  Advise using the Advair twice daily while acutely ill and albuterol if needed for chest tightness or wheeze.  She does have a serous otitis media advise using Benzedrex, nasal saline and restarting Flonase to help with symptoms.  Advised returning to clinic with worsening ear pain or fever/chills or other concerning symptoms.  -     AMB POC COVID-19 COV  -     AMB POC RSV  -     AMB POC RAPID INFLUENZA TEST    Colon cancer screening  States she is ready to schedule colonoscopy.  -     [facility]        Labs:  Results for orders placed or performed in visit on 10/25/24   AMB POC COVID-19 COV   Result Value Ref Range    SARS-COV-2 RNA, POC Negative     Lot number swab      EXP date swab      Lot number solution      EXP date solution      LOT NUMBER POC      EXPIRATION DATE     AMB POC RSV   Result Value Ref Range    Valid Internal Control, POC yes     RSV RNA, POC negative    AMB POC RAPID INFLUENZA TEST   Result Value Ref Range    Valid Internal Control, POC yes     QuickVue Influenza test Negative                Praveena Echavarria is a 68 y.o. female who is seen for evaluation of   Chief Complaint   Patient presents with    Pharyngitis     Pt stated Saturday and Sunday, 10/19-20/24 throat was sore. Pt stated sore throat is better.  Pt stated still having chest and nasal congestion and ear fullness and body aches and fatigue  Pt denies fever or shortness of breath.       HPI:   Patient states that symptoms began Saturday 10/19. Patient initially experienced sore throat

## 2024-11-14 ENCOUNTER — PREP FOR PROCEDURE (OUTPATIENT)
Dept: GASTROENTEROLOGY | Age: 69
End: 2024-11-14

## 2024-11-14 DIAGNOSIS — Z12.11 ENCOUNTER FOR SCREENING COLONOSCOPY: Primary | ICD-10-CM

## 2024-11-14 DIAGNOSIS — Z12.11 SCREENING FOR COLON CANCER: ICD-10-CM

## 2024-11-14 RX ORDER — SODIUM CHLORIDE 0.9 % (FLUSH) 0.9 %
5-40 SYRINGE (ML) INJECTION PRN
Status: CANCELLED | OUTPATIENT
Start: 2024-11-14

## 2024-11-14 RX ORDER — SODIUM CHLORIDE 9 MG/ML
25 INJECTION, SOLUTION INTRAVENOUS PRN
Status: CANCELLED | OUTPATIENT
Start: 2024-11-14

## 2024-11-14 RX ORDER — SODIUM CHLORIDE 0.9 % (FLUSH) 0.9 %
5-40 SYRINGE (ML) INJECTION EVERY 12 HOURS SCHEDULED
Status: CANCELLED | OUTPATIENT
Start: 2024-11-14

## 2024-11-14 RX ORDER — POLYETHYLENE GLYCOL 3350, SODIUM SULFATE ANHYDROUS, SODIUM BICARBONATE, SODIUM CHLORIDE, POTASSIUM CHLORIDE 236; 22.74; 6.74; 5.86; 2.97 G/4L; G/4L; G/4L; G/4L; G/4L
4 POWDER, FOR SOLUTION ORAL ONCE
Qty: 4000 ML | Refills: 0 | Status: SHIPPED | OUTPATIENT
Start: 2024-11-14 | End: 2024-11-14

## 2024-11-14 RX ORDER — BISACODYL 5 MG/1
20 TABLET, DELAYED RELEASE ORAL ONCE
Qty: 4 TABLET | Refills: 0 | Status: SHIPPED | OUTPATIENT
Start: 2024-11-14 | End: 2024-11-14

## 2024-12-02 ENCOUNTER — TELEPHONE (OUTPATIENT)
Dept: GASTROENTEROLOGY | Age: 69
End: 2024-12-02

## 2024-12-02 NOTE — TELEPHONE ENCOUNTER
Pt called to cancel procedure she has scheduled w/Dr. Atkinson 12/10.  She did not wish to reschedule at this time.

## 2024-12-12 DIAGNOSIS — G25.0 ESSENTIAL TREMOR: ICD-10-CM

## 2024-12-12 RX ORDER — PROPRANOLOL HCL 20 MG
20 TABLET ORAL 3 TIMES DAILY
Qty: 270 TABLET | Refills: 1 | Status: SHIPPED | OUTPATIENT
Start: 2024-12-12

## 2024-12-14 PROBLEM — Z12.11 SCREENING FOR COLON CANCER: Status: RESOLVED | Noted: 2024-11-14 | Resolved: 2024-12-14

## 2024-12-23 ENCOUNTER — TELEPHONE (OUTPATIENT)
Dept: FAMILY MEDICINE CLINIC | Facility: CLINIC | Age: 69
End: 2024-12-23

## 2024-12-23 NOTE — TELEPHONE ENCOUNTER
Pt requesting new referral to [facility] in [place].     She is aware Dr. Das is out of the office this week.

## 2024-12-27 NOTE — TELEPHONE ENCOUNTER
Called pt, left message on voicemail.   Advised I contacted PT and they have all information and referral that is needed at this time.    Current every day smoker

## 2024-12-27 NOTE — TELEPHONE ENCOUNTER
Called and spoke to Shane Dai PT. Malaika stated referral is on file and patient is scheduled for evaluation on 12/31/24.  Malaika stated no other information or notes needed from the provider at this time.   Phone number: [phone]

## 2025-01-16 ENCOUNTER — OFFICE VISIT (OUTPATIENT)
Dept: FAMILY MEDICINE CLINIC | Facility: CLINIC | Age: 70
End: 2025-01-16

## 2025-01-16 VITALS
WEIGHT: 228.8 LBS | HEIGHT: 66 IN | SYSTOLIC BLOOD PRESSURE: 120 MMHG | HEART RATE: 71 BPM | OXYGEN SATURATION: 95 % | DIASTOLIC BLOOD PRESSURE: 72 MMHG | RESPIRATION RATE: 16 BRPM | BODY MASS INDEX: 36.77 KG/M2

## 2025-01-16 DIAGNOSIS — R73.03 PREDIABETES: Primary | ICD-10-CM

## 2025-01-16 DIAGNOSIS — N39.41 URGE INCONTINENCE: ICD-10-CM

## 2025-01-16 DIAGNOSIS — E78.00 ELEVATED LDL CHOLESTEROL LEVEL: ICD-10-CM

## 2025-01-16 DIAGNOSIS — J30.2 OTHER SEASONAL ALLERGIC RHINITIS: ICD-10-CM

## 2025-01-16 DIAGNOSIS — E87.5 HYPERKALEMIA: ICD-10-CM

## 2025-01-16 DIAGNOSIS — J45.909 UNCOMPLICATED ASTHMA, UNSPECIFIED ASTHMA SEVERITY, UNSPECIFIED WHETHER PERSISTENT: ICD-10-CM

## 2025-01-16 LAB
ALBUMIN SERPL-MCNC: 3.8 G/DL (ref 3.2–4.6)
ALBUMIN/GLOB SERPL: 1.5 (ref 1–1.9)
ALP SERPL-CCNC: 100 U/L (ref 35–104)
ALT SERPL-CCNC: 18 U/L (ref 8–45)
ANION GAP SERPL CALC-SCNC: 9 MMOL/L (ref 7–16)
AST SERPL-CCNC: 18 U/L (ref 15–37)
BASOPHILS # BLD: 0.04 K/UL (ref 0–0.2)
BASOPHILS NFR BLD: 0.9 % (ref 0–2)
BILIRUB SERPL-MCNC: 0.2 MG/DL (ref 0–1.2)
BUN SERPL-MCNC: 32 MG/DL (ref 8–23)
CALCIUM SERPL-MCNC: 9.5 MG/DL (ref 8.8–10.2)
CHLORIDE SERPL-SCNC: 105 MMOL/L (ref 98–107)
CHOLEST SERPL-MCNC: 215 MG/DL (ref 0–200)
CO2 SERPL-SCNC: 27 MMOL/L (ref 20–29)
CREAT SERPL-MCNC: 0.84 MG/DL (ref 0.6–1.1)
DIFFERENTIAL METHOD BLD: ABNORMAL
EOSINOPHIL # BLD: 0.24 K/UL (ref 0–0.8)
EOSINOPHIL NFR BLD: 5.7 % (ref 0.5–7.8)
ERYTHROCYTE [DISTWIDTH] IN BLOOD BY AUTOMATED COUNT: 13.9 % (ref 11.9–14.6)
EST. AVERAGE GLUCOSE BLD GHB EST-MCNC: 120 MG/DL
GLOBULIN SER CALC-MCNC: 2.6 G/DL (ref 2.3–3.5)
GLUCOSE SERPL-MCNC: 111 MG/DL (ref 70–99)
HBA1C MFR BLD: 5.8 % (ref 0–5.6)
HCT VFR BLD AUTO: 39.3 % (ref 35.8–46.3)
HDLC SERPL-MCNC: 69 MG/DL (ref 40–60)
HDLC SERPL: 3.1 (ref 0–5)
HGB BLD-MCNC: 11.7 G/DL (ref 11.7–15.4)
IMM GRANULOCYTES # BLD AUTO: 0.01 K/UL (ref 0–0.5)
IMM GRANULOCYTES NFR BLD AUTO: 0.2 % (ref 0–5)
LDLC SERPL CALC-MCNC: 128 MG/DL (ref 0–100)
LYMPHOCYTES # BLD: 1.24 K/UL (ref 0.5–4.6)
LYMPHOCYTES NFR BLD: 29.3 % (ref 13–44)
MCH RBC QN AUTO: 28.7 PG (ref 26.1–32.9)
MCHC RBC AUTO-ENTMCNC: 29.8 G/DL (ref 31.4–35)
MCV RBC AUTO: 96.3 FL (ref 82–102)
MONOCYTES # BLD: 0.42 K/UL (ref 0.1–1.3)
MONOCYTES NFR BLD: 9.9 % (ref 4–12)
NEUTS SEG # BLD: 2.28 K/UL (ref 1.7–8.2)
NEUTS SEG NFR BLD: 54 % (ref 43–78)
NRBC # BLD: 0 K/UL (ref 0–0.2)
PLATELET # BLD AUTO: 296 K/UL (ref 150–450)
PMV BLD AUTO: 10.3 FL (ref 9.4–12.3)
POTASSIUM SERPL-SCNC: 6.1 MMOL/L (ref 3.5–5.1)
PROT SERPL-MCNC: 6.3 G/DL (ref 6.3–8.2)
RBC # BLD AUTO: 4.08 M/UL (ref 4.05–5.2)
SODIUM SERPL-SCNC: 141 MMOL/L (ref 136–145)
TRIGL SERPL-MCNC: 93 MG/DL (ref 0–150)
VLDLC SERPL CALC-MCNC: 19 MG/DL (ref 6–23)
WBC # BLD AUTO: 4.2 K/UL (ref 4.3–11.1)

## 2025-01-16 RX ORDER — FLUTICASONE PROPIONATE 50 MCG
2 SPRAY, SUSPENSION (ML) NASAL DAILY
Qty: 3 EACH | Refills: 3 | Status: SHIPPED | OUTPATIENT
Start: 2025-01-16

## 2025-01-16 RX ORDER — FLUTICASONE PROPIONATE AND SALMETEROL XINAFOATE 45; 21 UG/1; UG/1
2 AEROSOL, METERED RESPIRATORY (INHALATION) 2 TIMES DAILY
Qty: 1 EACH | Refills: 5 | Status: SHIPPED | OUTPATIENT
Start: 2025-01-16

## 2025-01-16 RX ORDER — ALBUTEROL SULFATE 90 UG/1
2 INHALANT RESPIRATORY (INHALATION) 4 TIMES DAILY PRN
Qty: 18 G | Refills: 5 | Status: SHIPPED | OUTPATIENT
Start: 2025-01-16

## 2025-01-16 SDOH — ECONOMIC STABILITY: FOOD INSECURITY: WITHIN THE PAST 12 MONTHS, YOU WORRIED THAT YOUR FOOD WOULD RUN OUT BEFORE YOU GOT MONEY TO BUY MORE.: NEVER TRUE

## 2025-01-16 SDOH — ECONOMIC STABILITY: FOOD INSECURITY: WITHIN THE PAST 12 MONTHS, THE FOOD YOU BOUGHT JUST DIDN'T LAST AND YOU DIDN'T HAVE MONEY TO GET MORE.: NEVER TRUE

## 2025-01-16 ASSESSMENT — PATIENT HEALTH QUESTIONNAIRE - PHQ9
SUM OF ALL RESPONSES TO PHQ9 QUESTIONS 1 & 2: 0
10. IF YOU CHECKED OFF ANY PROBLEMS, HOW DIFFICULT HAVE THESE PROBLEMS MADE IT FOR YOU TO DO YOUR WORK, TAKE CARE OF THINGS AT HOME, OR GET ALONG WITH OTHER PEOPLE: NOT DIFFICULT AT ALL
SUM OF ALL RESPONSES TO PHQ QUESTIONS 1-9: 0
5. POOR APPETITE OR OVEREATING: NOT AT ALL
SUM OF ALL RESPONSES TO PHQ QUESTIONS 1-9: 0
3. TROUBLE FALLING OR STAYING ASLEEP: NOT AT ALL
SUM OF ALL RESPONSES TO PHQ QUESTIONS 1-9: 0
8. MOVING OR SPEAKING SO SLOWLY THAT OTHER PEOPLE COULD HAVE NOTICED. OR THE OPPOSITE, BEING SO FIGETY OR RESTLESS THAT YOU HAVE BEEN MOVING AROUND A LOT MORE THAN USUAL: NOT AT ALL
4. FEELING TIRED OR HAVING LITTLE ENERGY: NOT AT ALL
7. TROUBLE CONCENTRATING ON THINGS, SUCH AS READING THE NEWSPAPER OR WATCHING TELEVISION: NOT AT ALL
6. FEELING BAD ABOUT YOURSELF - OR THAT YOU ARE A FAILURE OR HAVE LET YOURSELF OR YOUR FAMILY DOWN: NOT AT ALL
9. THOUGHTS THAT YOU WOULD BE BETTER OFF DEAD, OR OF HURTING YOURSELF: NOT AT ALL
SUM OF ALL RESPONSES TO PHQ QUESTIONS 1-9: 0
2. FEELING DOWN, DEPRESSED OR HOPELESS: NOT AT ALL
1. LITTLE INTEREST OR PLEASURE IN DOING THINGS: NOT AT ALL

## 2025-01-16 ASSESSMENT — ENCOUNTER SYMPTOMS
NAUSEA: 0
COUGH: 0
ABDOMINAL PAIN: 0
BLOOD IN STOOL: 0
SHORTNESS OF BREATH: 0
VOMITING: 0

## 2025-01-16 NOTE — PROGRESS NOTES
Normal breath sounds. No wheezing or rhonchi.   Abdominal:      General: There is no distension.      Palpations: There is no mass.      Tenderness: There is no abdominal tenderness. There is no guarding.      Hernia: No hernia is present.   Musculoskeletal:      Cervical back: Neck supple.      Right lower leg: No edema.      Left lower leg: No edema.   Lymphadenopathy:      Cervical: No cervical adenopathy.   Skin:     General: Skin is warm and dry.   Neurological:      Mental Status: She is alert.   Psychiatric:         Mood and Affect: Mood normal.         Behavior: Behavior normal.               Connie Das DO    The patient (or guardian, if applicable) and other individuals in attendance with the patient were advised that Artificial Intelligence will be utilized during this visit to record, process the conversation to generate a clinical note, and support improvement of the AI technology. The patient (or guardian, if applicable) and other individuals in attendance at the appointment consented to the use of AI, including the recording.

## 2025-01-17 ENCOUNTER — NURSE ONLY (OUTPATIENT)
Dept: FAMILY MEDICINE CLINIC | Facility: CLINIC | Age: 70
End: 2025-01-17
Payer: MEDICARE

## 2025-01-17 DIAGNOSIS — E87.5 POTASSIUM SERUM INCREASED: Primary | ICD-10-CM

## 2025-01-17 DIAGNOSIS — E87.5 HYPERKALEMIA: ICD-10-CM

## 2025-01-17 LAB — POTASSIUM SERPL-SCNC: 5.3 MMOL/L (ref 3.5–5.1)

## 2025-01-17 PROCEDURE — 93000 ELECTROCARDIOGRAM COMPLETE: CPT | Performed by: FAMILY MEDICINE

## 2025-03-04 ENCOUNTER — TRANSCRIBE ORDERS (OUTPATIENT)
Dept: SCHEDULING | Age: 70
End: 2025-03-04

## 2025-03-04 DIAGNOSIS — Z12.31 VISIT FOR SCREENING MAMMOGRAM: Primary | ICD-10-CM

## 2025-03-26 ENCOUNTER — HOSPITAL ENCOUNTER (OUTPATIENT)
Dept: MAMMOGRAPHY | Age: 70
Discharge: HOME | End: 2025-03-29
Attending: FAMILY MEDICINE
Payer: MEDICARE

## 2025-03-26 VITALS — WEIGHT: 228 LBS | HEIGHT: 66 IN | BODY MASS INDEX: 36.64 KG/M2

## 2025-03-26 DIAGNOSIS — Z12.31 VISIT FOR SCREENING MAMMOGRAM: ICD-10-CM

## 2025-03-26 PROCEDURE — 77063 BREAST TOMOSYNTHESIS BI: CPT

## 2025-03-28 ENCOUNTER — RESULTS FOLLOW-UP (OUTPATIENT)
Dept: FAMILY MEDICINE CLINIC | Facility: CLINIC | Age: 70
End: 2025-03-28

## 2025-04-15 DIAGNOSIS — G25.0 ESSENTIAL TREMOR: ICD-10-CM

## 2025-04-15 RX ORDER — PRIMIDONE 50 MG/1
150 TABLET ORAL 4 TIMES DAILY
Qty: 1080 TABLET | Refills: 1 | Status: SHIPPED | OUTPATIENT
Start: 2025-04-15

## 2025-04-20 NOTE — PROGRESS NOTES
[facility] NEUROLOGY  [address], [address]  [place] [address]  Phone: [phone] Fax [phone]  Joaquim Gary MD      Patient: Praveena Echavarria  Provider: Joaquim Gary MD    CC:   Chief Complaint   Patient presents with    Follow-up    Tremors     Referring Provider:    History of Present Illness:     Praveena Echavarria is a 69 y.o. RH female who presents for follow-up of tremor.     She is accompanied for today's visit.  She was last seen virtually in December 2023.      Patient presents for follow-up and continued management of a tremor of the hands which clinically has been most consistent with a benign essential tremor, present over several years.  No family history of tremor.     Current medications include:  Primidone 150 mg 4 times a day  Propranolol 20 mg twice a day (patient reports taking it 3 times a day)     Previous medication trials include: Topiramate     Patient presents today for follow-up.    Chart review reveals no significant changes since the last visit.    However she reports relatively new symptoms of numbness in the right upper extremity, mostly confined to the hand.  However there have been other moments where it is described as more of hemibody numbness on the right side.  She notes a slight gait disturbance with slightly more difficulty on the right side.    Tremor has been well-controlled on a combination of primidone and propranolol.  Tolerating well with no adverse effects.  She only experiences rare breakthrough tremor with no significant functional limitations.      Review of Systems:   Review of Systems   Constitutional:  Negative for fever.   HENT:  Negative for hearing loss.    Eyes:  Negative for visual disturbance.   Respiratory:  Negative for cough.    Cardiovascular:  Negative for chest pain.   Gastrointestinal:  Negative for abdominal pain.   Genitourinary:  Negative for dysuria.   Musculoskeletal:  Negative for gait problem.   Skin:  Negative for

## 2025-04-21 ENCOUNTER — OFFICE VISIT (OUTPATIENT)
Dept: NEUROLOGY | Age: 70
End: 2025-04-21
Payer: MEDICARE

## 2025-04-21 VITALS
BODY MASS INDEX: 38.09 KG/M2 | HEART RATE: 82 BPM | SYSTOLIC BLOOD PRESSURE: 136 MMHG | WEIGHT: 237 LBS | DIASTOLIC BLOOD PRESSURE: 83 MMHG | HEIGHT: 66 IN

## 2025-04-21 DIAGNOSIS — R20.0 RIGHT UPPER EXTREMITY NUMBNESS: ICD-10-CM

## 2025-04-21 DIAGNOSIS — G25.0 ESSENTIAL TREMOR: Primary | ICD-10-CM

## 2025-04-21 PROCEDURE — 3017F COLORECTAL CA SCREEN DOC REV: CPT | Performed by: PSYCHIATRY & NEUROLOGY

## 2025-04-21 PROCEDURE — 1123F ACP DISCUSS/DSCN MKR DOCD: CPT | Performed by: PSYCHIATRY & NEUROLOGY

## 2025-04-21 PROCEDURE — 99214 OFFICE O/P EST MOD 30 MIN: CPT | Performed by: PSYCHIATRY & NEUROLOGY

## 2025-04-21 PROCEDURE — G8427 DOCREV CUR MEDS BY ELIG CLIN: HCPCS | Performed by: PSYCHIATRY & NEUROLOGY

## 2025-04-21 PROCEDURE — 1036F TOBACCO NON-USER: CPT | Performed by: PSYCHIATRY & NEUROLOGY

## 2025-04-21 PROCEDURE — G8399 PT W/DXA RESULTS DOCUMENT: HCPCS | Performed by: PSYCHIATRY & NEUROLOGY

## 2025-04-21 PROCEDURE — 1090F PRES/ABSN URINE INCON ASSESS: CPT | Performed by: PSYCHIATRY & NEUROLOGY

## 2025-04-21 PROCEDURE — G8417 CALC BMI ABV UP PARAM F/U: HCPCS | Performed by: PSYCHIATRY & NEUROLOGY

## 2025-04-21 PROCEDURE — 1159F MED LIST DOCD IN RCRD: CPT | Performed by: PSYCHIATRY & NEUROLOGY

## 2025-04-21 RX ORDER — PROPRANOLOL HCL 20 MG
20 TABLET ORAL 3 TIMES DAILY
Qty: 270 TABLET | Refills: 3 | Status: SHIPPED | OUTPATIENT
Start: 2025-04-21

## 2025-05-28 ENCOUNTER — COMMUNITY OUTREACH (OUTPATIENT)
Dept: FAMILY MEDICINE CLINIC | Facility: CLINIC | Age: 70
End: 2025-05-28

## 2025-06-23 ENCOUNTER — OFFICE VISIT (OUTPATIENT)
Dept: ORTHOPEDIC SURGERY | Age: 70
End: 2025-06-23
Payer: MEDICARE

## 2025-06-23 VITALS — BODY MASS INDEX: 38.74 KG/M2 | WEIGHT: 240 LBS

## 2025-06-23 DIAGNOSIS — M17.11 PRIMARY OSTEOARTHRITIS OF RIGHT KNEE: ICD-10-CM

## 2025-06-23 DIAGNOSIS — M25.561 RIGHT KNEE PAIN, UNSPECIFIED CHRONICITY: Primary | ICD-10-CM

## 2025-06-23 PROCEDURE — G8427 DOCREV CUR MEDS BY ELIG CLIN: HCPCS | Performed by: PHYSICIAN ASSISTANT

## 2025-06-23 PROCEDURE — 20610 DRAIN/INJ JOINT/BURSA W/O US: CPT | Performed by: PHYSICIAN ASSISTANT

## 2025-06-23 PROCEDURE — 1036F TOBACCO NON-USER: CPT | Performed by: PHYSICIAN ASSISTANT

## 2025-06-23 PROCEDURE — 1123F ACP DISCUSS/DSCN MKR DOCD: CPT | Performed by: PHYSICIAN ASSISTANT

## 2025-06-23 PROCEDURE — G8399 PT W/DXA RESULTS DOCUMENT: HCPCS | Performed by: PHYSICIAN ASSISTANT

## 2025-06-23 PROCEDURE — G8417 CALC BMI ABV UP PARAM F/U: HCPCS | Performed by: PHYSICIAN ASSISTANT

## 2025-06-23 PROCEDURE — 1090F PRES/ABSN URINE INCON ASSESS: CPT | Performed by: PHYSICIAN ASSISTANT

## 2025-06-23 PROCEDURE — 99204 OFFICE O/P NEW MOD 45 MIN: CPT | Performed by: PHYSICIAN ASSISTANT

## 2025-06-23 PROCEDURE — 3017F COLORECTAL CA SCREEN DOC REV: CPT | Performed by: PHYSICIAN ASSISTANT

## 2025-06-23 RX ORDER — METHYLPREDNISOLONE ACETATE 40 MG/ML
80 INJECTION, SUSPENSION INTRA-ARTICULAR; INTRALESIONAL; INTRAMUSCULAR; SOFT TISSUE ONCE
Status: COMPLETED | OUTPATIENT
Start: 2025-06-23 | End: 2025-06-23

## 2025-06-23 RX ADMIN — METHYLPREDNISOLONE ACETATE 80 MG: 40 INJECTION, SUSPENSION INTRA-ARTICULAR; INTRALESIONAL; INTRAMUSCULAR; SOFT TISSUE at 11:45

## 2025-06-23 NOTE — PROGRESS NOTES
Name: Praveena Echavarria  YOB: 1955  Gender: female  MRN: [id number]    CC:   Chief Complaint   Patient presents with    Knee Pain     Rt knee         HPI: Praveena Echavarria is a 69 y.o. female with a  has a past medical history of Anxiety, Asthma, Depression, Elevated LDL cholesterol level, Essential tremor, History of gastric bypass, History of melanoma, Osteoporosis, Prediabetes, RLS (restless legs syndrome), and Spinal stenosis of lumbar region with neurogenic claudication. here for evaluation of right knee pain.  She does have a history of left TKA which was performed in [place] prior to 2020  The pain has been present for several months and is becoming worse. The pain is primarily on the Medial, anterior side.   she describes the pain as a constant ache that is intermittently sharp with activity and and often feels unstable  The pain is worse with rising after sitting, standing, and walking  The pain does not radiate down the leg.  she denies numbness and tingling down the leg.   Treatment so far has been activity modification, Tylenol, ibuprofen, ice, and heat with some temporary relief.      Allergies   Allergen Reactions    Bupropion Other (See Comments)     headache    Moxifloxacin Swelling and Rash    Penicillins Rash         Review of Systems:  As per HPI.  Pertinent positives and negatives are addressed with the patient, particularly those related to musculoskeletal concerns.   Non-orthopaedic concerns were referred back to the primary care physician.    PHYSICAL EXAMINATION:   The patient appears their stated age and they are in no distress.  There were no vitals taken for this visit.      The lower extremities are as described below.  Circulation is normal with palpable pedal pulses bilaterally and no edema.  There is no lymph adenopathy in the popliteal or malleolar region.  The skin is without stasis disease distally bilaterally.  Sensation is intact to light touch

## 2025-07-03 ENCOUNTER — TELEPHONE (OUTPATIENT)
Dept: ORTHOPEDIC SURGERY | Age: 70
End: 2025-07-03

## 2025-07-10 ENCOUNTER — TELEPHONE (OUTPATIENT)
Dept: ORTHOPEDIC SURGERY | Age: 70
End: 2025-07-10

## 2025-07-11 ENCOUNTER — LAB (OUTPATIENT)
Dept: FAMILY MEDICINE CLINIC | Facility: CLINIC | Age: 70
End: 2025-07-11

## 2025-07-11 DIAGNOSIS — R73.03 PREDIABETES: ICD-10-CM

## 2025-07-11 DIAGNOSIS — E78.00 ELEVATED LDL CHOLESTEROL LEVEL: ICD-10-CM

## 2025-07-11 LAB
ALBUMIN SERPL-MCNC: 3.4 G/DL (ref 3.2–4.6)
ALBUMIN/GLOB SERPL: 1.2 (ref 1–1.9)
ALP SERPL-CCNC: 98 U/L (ref 35–104)
ALT SERPL-CCNC: 18 U/L (ref 8–45)
ANION GAP SERPL CALC-SCNC: 8 MMOL/L (ref 7–16)
AST SERPL-CCNC: 15 U/L (ref 15–37)
BASOPHILS # BLD: 0.05 K/UL (ref 0–0.2)
BASOPHILS NFR BLD: 1.1 % (ref 0–2)
BILIRUB SERPL-MCNC: <0.2 MG/DL (ref 0–1.2)
BUN SERPL-MCNC: 35 MG/DL (ref 8–23)
CALCIUM SERPL-MCNC: 9.8 MG/DL (ref 8.8–10.2)
CHLORIDE SERPL-SCNC: 106 MMOL/L (ref 98–107)
CHOLEST SERPL-MCNC: 221 MG/DL (ref 0–200)
CO2 SERPL-SCNC: 26 MMOL/L (ref 20–29)
CREAT SERPL-MCNC: 0.68 MG/DL (ref 0.6–1.1)
DIFFERENTIAL METHOD BLD: ABNORMAL
EOSINOPHIL # BLD: 0.24 K/UL (ref 0–0.8)
EOSINOPHIL NFR BLD: 5.3 % (ref 0.5–7.8)
ERYTHROCYTE [DISTWIDTH] IN BLOOD BY AUTOMATED COUNT: 16 % (ref 11.9–14.6)
EST. AVERAGE GLUCOSE BLD GHB EST-MCNC: 130 MG/DL
GLOBULIN SER CALC-MCNC: 2.8 G/DL (ref 2.3–3.5)
GLUCOSE SERPL-MCNC: 105 MG/DL (ref 70–99)
HBA1C MFR BLD: 6.2 % (ref 0–5.6)
HCT VFR BLD AUTO: 37 % (ref 35.8–46.3)
HDLC SERPL-MCNC: 59 MG/DL (ref 40–60)
HDLC SERPL: 3.7 (ref 0–5)
HGB BLD-MCNC: 10.9 G/DL (ref 11.7–15.4)
IMM GRANULOCYTES # BLD AUTO: 0.01 K/UL (ref 0–0.5)
IMM GRANULOCYTES NFR BLD AUTO: 0.2 % (ref 0–5)
LDLC SERPL CALC-MCNC: 129 MG/DL (ref 0–100)
LYMPHOCYTES # BLD: 1.74 K/UL (ref 0.5–4.6)
LYMPHOCYTES NFR BLD: 38.1 % (ref 13–44)
MCH RBC QN AUTO: 26.1 PG (ref 26.1–32.9)
MCHC RBC AUTO-ENTMCNC: 29.5 G/DL (ref 31.4–35)
MCV RBC AUTO: 88.5 FL (ref 82–102)
MONOCYTES # BLD: 0.35 K/UL (ref 0.1–1.3)
MONOCYTES NFR BLD: 7.7 % (ref 4–12)
NEUTS SEG # BLD: 2.18 K/UL (ref 1.7–8.2)
NEUTS SEG NFR BLD: 47.6 % (ref 43–78)
NRBC # BLD: 0 K/UL (ref 0–0.2)
PLATELET # BLD AUTO: 299 K/UL (ref 150–450)
PMV BLD AUTO: 10.4 FL (ref 9.4–12.3)
POTASSIUM SERPL-SCNC: 5.4 MMOL/L (ref 3.5–5.1)
PROT SERPL-MCNC: 6.2 G/DL (ref 6.3–8.2)
RBC # BLD AUTO: 4.18 M/UL (ref 4.05–5.2)
SODIUM SERPL-SCNC: 140 MMOL/L (ref 136–145)
TRIGL SERPL-MCNC: 164 MG/DL (ref 0–150)
VLDLC SERPL CALC-MCNC: 33 MG/DL (ref 6–23)
WBC # BLD AUTO: 4.6 K/UL (ref 4.3–11.1)

## 2025-07-17 ENCOUNTER — PROCEDURE VISIT (OUTPATIENT)
Dept: NEUROLOGY | Age: 70
End: 2025-07-17
Payer: MEDICARE

## 2025-07-17 DIAGNOSIS — G56.01 SEVERE CARPAL TUNNEL SYNDROME OF RIGHT WRIST: Primary | ICD-10-CM

## 2025-07-17 DIAGNOSIS — R20.0 NUMBNESS OF RIGHT HAND: ICD-10-CM

## 2025-07-17 DIAGNOSIS — Q07.8 MARTIN-GRUBER ANASTOMOSIS (HCC): ICD-10-CM

## 2025-07-17 DIAGNOSIS — G56.01 CARPAL TUNNEL SYNDROME ON RIGHT: ICD-10-CM

## 2025-07-17 PROCEDURE — 95910 NRV CNDJ TEST 7-8 STUDIES: CPT | Performed by: PSYCHIATRY & NEUROLOGY

## 2025-07-17 PROCEDURE — 95885 MUSC TST DONE W/NERV TST LIM: CPT | Performed by: PSYCHIATRY & NEUROLOGY

## 2025-07-17 NOTE — PROGRESS NOTES
EMG/Nerve Conduction Study Procedure Note  [address]    [address]  [place]  [address]   [phone]      Hx:    Exam:     69 y.o.  mw female     EMG::   INDIA.   Prediabetes.    Referring:    Dr. Conine Das                          Neurology: Dr. Joaquim Gary  Technologist ::   Christy Goetz  Hgt:   5 foot 6 inch     BMI: 38.74           Summary     needle EMG selected upper extremity hand muscles with CV studies.                 Controlled environmental factors / EMG lab.  Temperature.   NCV : sensory segments:       Markedly abnormal = ABSENT /no response of the right median SNAP.  Normal right ulnar and right radial SCV.     NCV transcarpal sensory segments:      Markedly abnormal right median transcarpal SCV = ABSENT /no response SNAP.  Normal right transcarpal ulnar SCV.  NCV Motor MCV segments:    Markedly prolonged right median TL of 8.35 ms (UL = 4.15 ms) with attenuated CMAP and superimposed on significant Tanner-Anselmo anastomosis variant.  Normal right ulnar MCV.       F-wave studies:    Markedly abnormal delayed and prolonged right median/ulnar APB F waves.  Normal right ulnar ADM F waves.        NEEDLE EMG:   Tested muscles::   Studied right FDI APB ADM = all these are within normal limits.  No fibrillation positive sharp waves etc.                 INTERPRETATION:    ELECTROPHYSIOLOGIC EVIDENCE OF SEVERE RIGHT MEDIAN ENTRAPMENT SYNDROME AT THE CARPAL SEGMENT OF THE WRIST.  THIS IS SUPERIMPOSED ON A SIGNIFICANT TANNER-ANSELMO ANASTOMOTIC VARIANT.  NORMAL OTHERWISE.  NO AXONAL DENERVATION.             CONCLUSION:       Severe right carpal tunnel syndrome superimposed on right Tanner-Anselmo anastomotic variant.             Procedure Details:       Further clinical correlation is warranted.    Patient is made aware              Please Note::     Data and waveforms * filed under Procedure.    See Procedure Files for data pages.       [ *NOTE:  parts or all of this report are produced using

## 2025-07-18 ENCOUNTER — OFFICE VISIT (OUTPATIENT)
Dept: FAMILY MEDICINE CLINIC | Facility: CLINIC | Age: 70
End: 2025-07-18

## 2025-07-18 ENCOUNTER — TELEPHONE (OUTPATIENT)
Dept: ORTHOPEDIC SURGERY | Age: 70
End: 2025-07-18

## 2025-07-18 VITALS
SYSTOLIC BLOOD PRESSURE: 126 MMHG | HEIGHT: 66 IN | RESPIRATION RATE: 16 BRPM | OXYGEN SATURATION: 95 % | HEART RATE: 75 BPM | BODY MASS INDEX: 38.35 KG/M2 | DIASTOLIC BLOOD PRESSURE: 74 MMHG | WEIGHT: 238.6 LBS

## 2025-07-18 DIAGNOSIS — G56.01 CARPAL TUNNEL SYNDROME OF RIGHT WRIST: ICD-10-CM

## 2025-07-18 DIAGNOSIS — E87.5 HYPERKALEMIA: ICD-10-CM

## 2025-07-18 DIAGNOSIS — J45.40 MODERATE PERSISTENT ASTHMA WITHOUT COMPLICATION: ICD-10-CM

## 2025-07-18 DIAGNOSIS — E78.00 ELEVATED LDL CHOLESTEROL LEVEL: ICD-10-CM

## 2025-07-18 DIAGNOSIS — J30.2 OTHER SEASONAL ALLERGIC RHINITIS: ICD-10-CM

## 2025-07-18 DIAGNOSIS — R73.03 PREDIABETES: ICD-10-CM

## 2025-07-18 DIAGNOSIS — M17.11 ARTHRITIS OF KNEE, RIGHT: ICD-10-CM

## 2025-07-18 DIAGNOSIS — Z00.00 MEDICARE ANNUAL WELLNESS VISIT, SUBSEQUENT: Primary | ICD-10-CM

## 2025-07-18 DIAGNOSIS — D50.9 IRON DEFICIENCY ANEMIA, UNSPECIFIED IRON DEFICIENCY ANEMIA TYPE: ICD-10-CM

## 2025-07-18 RX ORDER — FLUTICASONE PROPIONATE AND SALMETEROL 250; 50 UG/1; UG/1
1 POWDER RESPIRATORY (INHALATION) EVERY 12 HOURS
Qty: 3 EACH | Refills: 1 | Status: SHIPPED | OUTPATIENT
Start: 2025-07-18

## 2025-07-18 RX ORDER — LANOLIN ALCOHOL/MO/W.PET/CERES
1000 CREAM (GRAM) TOPICAL DAILY
COMMUNITY

## 2025-07-18 RX ORDER — FLUTICASONE PROPIONATE 50 MCG
2 SPRAY, SUSPENSION (ML) NASAL DAILY
Qty: 3 EACH | Refills: 3 | Status: SHIPPED | OUTPATIENT
Start: 2025-07-18

## 2025-07-18 ASSESSMENT — ENCOUNTER SYMPTOMS
VOMITING: 0
BLOOD IN STOOL: 0
COUGH: 0
ABDOMINAL PAIN: 0
NAUSEA: 0
SHORTNESS OF BREATH: 0

## 2025-07-18 ASSESSMENT — PATIENT HEALTH QUESTIONNAIRE - PHQ9
4. FEELING TIRED OR HAVING LITTLE ENERGY: NOT AT ALL
5. POOR APPETITE OR OVEREATING: NOT AT ALL
3. TROUBLE FALLING OR STAYING ASLEEP: NOT AT ALL
SUM OF ALL RESPONSES TO PHQ QUESTIONS 1-9: 0
8. MOVING OR SPEAKING SO SLOWLY THAT OTHER PEOPLE COULD HAVE NOTICED. OR THE OPPOSITE, BEING SO FIGETY OR RESTLESS THAT YOU HAVE BEEN MOVING AROUND A LOT MORE THAN USUAL: NOT AT ALL
2. FEELING DOWN, DEPRESSED OR HOPELESS: NOT AT ALL
SUM OF ALL RESPONSES TO PHQ QUESTIONS 1-9: 0
SUM OF ALL RESPONSES TO PHQ QUESTIONS 1-9: 0
6. FEELING BAD ABOUT YOURSELF - OR THAT YOU ARE A FAILURE OR HAVE LET YOURSELF OR YOUR FAMILY DOWN: NOT AT ALL
10. IF YOU CHECKED OFF ANY PROBLEMS, HOW DIFFICULT HAVE THESE PROBLEMS MADE IT FOR YOU TO DO YOUR WORK, TAKE CARE OF THINGS AT HOME, OR GET ALONG WITH OTHER PEOPLE: NOT DIFFICULT AT ALL
1. LITTLE INTEREST OR PLEASURE IN DOING THINGS: NOT AT ALL
9. THOUGHTS THAT YOU WOULD BE BETTER OFF DEAD, OR OF HURTING YOURSELF: NOT AT ALL
7. TROUBLE CONCENTRATING ON THINGS, SUCH AS READING THE NEWSPAPER OR WATCHING TELEVISION: NOT AT ALL
SUM OF ALL RESPONSES TO PHQ QUESTIONS 1-9: 0

## 2025-07-18 ASSESSMENT — LIFESTYLE VARIABLES
HOW OFTEN DO YOU HAVE A DRINK CONTAINING ALCOHOL: NEVER
HOW MANY STANDARD DRINKS CONTAINING ALCOHOL DO YOU HAVE ON A TYPICAL DAY: PATIENT DOES NOT DRINK

## 2025-07-18 NOTE — TELEPHONE ENCOUNTER
----- Message from Jessica MOSQUEDA sent at 7/18/2025 11:28 AM EDT -----  Specialty Message to Provider Dr. Swan    Relationship to Patient: Self     Patient Message: Pt. Want sooner appt. For wrist than scheduled as per the pt. Referred doctor want her to see sooner and not damage her nerves. Please call this pt.  --------------------------------------------------------------------------------------------------------------------------    Call Back Information: OK to leave message on voicemail  Preferred Call Back Number: Phone [phone]

## 2025-07-18 NOTE — PROGRESS NOTES
smoker: No      Systolic Blood Pressure: 126 mmHg      Is BP treated: No      HDL Cholesterol: 59 MG/DL      Total Cholesterol: 221 MG/DL      8. Carpal tunnel syndrome of right wrist.  EMG shows severe carpal tunnel of the right wrist. The patient has been referred to a hand surgeon for further evaluation and management and is awaiting contact from the hand surgeon's office. Potential outcomes and recovery expectations post-surgery have been discussed.    9. Arthritis of right knee.  The patient has an appointment with an orthopedic surgeon in October and has requested an earlier appointment with other available doctors. She is advised to follow up with the orthopedic surgeon for further management. Potential outcomes and recovery expectations post-surgery have been discussed.    Follow-up  The patient will follow up in 5 months.        Labs:  No results found for this visit on 07/18/25.    Lab on 07/11/2025   Component Date Value Ref Range Status    Hemoglobin A1C 07/11/2025 6.2 (H)  0 - 5.6 % Final    Comment: Reference Range  Normal       <5.7%  Prediabetes  5.7-6.4%  Diabetes     >6.4%      Estimated Avg Glucose 07/11/2025 130  mg/dL Final    Cholesterol, Total 07/11/2025 221 (H)  0 - 200 MG/DL Final    Comment: Low: Less than or equal to 200 mg/dL  Borderline High: 201-239 mg/dL  High: Greater than or equal to 240 mg/dL      Triglycerides 07/11/2025 164 (H)  0 - 150 MG/DL Final    Comment: Borderline High: 150-199 mg/dL, High: 200-499 mg/dL  Very High: Greater than or equal to 500 mg/dL      HDL 07/11/2025 59  40 - 60 MG/DL Final    LDL Cholesterol 07/11/2025 129 (H)  0 - 100 MG/DL Final    Comment: Near Optimal: 100-129 mg/dL  Borderline High: 130-159, High: 160-189 mg/dL  Very High: Greater than or equal to 190 mg/dL      VLDL Cholesterol Calculated 07/11/2025 33 (H)  6 - 23 MG/DL Final    Chol/HDL Ratio 07/11/2025 3.7  0.0 - 5.0   Final    Sodium 07/11/2025 140  136 - 145 mmol/L Final    Potassium 
  Multiple Vitamins-Minerals (CENTRUM ADULTS PO) Take 1 tablet by mouth daily Yes ProviderTiffany MD   acetaminophen (TYLENOL) 500 MG tablet Take 2 tablets by mouth as needed Yes ProviderTiffany MD   LORazepam (ATIVAN) 1 MG tablet TAKE 1 TABLET BY MOUTH AT BEDTIME Yes ProviderTiffany MD   lamoTRIgine (LAMICTAL) 150 MG tablet TAKE 1 TABLET BY MOUTH AT BEDTIME Yes ProviderTiffany MD   MAGNESIUM PO Take 200 mg by mouth nightly Yes Automatic Reconciliation, Ar   ZINC PO Take 1 tablet by mouth daily Yes Automatic Reconciliation, Ar       CareTeam (Including outside providers/suppliers regularly involved in providing care):   Patient Care Team:  Connie Das DO as PCP - General  Connie Das DO as PCP - Empaneled Provider  Joaquim Gary MD (Neurology)  Nickolas Frazier MD (Dermatology)  Connie Holland APRN - NP (Clinical Nurse Specialist Psychiatric/Mental Health)     Recommendations for Preventive Services Due: see orders and patient instructions/AVS.  Recommended screening schedule for the next 5-10 years is provided to the patient in written form: see Patient Instructions/AVS.     Reviewed and updated this visit:  Tobacco  Allergies  Meds  Problems  Med Hx  Surg Hx  Fam Hx

## 2025-07-18 NOTE — PATIENT INSTRUCTIONS
information.    Personalized Preventive Plan for Praveena Echavarria - 7/18/2025  Medicare offers a range of preventive health benefits. Some of the tests and screenings are paid in full while other may be subject to a deductible, co-insurance, and/or copay.  Some of these benefits include a comprehensive review of your medical history including lifestyle, illnesses that may run in your family, and various assessments and screenings as appropriate.  After reviewing your medical record and screening and assessments performed today your provider may have ordered immunizations, labs, imaging, and/or referrals for you.  A list of these orders (if applicable) as well as your Preventive Care list are included within your After Visit Summary for your review.

## 2025-07-23 ENCOUNTER — OFFICE VISIT (OUTPATIENT)
Age: 70
End: 2025-07-23
Payer: MEDICARE

## 2025-07-23 VITALS — HEIGHT: 66 IN | WEIGHT: 239.2 LBS | BODY MASS INDEX: 38.44 KG/M2

## 2025-07-23 DIAGNOSIS — M25.561 RIGHT KNEE PAIN, UNSPECIFIED CHRONICITY: Primary | ICD-10-CM

## 2025-07-23 DIAGNOSIS — M17.11 ARTHRITIS OF RIGHT KNEE: ICD-10-CM

## 2025-07-23 DIAGNOSIS — M17.11 PRIMARY OSTEOARTHRITIS OF RIGHT KNEE: ICD-10-CM

## 2025-07-23 PROCEDURE — 1123F ACP DISCUSS/DSCN MKR DOCD: CPT | Performed by: ORTHOPAEDIC SURGERY

## 2025-07-23 PROCEDURE — 1159F MED LIST DOCD IN RCRD: CPT | Performed by: ORTHOPAEDIC SURGERY

## 2025-07-23 PROCEDURE — G8417 CALC BMI ABV UP PARAM F/U: HCPCS | Performed by: ORTHOPAEDIC SURGERY

## 2025-07-23 PROCEDURE — G8399 PT W/DXA RESULTS DOCUMENT: HCPCS | Performed by: ORTHOPAEDIC SURGERY

## 2025-07-23 PROCEDURE — 1090F PRES/ABSN URINE INCON ASSESS: CPT | Performed by: ORTHOPAEDIC SURGERY

## 2025-07-23 PROCEDURE — 1036F TOBACCO NON-USER: CPT | Performed by: ORTHOPAEDIC SURGERY

## 2025-07-23 PROCEDURE — G8427 DOCREV CUR MEDS BY ELIG CLIN: HCPCS | Performed by: ORTHOPAEDIC SURGERY

## 2025-07-23 PROCEDURE — 99214 OFFICE O/P EST MOD 30 MIN: CPT | Performed by: ORTHOPAEDIC SURGERY

## 2025-07-23 PROCEDURE — 3017F COLORECTAL CA SCREEN DOC REV: CPT | Performed by: ORTHOPAEDIC SURGERY

## 2025-07-23 NOTE — PROGRESS NOTES
Patient ID:  Praveena Echavarria  [id number]  69 y.o.  1955    Today: 2025          Chief Complaint:  Right Knee pain    HPI:       Praveena Echavarria is a 69 y.o. female seen for evaluation and treatment of right knee pain. Patient reports a longstanding history of pain involving the knee. The patient complains of knee pain with activities, reports pain as mostly occurring along the joint lines, reports stiffness of the knee with prolonged inactivity, and swelling/pain at the end of the day and after increased physical activity. Generally, symptoms improve with sitting/rest. The pain affects the patient’s activities of daily living and quality of life. Patient reports progressive pain and instability in the knee. The pain has been present for an extended period of time. Pain ranges from approximately 4-8 in a cyclical fashion with periods of acute exacerbation.  She recently had a right knee injection on 2025 by Bri.  She has functional and weightbearing pain about the medial aspect of her knee.  She is a non-smoker nondiabetic on no blood thinners.  She has a history of a left total knee arthroplasty in [place] which is well-functioning    Past Medical History:  Past Medical History:   Diagnosis Date    Anxiety     Asthma     managed with inhalers    Depression     Elevated LDL cholesterol level     Essential tremor     History of gastric bypass     History of melanoma     removed from Left wrist area    Osteoporosis     Prediabetes     RLS (restless legs syndrome)     Spinal stenosis of lumbar region with neurogenic claudication        Past Surgical History:  Past Surgical History:   Procedure Laterality Date     SECTION      X3    COLONOSCOPY      GASTRIC BYPASS SURGERY      LUMBAR FUSION N/A 10/10/2023    L2-S1 laminectomy and L2-S1 fusion with allograft, transforaminal lumbar interbody fusion, and instrumentation. performed by Alfred Hastings MD at [facility] OR

## 2025-07-24 ENCOUNTER — TELEPHONE (OUTPATIENT)
Dept: ORTHOPEDIC SURGERY | Age: 70
End: 2025-07-24

## 2025-08-04 ENCOUNTER — OFFICE VISIT (OUTPATIENT)
Age: 70
End: 2025-08-04
Payer: MEDICARE

## 2025-08-04 DIAGNOSIS — G56.01 RIGHT CARPAL TUNNEL SYNDROME: Primary | ICD-10-CM

## 2025-08-04 PROCEDURE — 1123F ACP DISCUSS/DSCN MKR DOCD: CPT | Performed by: ORTHOPAEDIC SURGERY

## 2025-08-04 PROCEDURE — 1090F PRES/ABSN URINE INCON ASSESS: CPT | Performed by: ORTHOPAEDIC SURGERY

## 2025-08-04 PROCEDURE — 1036F TOBACCO NON-USER: CPT | Performed by: ORTHOPAEDIC SURGERY

## 2025-08-04 PROCEDURE — G8399 PT W/DXA RESULTS DOCUMENT: HCPCS | Performed by: ORTHOPAEDIC SURGERY

## 2025-08-04 PROCEDURE — G8417 CALC BMI ABV UP PARAM F/U: HCPCS | Performed by: ORTHOPAEDIC SURGERY

## 2025-08-04 PROCEDURE — G8428 CUR MEDS NOT DOCUMENT: HCPCS | Performed by: ORTHOPAEDIC SURGERY

## 2025-08-04 PROCEDURE — 3017F COLORECTAL CA SCREEN DOC REV: CPT | Performed by: ORTHOPAEDIC SURGERY

## 2025-08-04 PROCEDURE — 99204 OFFICE O/P NEW MOD 45 MIN: CPT | Performed by: ORTHOPAEDIC SURGERY

## 2025-08-07 ENCOUNTER — TELEPHONE (OUTPATIENT)
Age: 70
End: 2025-08-07

## 2025-08-07 DIAGNOSIS — G56.01 RIGHT CARPAL TUNNEL SYNDROME: Primary | ICD-10-CM

## 2025-08-15 DIAGNOSIS — G56.01 RIGHT CARPAL TUNNEL SYNDROME: Primary | ICD-10-CM

## 2025-08-19 ENCOUNTER — OUTSIDE SERVICES (OUTPATIENT)
Age: 70
End: 2025-08-19
Payer: MEDICARE

## 2025-08-19 DIAGNOSIS — G56.01 RIGHT CARPAL TUNNEL SYNDROME: Primary | ICD-10-CM

## 2025-08-19 PROCEDURE — 76998 US GUIDE INTRAOP: CPT | Performed by: ORTHOPAEDIC SURGERY

## 2025-08-19 PROCEDURE — 64721 CARPAL TUNNEL SURGERY: CPT | Performed by: ORTHOPAEDIC SURGERY

## 2025-08-19 RX ORDER — TRAMADOL HYDROCHLORIDE 50 MG/1
50 TABLET ORAL EVERY 6 HOURS PRN
Qty: 20 TABLET | Refills: 0 | Status: SHIPPED | OUTPATIENT
Start: 2025-08-19 | End: 2025-08-24

## 2025-08-22 DIAGNOSIS — G56.01 RIGHT CARPAL TUNNEL SYNDROME: Primary | ICD-10-CM

## 2025-08-22 RX ORDER — CEPHALEXIN 500 MG/1
500 CAPSULE ORAL 3 TIMES DAILY
Qty: 15 CAPSULE | Refills: 0 | Status: SHIPPED | OUTPATIENT
Start: 2025-08-22 | End: 2025-08-27

## 2025-08-22 RX ORDER — METHYLPREDNISOLONE 4 MG/1
TABLET ORAL
Qty: 1 KIT | Refills: 0 | Status: SHIPPED | OUTPATIENT
Start: 2025-08-22

## 2025-08-25 ENCOUNTER — OFFICE VISIT (OUTPATIENT)
Age: 70
End: 2025-08-25

## 2025-08-25 DIAGNOSIS — G56.01 RIGHT CARPAL TUNNEL SYNDROME: Primary | ICD-10-CM

## 2025-08-25 PROCEDURE — 99024 POSTOP FOLLOW-UP VISIT: CPT | Performed by: ORTHOPAEDIC SURGERY

## 2025-09-03 ENCOUNTER — OFFICE VISIT (OUTPATIENT)
Age: 70
End: 2025-09-03

## 2025-09-03 DIAGNOSIS — G56.01 RIGHT CARPAL TUNNEL SYNDROME: Primary | ICD-10-CM

## 2025-09-03 PROCEDURE — 99024 POSTOP FOLLOW-UP VISIT: CPT | Performed by: ORTHOPAEDIC SURGERY

## (undated) DEVICE — MDS601M2 INT-CS,MD 12- 18,MD,BL GY: Brand: MEDLINE

## (undated) DEVICE — ADHESIVE SKIN CLOSURE WND 8.661X1.5 IN 22 CM LIQUIBAND SECUR

## (undated) DEVICE — AGENT HEMOSTATIC SURGIFLOW MATRIX KIT W/THROMBIN

## (undated) DEVICE — CARDINAL HEALTH FLEXIBLE LIGHT HANDLE COVER: Brand: CARDINAL HEALTH

## (undated) DEVICE — RUBBERBAND FASTENING W1/8XL3IN 2 PER PK

## (undated) DEVICE — SUTURE MCRYL SZ 2-0 L27IN ABSRB UD CP-1 1 L36MM 1/2 CIR REV Y266H

## (undated) DEVICE — TUBING, SUCTION, 1/4" X 10', STRAIGHT: Brand: MEDLINE

## (undated) DEVICE — BIPOLAR SEALER 23-112-1 AQM 6.0: Brand: AQUAMANTYS ®

## (undated) DEVICE — 3M™ STERI-DRAPE™ INSTRUMENT POUCH 1018: Brand: STERI-DRAPE™

## (undated) DEVICE — RESERVOIR,SUCTION,100CC,SILICONE: Brand: MEDLINE

## (undated) DEVICE — POSTERIOR LAMI VANPLT-LUCAS: Brand: MEDLINE INDUSTRIES, INC.

## (undated) DEVICE — STERILE PACKAGE WITH CANNULA: Brand: LITE BIO DELIVERY SYSTEM

## (undated) DEVICE — FORCEPS BPLR L210MM TIP L1.5 WRK L105MM STR BAYNT INSUL DISP

## (undated) DEVICE — PENCIL ES L3M BTTN SWCH HOLSTER W/ BLDE ELECTRD EDGE

## (undated) DEVICE — TRAY,URINE METER,100% SILICONE,16FR10ML: Brand: MEDLINE

## (undated) DEVICE — PLEDGET VASC W3/16XL3/8IN THK1/16IN PTFE SFT

## (undated) DEVICE — SUTURE VCRL SZ 1 L27IN ABSRB UD L36MM CP-1 1/2 CIR REV CUT J268H

## (undated) DEVICE — 3M™ TEGADERM™ TRANSPARENT FILM DRESSING FRAME STYLE, 1626W, 4 IN X 4-3/4 IN (10 CM X 12 CM), 50/CT 4CT/CASE: Brand: 3M™ TEGADERM™

## (undated) DEVICE — CORD ES L12FT BPLR FRCP

## (undated) DEVICE — ELECTRODE BLDE L6.5IN CAUT EXT DISP

## (undated) DEVICE — GOWN,REINF,POLY,ECL,PP SLV,XL: Brand: MEDLINE

## (undated) DEVICE — DRAIN SURG 10FR L1/8IN RND CHN FULL FLUT HEAT POLISHED PERF

## (undated) DEVICE — ABSORBENT, WATERPROOF, BACTERIA PROOF FILM DRESSING: Brand: OPSITE POST OP 20X10CM CTN 20

## (undated) DEVICE — CONTAINER,SPECIMEN,O.R.STRL,4.5OZ: Brand: MEDLINE

## (undated) DEVICE — 3M™ IOBAN™ 2 ANTIMICROBIAL INCISE DRAPE 6650EZ: Brand: IOBAN™ 2

## (undated) DEVICE — Device

## (undated) DEVICE — TAP: Brand: XIA 3 SYSTEM - SERRATO

## (undated) DEVICE — SUTURE MCRYL SZ 4-0 L27IN ABSRB UD L19MM PS-2 1/2 CIR PRIM Y426H

## (undated) DEVICE — DRAPE MICSCP W51XL150IN FOR LEICA M680 WILD OHS

## (undated) DEVICE — SOLUTION IRRIG 1000ML 0.9% SOD CHL USP POUR PLAS BTL

## (undated) DEVICE — DRAPE TWL SURG 16X26IN BLU ORB04] ALLCARE INC]

## (undated) DEVICE — SHEET,DRAPE,53X77,STERILE: Brand: MEDLINE

## (undated) DEVICE — APPLIER LIG CLP M L11IN TI STR RNG HNDL FOR 20 CLP DISP

## (undated) DEVICE — SPONGE: SPECIALTY PEANUT XR 100/CS: Brand: MEDICAL ACTION INDUSTRIES

## (undated) DEVICE — UNIVERSAL DRAPES: Brand: MEDLINE INDUSTRIES, INC.

## (undated) DEVICE — GAUZE,SPONGE,8"X4",12PLY,XRAY,STRL,LF: Brand: MEDLINE

## (undated) DEVICE — KIT ARMOR C DRP COLLAPSIBLE AND SELF EXP TOP CVR FOR FLUOROSCOPIC